# Patient Record
Sex: FEMALE | Race: WHITE | NOT HISPANIC OR LATINO | Employment: OTHER | URBAN - NONMETROPOLITAN AREA
[De-identification: names, ages, dates, MRNs, and addresses within clinical notes are randomized per-mention and may not be internally consistent; named-entity substitution may affect disease eponyms.]

---

## 2018-10-24 ENCOUNTER — APPOINTMENT (OUTPATIENT)
Dept: CT IMAGING | Facility: HOSPITAL | Age: 73
End: 2018-10-24

## 2018-10-24 ENCOUNTER — HOSPITAL ENCOUNTER (OUTPATIENT)
Facility: HOSPITAL | Age: 73
Setting detail: OBSERVATION
Discharge: HOME | End: 2018-10-25
Attending: EMERGENCY MEDICINE | Admitting: FAMILY MEDICINE

## 2018-10-24 ENCOUNTER — APPOINTMENT (OUTPATIENT)
Dept: GENERAL RADIOLOGY | Facility: HOSPITAL | Age: 73
End: 2018-10-24

## 2018-10-24 DIAGNOSIS — R07.9 CHEST PAIN, UNSPECIFIED TYPE: ICD-10-CM

## 2018-10-24 DIAGNOSIS — R06.09 DYSPNEA ON EXERTION: Primary | ICD-10-CM

## 2018-10-24 LAB
ALBUMIN SERPL-MCNC: 4 G/DL (ref 3.5–5)
ALBUMIN/GLOB SERPL: 1.2 G/DL (ref 1.1–2.5)
ALP SERPL-CCNC: 64 U/L (ref 24–120)
ALT SERPL W P-5'-P-CCNC: 32 U/L (ref 0–54)
ANION GAP SERPL CALCULATED.3IONS-SCNC: 12 MMOL/L (ref 4–13)
AST SERPL-CCNC: 30 U/L (ref 7–45)
BASOPHILS # BLD AUTO: 0.06 10*3/MM3 (ref 0–0.2)
BASOPHILS NFR BLD AUTO: 0.7 % (ref 0–2)
BILIRUB SERPL-MCNC: 0.9 MG/DL (ref 0.1–1)
BUN BLD-MCNC: 16 MG/DL (ref 5–21)
BUN/CREAT SERPL: 17.2 (ref 7–25)
CALCIUM SPEC-SCNC: 9.5 MG/DL (ref 8.4–10.4)
CHLORIDE SERPL-SCNC: 103 MMOL/L (ref 98–110)
CO2 SERPL-SCNC: 26 MMOL/L (ref 24–31)
CREAT BLD-MCNC: 0.93 MG/DL (ref 0.5–1.4)
D DIMER PPP FEU-MCNC: 1.78 MG/L (FEU) (ref 0–0.5)
DEPRECATED RDW RBC AUTO: 41.2 FL (ref 40–54)
EOSINOPHIL # BLD AUTO: 0.12 10*3/MM3 (ref 0–0.7)
EOSINOPHIL NFR BLD AUTO: 1.4 % (ref 0–4)
ERYTHROCYTE [DISTWIDTH] IN BLOOD BY AUTOMATED COUNT: 12.7 % (ref 12–15)
GFR SERPL CREATININE-BSD FRML MDRD: 59 ML/MIN/1.73
GLOBULIN UR ELPH-MCNC: 3.3 GM/DL
GLUCOSE BLD-MCNC: 120 MG/DL (ref 70–100)
HCT VFR BLD AUTO: 37.3 % (ref 37–47)
HGB BLD-MCNC: 13.1 G/DL (ref 12–16)
HOLD SPECIMEN: NORMAL
HOLD SPECIMEN: NORMAL
IMM GRANULOCYTES # BLD: 0.04 10*3/MM3 (ref 0–0.03)
IMM GRANULOCYTES NFR BLD: 0.5 % (ref 0–5)
LYMPHOCYTES # BLD AUTO: 2.69 10*3/MM3 (ref 0.72–4.86)
LYMPHOCYTES NFR BLD AUTO: 31.3 % (ref 15–45)
MCH RBC QN AUTO: 31 PG (ref 28–32)
MCHC RBC AUTO-ENTMCNC: 35.1 G/DL (ref 33–36)
MCV RBC AUTO: 88.2 FL (ref 82–98)
MONOCYTES # BLD AUTO: 0.78 10*3/MM3 (ref 0.19–1.3)
MONOCYTES NFR BLD AUTO: 9.1 % (ref 4–12)
NEUTROPHILS # BLD AUTO: 4.91 10*3/MM3 (ref 1.87–8.4)
NEUTROPHILS NFR BLD AUTO: 57 % (ref 39–78)
NRBC BLD MANUAL-RTO: 0 /100 WBC (ref 0–0)
NT-PROBNP SERPL-MCNC: 84.4 PG/ML (ref 0–900)
PLATELET # BLD AUTO: 251 10*3/MM3 (ref 130–400)
PMV BLD AUTO: 10.3 FL (ref 6–12)
POTASSIUM BLD-SCNC: 3.5 MMOL/L (ref 3.5–5.3)
PROT SERPL-MCNC: 7.3 G/DL (ref 6.3–8.7)
RBC # BLD AUTO: 4.23 10*6/MM3 (ref 4.2–5.4)
SODIUM BLD-SCNC: 141 MMOL/L (ref 135–145)
TROPONIN I SERPL-MCNC: <0.012 NG/ML (ref 0–0.03)
WBC NRBC COR # BLD: 8.6 10*3/MM3 (ref 4.8–10.8)
WHOLE BLOOD HOLD SPECIMEN: NORMAL
WHOLE BLOOD HOLD SPECIMEN: NORMAL

## 2018-10-24 PROCEDURE — 0 IOPAMIDOL PER 1 ML: Performed by: EMERGENCY MEDICINE

## 2018-10-24 PROCEDURE — 93010 ELECTROCARDIOGRAM REPORT: CPT | Performed by: INTERNAL MEDICINE

## 2018-10-24 PROCEDURE — 71275 CT ANGIOGRAPHY CHEST: CPT

## 2018-10-24 PROCEDURE — 99284 EMERGENCY DEPT VISIT MOD MDM: CPT

## 2018-10-24 PROCEDURE — 85025 COMPLETE CBC W/AUTO DIFF WBC: CPT

## 2018-10-24 PROCEDURE — G0378 HOSPITAL OBSERVATION PER HR: HCPCS

## 2018-10-24 PROCEDURE — 93005 ELECTROCARDIOGRAM TRACING: CPT | Performed by: EMERGENCY MEDICINE

## 2018-10-24 PROCEDURE — 71045 X-RAY EXAM CHEST 1 VIEW: CPT

## 2018-10-24 PROCEDURE — 85379 FIBRIN DEGRADATION QUANT: CPT | Performed by: EMERGENCY MEDICINE

## 2018-10-24 PROCEDURE — 25010000002 ENOXAPARIN PER 10 MG: Performed by: NURSE PRACTITIONER

## 2018-10-24 PROCEDURE — 83880 ASSAY OF NATRIURETIC PEPTIDE: CPT | Performed by: EMERGENCY MEDICINE

## 2018-10-24 PROCEDURE — 84484 ASSAY OF TROPONIN QUANT: CPT | Performed by: EMERGENCY MEDICINE

## 2018-10-24 PROCEDURE — 96372 THER/PROPH/DIAG INJ SC/IM: CPT

## 2018-10-24 PROCEDURE — 84484 ASSAY OF TROPONIN QUANT: CPT | Performed by: NURSE PRACTITIONER

## 2018-10-24 PROCEDURE — 80053 COMPREHEN METABOLIC PANEL: CPT | Performed by: EMERGENCY MEDICINE

## 2018-10-24 PROCEDURE — 93005 ELECTROCARDIOGRAM TRACING: CPT

## 2018-10-24 RX ORDER — ONDANSETRON 2 MG/ML
4 INJECTION INTRAMUSCULAR; INTRAVENOUS EVERY 6 HOURS PRN
Status: DISCONTINUED | OUTPATIENT
Start: 2018-10-24 | End: 2018-10-25 | Stop reason: HOSPADM

## 2018-10-24 RX ORDER — ONDANSETRON 4 MG/1
4 TABLET, FILM COATED ORAL EVERY 6 HOURS PRN
Status: DISCONTINUED | OUTPATIENT
Start: 2018-10-24 | End: 2018-10-25 | Stop reason: HOSPADM

## 2018-10-24 RX ORDER — ASPIRIN 81 MG/1
81 TABLET ORAL DAILY
Status: DISCONTINUED | OUTPATIENT
Start: 2018-10-25 | End: 2018-10-25 | Stop reason: HOSPADM

## 2018-10-24 RX ORDER — ACETAMINOPHEN 325 MG/1
650 TABLET ORAL EVERY 4 HOURS PRN
Status: DISCONTINUED | OUTPATIENT
Start: 2018-10-24 | End: 2018-10-25 | Stop reason: HOSPADM

## 2018-10-24 RX ORDER — LOSARTAN POTASSIUM AND HYDROCHLOROTHIAZIDE 12.5; 5 MG/1; MG/1
1 TABLET ORAL DAILY
COMMUNITY
End: 2022-09-20

## 2018-10-24 RX ORDER — ASPIRIN 81 MG/1
324 TABLET, CHEWABLE ORAL ONCE
Status: DISCONTINUED | OUTPATIENT
Start: 2018-10-24 | End: 2018-10-24 | Stop reason: SDUPTHER

## 2018-10-24 RX ORDER — ONDANSETRON 4 MG/1
4 TABLET, ORALLY DISINTEGRATING ORAL EVERY 6 HOURS PRN
Status: DISCONTINUED | OUTPATIENT
Start: 2018-10-24 | End: 2018-10-25 | Stop reason: HOSPADM

## 2018-10-24 RX ORDER — SODIUM CHLORIDE 0.9 % (FLUSH) 0.9 %
3-10 SYRINGE (ML) INJECTION AS NEEDED
Status: DISCONTINUED | OUTPATIENT
Start: 2018-10-24 | End: 2018-10-25 | Stop reason: HOSPADM

## 2018-10-24 RX ORDER — SODIUM CHLORIDE 0.9 % (FLUSH) 0.9 %
10 SYRINGE (ML) INJECTION AS NEEDED
Status: DISCONTINUED | OUTPATIENT
Start: 2018-10-24 | End: 2018-10-25 | Stop reason: HOSPADM

## 2018-10-24 RX ORDER — SODIUM CHLORIDE 0.9 % (FLUSH) 0.9 %
3 SYRINGE (ML) INJECTION EVERY 12 HOURS SCHEDULED
Status: DISCONTINUED | OUTPATIENT
Start: 2018-10-24 | End: 2018-10-25 | Stop reason: HOSPADM

## 2018-10-24 RX ORDER — ASPIRIN 81 MG/1
324 TABLET, CHEWABLE ORAL ONCE
Status: COMPLETED | OUTPATIENT
Start: 2018-10-24 | End: 2018-10-24

## 2018-10-24 RX ORDER — LISINOPRIL 5 MG/1
5 TABLET ORAL DAILY
COMMUNITY
End: 2018-10-24

## 2018-10-24 RX ADMIN — Medication: at 22:58

## 2018-10-24 RX ADMIN — ENOXAPARIN SODIUM 40 MG: 40 INJECTION SUBCUTANEOUS at 22:57

## 2018-10-24 RX ADMIN — ASPIRIN 81 MG CHEWABLE TABLET 324 MG: 81 TABLET CHEWABLE at 18:24

## 2018-10-24 RX ADMIN — IOPAMIDOL 100 ML: 755 INJECTION, SOLUTION INTRAVENOUS at 19:52

## 2018-10-24 NOTE — ED PROVIDER NOTES
Subjective   Patient is a 73-year-old female who presents with shortness of breath.  Ongoing 3 weeks according to her.  She also notes worsening dyspnea with exertion and chest tightness.  Substernally.  No radiation.  Worse with any type of activity.  She does have a difficult time walking across the room.  No history of CHF.  Denies leg swelling.  She does note 7 pound weight gain recently.  She also endorses a nonproductive cough.  No fevers.  No history of PE or DVT.  No anticoagulation.  No heart history but does have history of hypertension.            Review of Systems   Constitutional: Negative for chills, diaphoresis, fatigue and fever.   HENT: Negative for sore throat.    Eyes: Negative for visual disturbance.   Respiratory: Positive for chest tightness and shortness of breath.    Cardiovascular: Positive for chest pain. Negative for palpitations and leg swelling.   Gastrointestinal: Negative for abdominal pain.   Genitourinary: Negative for hematuria.   Musculoskeletal: Negative for back pain.   Skin: Negative for rash.   Neurological: Negative for syncope and headaches.       Past Medical History:   Diagnosis Date   • Hypertension        Allergies   Allergen Reactions   • Penicillins Hives       Past Surgical History:   Procedure Laterality Date   • CHOLECYSTECTOMY     • HYSTERECTOMY         History reviewed. No pertinent family history.    Social History     Social History   • Marital status:      Social History Main Topics   • Smoking status: Never Smoker   • Alcohol use No   • Drug use: No     Other Topics Concern   • Not on file       Lab Results (last 24 hours)     Procedure Component Value Units Date/Time    CBC & Differential [[id number]] Collected:  10/24/18 1817    Specimen:  Blood Updated:  10/24/18 1828    Narrative:       The following orders were created for panel order CBC & Differential.  Procedure                               Abnormality         Status                     ---------                                -----------         ------                     CBC Auto Differential[[id number]]        Abnormal            Final result                 Please view results for these tests on the individual orders.    Comprehensive Metabolic Panel [[id number]]  (Abnormal) Collected:  10/24/18 1817    Specimen:  Blood Updated:  10/24/18 1839     Glucose 120 (H) mg/dL      BUN 16 mg/dL      Creatinine 0.93 mg/dL      Sodium 141 mmol/L      Potassium 3.5 mmol/L      Chloride 103 mmol/L      CO2 26.0 mmol/L      Calcium 9.5 mg/dL      Total Protein 7.3 g/dL      Albumin 4.00 g/dL      ALT (SGPT) 32 U/L      AST (SGOT) 30 U/L      Alkaline Phosphatase 64 U/L      Total Bilirubin 0.9 mg/dL      eGFR Non African Amer 59 (L) mL/min/1.73      Globulin 3.3 gm/dL      A/G Ratio 1.2 g/dL      BUN/Creatinine Ratio 17.2     Anion Gap 12.0 mmol/L     Narrative:       The MDRD GFR formula is only valid for adults with stable renal function between ages 18 and 70.    Troponin [[id number]]  (Normal) Collected:  10/24/18 1817    Specimen:  Blood Updated:  10/24/18 1850     Troponin I <0.012 ng/mL     CBC Auto Differential [[id number]]  (Abnormal) Collected:  10/24/18 1817    Specimen:  Blood Updated:  10/24/18 1828     WBC 8.60 10*3/mm3      RBC 4.23 10*6/mm3      Hemoglobin 13.1 g/dL      Hematocrit 37.3 %      MCV 88.2 fL      MCH 31.0 pg      MCHC 35.1 g/dL      RDW 12.7 %      RDW-SD 41.2 fl      MPV 10.3 fL      Platelets 251 10*3/mm3      Neutrophil % 57.0 %      Lymphocyte % 31.3 %      Monocyte % 9.1 %      Eosinophil % 1.4 %      Basophil % 0.7 %      Immature Grans % 0.5 %      Neutrophils, Absolute 4.91 10*3/mm3      Lymphocytes, Absolute 2.69 10*3/mm3      Monocytes, Absolute 0.78 10*3/mm3      Eosinophils, Absolute 0.12 10*3/mm3      Basophils, Absolute 0.06 10*3/mm3      Immature Grans, Absolute 0.04 (H) 10*3/mm3      nRBC 0.0 /100 WBC     D-dimer, Quantitative [[id number]]  (Abnormal) Collected:  10/24/18 4308     Specimen:  Blood Updated:  10/24/18 1921     D-Dimer, Quantitative 1.78 (H) mg/L (FEU)     Narrative:       Reference Range is 0-0.50 mg/L FEU. However, results <0.50 mg/L FEU tends to rule out DVT or PE. Results >0.50 mg/L FEU are not useful in predicting absence or presence of DVT or PE.    BNP [[id number]]  (Normal) Collected:  10/24/18 1817    Specimen:  Blood Updated:  10/24/18 1913     proBNP 84.4 pg/mL     Troponin [[id number]]  (Normal) Collected:  10/24/18 2045    Specimen:  Blood Updated:  10/24/18 2114     Troponin I <0.012 ng/mL           Objective   Physical Exam   Constitutional: She is oriented to person, place, and time. Vital signs are normal. She appears well-nourished.  Non-toxic appearance. She does not have a sickly appearance. She does not appear ill. No distress.   HENT:   Head: Atraumatic.   Mouth/Throat: Oropharynx is clear and moist and mucous membranes are normal.   Eyes: Pupils are equal, round, and reactive to light. EOM are normal.   Neck: Normal range of motion. Neck supple. No JVD present.   Cardiovascular: Normal rate, regular rhythm and normal heart sounds.  Exam reveals no gallop and no friction rub.    No murmur heard.  Pulmonary/Chest: Effort normal and breath sounds normal. No stridor. No tachypnea. No respiratory distress. She has no decreased breath sounds. She has no wheezes. She has no rhonchi. She has no rales.   Abdominal: Soft. There is no tenderness.   Musculoskeletal: She exhibits no edema or tenderness.   Neurological: She is alert and oriented to person, place, and time.   Skin: Skin is warm and dry. She is not diaphoretic.   Psychiatric: She has a normal mood and affect.   Nursing note and vitals reviewed.      ECG 12 Lead    Date/Time: 10/24/2018 7:01 PM  Performed by: FRANCISCO GERARD  Authorized by: FRANCISCO GERARD   Interpreted by physician  Comparison: not compared with previous ECG   Rhythm: sinus rhythm  Rate: normal  QRS axis: left  T depression:  "III  Other: no other findings  Clinical impression: non-specific ECG  Comments: Nonspecific ST waves               CT Angiogram Chest With Contrast   Final Result   1. No evidence of pulmonary embolism or acute findings. Mild bibasilar   atelectasis and probable atelectasis within the dependent aspect of the   left upper lobe along the fissure. No consolidating pneumonia.                   This report was finalized on 10/24/2018 20:10 by Dr. Facundo Turner MD.      XR Chest 1 View   Final Result   Shallow inspiration, no acute cardiopulmonary abnormality.       This report was finalized on 10/24/2018 19:04 by Dr. Facundo Turner MD.          /78 (BP Location: Left arm, Patient Position: Lying)   Pulse 72   Temp 97.5 °F (36.4 °C) (Temporal Artery )   Resp 20   Ht 157.5 cm (62\")   Wt 86.2 kg (190 lb)   SpO2 97%   BMI 34.75 kg/m²     ED Course    ED Course as of Oct 24 2130   Wed Oct 24, 2018   1934 D-dimer, Quant: (!) 1.78 [TH]   1934 This is a 73-year-old female who presents in setting of shortness of breath with exertion.  7 pound weight gain over the past several weeks.  Vital signs are stable.  No hypoxia.  She is well-appearing.  No edema, JVD, crackles.  Her chest x-ray is clear.  BNP is normal.  Her d-dimer is elevated therefore we will obtain a CT angiogram.  Her troponin is negative.  ECG is normal sinus.  [TH]   2026 CT angiogram does show atelectasis.  No pneumonia or edema.  [TH]   2107 Her repeat troponin is negative.    [TH]   2129 Given chest pain and dyspnea on exertion, we will admit observation for serial enzymes and possible echocardiogram tomorrow.  [TH]      ED Course User Index  [TH] Ced Mathew MD       Medications   sodium chloride 0.9 % flush 10 mL (not administered)   aspirin chewable tablet 324 mg (324 mg Oral Given 10/24/18 1824)   iopamidol (ISOVUE-370) 76 % injection 100 mL (100 mL Intravenous Given 10/24/18 1952)            MDM  Number of Diagnoses or Management " Options  Chest pain, unspecified type: new and requires workup  Dyspnea on exertion: new and requires workup     Amount and/or Complexity of Data Reviewed  Clinical lab tests: reviewed and ordered  Tests in the radiology section of CPT®: reviewed and ordered  Tests in the medicine section of CPT®: reviewed  Discuss the patient with other providers: yes    Risk of Complications, Morbidity, and/or Mortality  Presenting problems: moderate  Diagnostic procedures: moderate  Management options: moderate    Patient Progress  Patient progress: stable      Final diagnoses:   Dyspnea on exertion   Chest pain, unspecified type          Ced Mathew MD  10/24/18 6919

## 2018-10-25 ENCOUNTER — APPOINTMENT (OUTPATIENT)
Dept: CARDIOLOGY | Facility: HOSPITAL | Age: 73
End: 2018-10-25

## 2018-10-25 VITALS
HEART RATE: 73 BPM | OXYGEN SATURATION: 95 % | SYSTOLIC BLOOD PRESSURE: 115 MMHG | HEIGHT: 62 IN | DIASTOLIC BLOOD PRESSURE: 55 MMHG | WEIGHT: 198.19 LBS | TEMPERATURE: 96.9 F | RESPIRATION RATE: 16 BRPM | BODY MASS INDEX: 36.47 KG/M2

## 2018-10-25 LAB
ANION GAP SERPL CALCULATED.3IONS-SCNC: 9 MMOL/L (ref 4–13)
ARTICHOKE IGE QN: 104 MG/DL (ref 0–99)
BH CV STRESS BP STAGE 1: NORMAL
BH CV STRESS BP STAGE 2: NORMAL
BH CV STRESS BP STAGE 3: NORMAL
BH CV STRESS DOSE DOBUTAMINE STAGE 1: 10
BH CV STRESS DOSE DOBUTAMINE STAGE 2: 20
BH CV STRESS DOSE DOBUTAMINE STAGE 3: 30
BH CV STRESS DURATION MIN STAGE 1: 3
BH CV STRESS DURATION MIN STAGE 2: 3
BH CV STRESS DURATION MIN STAGE 3: 3
BH CV STRESS DURATION SEC STAGE 1: 0
BH CV STRESS DURATION SEC STAGE 2: 0
BH CV STRESS DURATION SEC STAGE 3: 7
BH CV STRESS HR STAGE 1: 96
BH CV STRESS HR STAGE 2: 113
BH CV STRESS HR STAGE 3: 127
BH CV STRESS PROTOCOL 1: NORMAL
BH CV STRESS RECOVERY BP: NORMAL MMHG
BH CV STRESS RECOVERY HR: 86 BPM
BH CV STRESS STAGE 1: 1
BH CV STRESS STAGE 2: 2
BH CV STRESS STAGE 3: 3
BUN BLD-MCNC: 18 MG/DL (ref 5–21)
BUN/CREAT SERPL: 17.5 (ref 7–25)
CALCIUM SPEC-SCNC: 9 MG/DL (ref 8.4–10.4)
CHLORIDE SERPL-SCNC: 105 MMOL/L (ref 98–110)
CHOLEST SERPL-MCNC: 166 MG/DL (ref 130–200)
CO2 SERPL-SCNC: 28 MMOL/L (ref 24–31)
CREAT BLD-MCNC: 1.03 MG/DL (ref 0.5–1.4)
DEPRECATED RDW RBC AUTO: 41.5 FL (ref 40–54)
ERYTHROCYTE [DISTWIDTH] IN BLOOD BY AUTOMATED COUNT: 12.6 % (ref 12–15)
GFR SERPL CREATININE-BSD FRML MDRD: 53 ML/MIN/1.73
GLUCOSE BLD-MCNC: 100 MG/DL (ref 70–100)
HBA1C MFR BLD: 5.8 %
HCT VFR BLD AUTO: 37 % (ref 37–47)
HDLC SERPL-MCNC: 39 MG/DL
HGB BLD-MCNC: 12.6 G/DL (ref 12–16)
LDLC/HDLC SERPL: 2.86 {RATIO}
MAXIMAL PREDICTED HEART RATE: 147 BPM
MCH RBC QN AUTO: 30.4 PG (ref 28–32)
MCHC RBC AUTO-ENTMCNC: 34.1 G/DL (ref 33–36)
MCV RBC AUTO: 89.4 FL (ref 82–98)
PERCENT MAX PREDICTED HR: 86.39 %
PLATELET # BLD AUTO: 241 10*3/MM3 (ref 130–400)
PMV BLD AUTO: 10.6 FL (ref 6–12)
POTASSIUM BLD-SCNC: 3.7 MMOL/L (ref 3.5–5.3)
RBC # BLD AUTO: 4.14 10*6/MM3 (ref 4.2–5.4)
SODIUM BLD-SCNC: 142 MMOL/L (ref 135–145)
STRESS BASELINE BP: NORMAL MMHG
STRESS BASELINE HR: 67 BPM
STRESS PERCENT HR: 102 %
STRESS POST EXERCISE DUR MIN: 9 MIN
STRESS POST EXERCISE DUR SEC: 7 SEC
STRESS POST PEAK BP: NORMAL MMHG
STRESS POST PEAK HR: 127 BPM
STRESS TARGET HR: 125 BPM
TRIGL SERPL-MCNC: 78 MG/DL (ref 0–149)
TSH SERPL DL<=0.05 MIU/L-ACNC: 5 MIU/ML (ref 0.47–4.68)
WBC NRBC COR # BLD: 6.75 10*3/MM3 (ref 4.8–10.8)

## 2018-10-25 PROCEDURE — 84443 ASSAY THYROID STIM HORMONE: CPT | Performed by: NURSE PRACTITIONER

## 2018-10-25 PROCEDURE — 25010000003 DOBUTAMINE PER 250 MG: Performed by: INTERNAL MEDICINE

## 2018-10-25 PROCEDURE — 85027 COMPLETE CBC AUTOMATED: CPT | Performed by: NURSE PRACTITIONER

## 2018-10-25 PROCEDURE — 94799 UNLISTED PULMONARY SVC/PX: CPT

## 2018-10-25 PROCEDURE — 93018 CV STRESS TEST I&R ONLY: CPT | Performed by: INTERNAL MEDICINE

## 2018-10-25 PROCEDURE — 80048 BASIC METABOLIC PNL TOTAL CA: CPT | Performed by: NURSE PRACTITIONER

## 2018-10-25 PROCEDURE — G0378 HOSPITAL OBSERVATION PER HR: HCPCS

## 2018-10-25 PROCEDURE — 93352 ADMIN ECG CONTRAST AGENT: CPT | Performed by: INTERNAL MEDICINE

## 2018-10-25 PROCEDURE — 80061 LIPID PANEL: CPT | Performed by: NURSE PRACTITIONER

## 2018-10-25 PROCEDURE — 83036 HEMOGLOBIN GLYCOSYLATED A1C: CPT | Performed by: NURSE PRACTITIONER

## 2018-10-25 PROCEDURE — 93350 STRESS TTE ONLY: CPT

## 2018-10-25 PROCEDURE — 25010000002 PERFLUTREN 6.52 MG/ML SUSPENSION: Performed by: INTERNAL MEDICINE

## 2018-10-25 PROCEDURE — 94760 N-INVAS EAR/PLS OXIMETRY 1: CPT

## 2018-10-25 PROCEDURE — 93350 STRESS TTE ONLY: CPT | Performed by: INTERNAL MEDICINE

## 2018-10-25 PROCEDURE — 93017 CV STRESS TEST TRACING ONLY: CPT

## 2018-10-25 RX ORDER — DOBUTAMINE HYDROCHLORIDE 100 MG/100ML
10-50 INJECTION INTRAVENOUS CONTINUOUS
Status: DISCONTINUED | OUTPATIENT
Start: 2018-10-25 | End: 2018-10-25 | Stop reason: HOSPADM

## 2018-10-25 RX ADMIN — Medication 3 ML: at 08:50

## 2018-10-25 RX ADMIN — PERFLUTREN 8.48 MG: 6.52 INJECTION, SUSPENSION INTRAVENOUS at 07:42

## 2018-10-25 RX ADMIN — Medication 10 MCG/KG/MIN: at 07:42

## 2018-10-25 RX ADMIN — ASPIRIN 81 MG: 81 TABLET ORAL at 08:49

## 2018-10-25 RX ADMIN — LOSARTAN POTASSIUM: 50 TABLET ORAL at 08:49

## 2018-10-25 NOTE — PLAN OF CARE
Problem: Patient Care Overview  Goal: Plan of Care Review  Outcome: Ongoing (interventions implemented as appropriate)   10/25/18 0255   Coping/Psychosocial   Plan of Care Reviewed With patient   Plan of Care Review   Progress no change   OTHER   Outcome Summary pt admit from ER. no c/o any kind. denies chest pain. npo since 0000 for echo in the AM. cont to monitor     Goal: Individualization and Mutuality  Outcome: Ongoing (interventions implemented as appropriate)      Problem: Cardiac: ACS (Acute Coronary Syndrome) (Adult)  Goal: Signs and Symptoms of Listed Potential Problems Will be Absent, Minimized or Managed (Cardiac: ACS)  Outcome: Ongoing (interventions implemented as appropriate)

## 2018-10-25 NOTE — DISCHARGE SUMMARY
"    [facility] Medicine Services  DISCHARGE SUMMARY       Date of Admission: 10/24/2018  Date of Discharge:  10/25/2018  Primary Care Physician: Bry Ennis Jr, MD    Presenting Problem/History of Present Illness:  Dyspnea on exertion [R06.09]     Final Discharge Diagnoses:  Dyspnea on exertion  Chest tightness      Consults: none    Procedures Performed: none    Pertinent Test Results:    · Baseline ECG of normal sinus rhythm noted. Non-specific ST-T wave changes noted.  · Chest pain reported with Dobutamine infusion.  · Normal post-stress wall motion.  · Would consider this to be a low risk stress echo with no significant echocardiographic evidence for myocardial ischemia.  ·   Chief Complaint on Day of Discharge:  No pain  No shortness of breath    History of Present Illness on Day of Discharge:   Resolved chest pain/shortness of breath.     Hospital Course:  The patient is a 73 y.o. female who presented to [facility] with shortness of breath and chest tightness  Admitted  Serial enzymes negative.   Stress test obtained.  Low risk for ischemia.       Condition on Discharge:   stable improved    Physical Exam on Discharge:  /55   Pulse 73   Temp 96.9 °F (36.1 °C)   Resp 16   Ht 157.5 cm (62.01\")   Wt 89.9 kg (198 lb 3.1 oz)   SpO2 95%   BMI 36.24 kg/m²   Physical Exam   Constitutional: She is oriented to person, place, and time. She appears well-developed and well-nourished.   Eyes: Pupils are equal, round, and reactive to light. Conjunctivae and EOM are normal.   Neck: Neck supple.   Cardiovascular: Normal rate and regular rhythm.  Exam reveals no gallop and no friction rub.    No murmur heard.  Pulmonary/Chest: Effort normal and breath sounds normal.   Abdominal: Soft. Bowel sounds are normal. There is no hepatosplenomegaly. There is no tenderness.   Musculoskeletal: Normal range of motion. She exhibits no edema.   Neurological: She is alert and " oriented to person, place, and time. No cranial nerve deficit.   Skin: Skin is warm and dry.   Psychiatric: She has a normal mood and affect. Her behavior is normal.   Nursing note and vitals reviewed.        Discharge Disposition:  Home or Self Care    Discharge Medications:     Discharge Medications      Continue These Medications      Instructions Start Date   losartan-hydrochlorothiazide 50-12.5 MG per tablet  Commonly known as:  HYZAAR   1 tablet, Oral, Daily             Discharge Diet:   Diet Instructions     Diet: Regular       Discharge Diet:  Regular          Activity at Discharge:   Activity Instructions     Activity as Tolerated             Discharge Care Plan/Instructions:   Return to ER if problems.     Follow-up Appointments:   5-7 days with PCP    Test Results Pending at Discharge: none    Mey Fletcher DO  10/25/18  2:57 PM    Time:   30 minutes

## 2018-10-25 NOTE — H&P
[facility] Medicine Services  HISTORY AND PHYSICAL    Date of Admission: 10/24/2018  Primary Care Physician: Bry Ennis Jr, MD    Subjective     Chief Complaint: shortness of breathing and chest tightness    History of Present Illness  Mey Gomez is a very pleasant 73-year-old  female with a past medical history of hypertension and spinal stenosis and the lumbar region.  Patient has been receiving injections from her [place] neuro-surgeon and states her back pain is greatly improved and she does not take oral pain medication.  She reports an ongoing worsening dyspnea with exertion with associated chest tightness.  Tightness is located substernally without radiation.  She reports pain is worse with any type of activity.  States she has difficulty walking across a room without becoming shortness of breath.  She has noted a 7 pound weight gain over the past 4 weeks and has attributed to the steroid injections.  She has no history of CHF, no orthopnea, no edema.  She denies abdominal pain or changes in bowel or bladder habits.  Currently at rest she has no shortness of breathing or chest tightness. jere is admitted for further evaluation and treatment.    Review of Systems   A 10 point review of systems was completed, all negative except for those discussed in HPI.    Past Medical History:   Past Medical History:   Diagnosis Date   • Hypertension    • Spinal stenosis of lumbar region        Past Surgical History:   Past Surgical History:   Procedure Laterality Date   • CHOLECYSTECTOMY     • HYSTERECTOMY         Family History: family history includes Cancer in her mother; Heart disease in her mother; No Known Problems in her father.    Social History:  reports that she has never smoked. She does not have any smokeless tobacco history on file. She reports that she does not drink alcohol or use drugs.    Code Status: Full, if unable speak for herself her   "Eduardo will speak for her      Allergies:  Allergies   Allergen Reactions   • Penicillins Hives       Medications:  Losartan-hydrochlorothiazide 50/12.5 mg daily    Objective     /65   Pulse 67   Temp 97.9 °F (36.6 °C) (Temporal Artery )   Resp 18   Ht 157.5 cm (62\")   Wt 86.2 kg (190 lb)   SpO2 97%   BMI 34.75 kg/m²      Physical Exam   Constitutional: She is oriented to person, place, and time. She appears well-developed and well-nourished. No distress.   HENT:   Head: Normocephalic and atraumatic.   Eyes: Pupils are equal, round, and reactive to light. Conjunctivae and EOM are normal. No scleral icterus.   Neck: Normal range of motion. Neck supple. No JVD present. No tracheal deviation present.   Cardiovascular: Normal rate, regular rhythm, normal heart sounds and intact distal pulses.  Exam reveals no gallop.    No murmur heard.  Pulmonary/Chest: Effort normal and breath sounds normal. No respiratory distress. She has no wheezes. She has no rales.   Abdominal: Soft. Bowel sounds are normal. She exhibits no distension. There is no tenderness. There is no guarding.   Musculoskeletal: Normal range of motion. She exhibits no edema.   Neurological: She is alert and oriented to person, place, and time.   Skin: Skin is warm and dry. No rash noted. She is not diaphoretic. No erythema. No pallor.   Psychiatric: She has a normal mood and affect. Her behavior is normal.   Vitals reviewed.      Pertinent Data:   Lab Results (last 72 hours)     Procedure Component Value Units Date/Time    Troponin [[id number]]  (Normal) Collected:  10/24/18 2045    Specimen:  Blood Updated:  10/24/18 2114     Troponin I <0.012 ng/mL     D-dimer, Quantitative [[id number]]  (Abnormal) Collected:  10/24/18 1817    Specimen:  Blood Updated:  10/24/18 1921     D-Dimer, Quantitative 1.78 (H) mg/L (FEU)     Narrative:       Reference Range is 0-0.50 mg/L FEU. However, results <0.50 mg/L FEU tends to rule out DVT or PE. Results >0.50 " mg/L FEU are not useful in predicting absence or presence of DVT or PE.    BNP [[id number]]  (Normal) Collected:  10/24/18 1817    Specimen:  Blood Updated:  10/24/18 1913     proBNP 84.4 pg/mL     Troponin [[id number]]  (Normal) Collected:  10/24/18 1817    Specimen:  Blood Updated:  10/24/18 1850     Troponin I <0.012 ng/mL     Comprehensive Metabolic Panel [[id number]]  (Abnormal) Collected:  10/24/18 1817    Specimen:  Blood Updated:  10/24/18 1839     Glucose 120 (H) mg/dL      BUN 16 mg/dL      Creatinine 0.93 mg/dL      Sodium 141 mmol/L      Potassium 3.5 mmol/L      Chloride 103 mmol/L      CO2 26.0 mmol/L      Calcium 9.5 mg/dL      Total Protein 7.3 g/dL      Albumin 4.00 g/dL      ALT (SGPT) 32 U/L      AST (SGOT) 30 U/L      Alkaline Phosphatase 64 U/L      Total Bilirubin 0.9 mg/dL      eGFR Non African Amer 59 (L) mL/min/1.73      Globulin 3.3 gm/dL      A/G Ratio 1.2 g/dL      BUN/Creatinine Ratio 17.2     Anion Gap 12.0 mmol/L     CBC Auto Differential [[id number]]  (Abnormal) Collected:  10/24/18 1817    Specimen:  Blood Updated:  10/24/18 1828     WBC 8.60 10*3/mm3      RBC 4.23 10*6/mm3      Hemoglobin 13.1 g/dL      Hematocrit 37.3 %      MCV 88.2 fL      MCH 31.0 pg      MCHC 35.1 g/dL      RDW 12.7 %      RDW-SD 41.2 fl      MPV 10.3 fL      Platelets 251 10*3/mm3      Neutrophil % 57.0 %      Lymphocyte % 31.3 %      Monocyte % 9.1 %      Eosinophil % 1.4 %      Basophil % 0.7 %      Immature Grans % 0.5 %      Neutrophils, Absolute 4.91 10*3/mm3      Lymphocytes, Absolute 2.69 10*3/mm3      Monocytes, Absolute 0.78 10*3/mm3      Eosinophils, Absolute 0.12 10*3/mm3      Basophils, Absolute 0.06 10*3/mm3      Immature Grans, Absolute 0.04 (H) 10*3/mm3      nRBC 0.0 /100 WBC         Imaging Results (last 24 hours)     Procedure Component Value Units Date/Time    CT Angiogram Chest With Contrast [[id number]] Collected:  10/24/18 2005     Updated:  10/24/18 2013    Narrative:       EXAMINATION: CT  ANGIOGRAM CHEST W CONTRAST- 10/24/2018 8:05 PM CDT     HISTORY: Chest pain, acute, PE suspected, intermed prob, positive  D-dimer     DOSE: 494 mGycm (Automatic exposure control technique was implemented in  an effort to keep the radiation dose as low as possible without  compromising image quality)     REPORT: Spiral CT of the chest was performed after administration of  intravenous contrast from the thoracic inlet through the upper abdomen  using CTA protocol. Reconstructed coronal and sagittal images were also  reviewed.     Comparison: None.     The contrast bolus is satisfactory, there is mild respiratory motion  artifact. The pulmonary arteries are normal in caliber, no pulmonary  embolism is identified. The thoracic aorta is normal caliber, and no  dissection is seen. Heart size is borderline enlarged. No intrathoracic  lymphadenopathy is identified. The thyroid gland appears within normal.  There are calcified right hilar and subcarinal lymph nodes compatible  with healed granulomatous disease. Review of lung windows shows mild  bibasilar atelectasis and hazy opacities in the dependent portion of the  left upper lobe along the fissure are noted without consolidation. This  probably also represents atelectasis rather than pneumonia. Clinical  correlation is recommended. The airways are patent. There is no  pneumothorax or pleural effusion. The visualized upper and mid shows  evidence of previous cholecystectomy. There are scattered calcified  granulomas within the spleen. Review of bone windows is unremarkable.       Impression:       1. No evidence of pulmonary embolism or acute findings. Mild bibasilar  atelectasis and probable atelectasis within the dependent aspect of the  left upper lobe along the fissure. No consolidating pneumonia.      This report was finalized on 10/24/2018 20:10 by Dr. Facundo Turner MD.    XR Chest 1 View [[id number]] Collected:  10/24/18 1904     Updated:  10/24/18 1907     Narrative:       EXAMINATION: XR CHEST 1 VW- 10/24/2018 7:04 PM CDT     HISTORY: Chest Pain protocol.     REPORT: There are no comparison studies.        The lungs are hypoaerated, no focal infiltrate or pulmonary  consolidation is identified. No pneumothorax or effusion is identified.  Heart size is normal. The osseous structures show no acute findings.       Impression:       Shallow inspiration, no acute cardiopulmonary abnormality.     This report was finalized on 10/24/2018 19:04 by Dr. Facundo Turner MD.          I have personally reviewed and interpreted the radiology studies and ECG obtained at time of admission.     Assessment / Plan     Assessment:   Dyspnea on exertion  Chest pain  Hypertension   Spinal stenosis lumbar region      Plan:   1.  Admit as observation  2.  Stress echocardiogram in a.m.  3.  Serial troponins  4.  DVT prophylaxis with Lovenox and SCDs  5.  Continue home medications  6.  Labs in a.m. CBC, BMP, A1c, lipid panel, TSH    I discussed the patient's findings and my recommendations with: Jarod Singh MD  Time spent: 35 minutes      JAC Dang  10/24/18   10:08 PM     I personally evaluated and examined the patient in conjunction with JAC Pandya and agree with the assessment, treatment plan, and disposition of the patient as recorded by her. My history, exam, and further recommendations are: I have reviewed and agree with the plans. Dixie Singh MD  10/25/18  12:41 AM

## 2018-10-25 NOTE — PROGRESS NOTES
Discharge Planning Assessment  [facility]     Patient Name: Benedict Gomez  MRN: [id number]  Today's Date: 10/25/2018    Admit Date: 10/24/2018          Discharge Needs Assessment     Row Name 10/25/18 1436       Living Environment    Lives With spouse    Current Living Arrangements home/apartment/condo    Primary Care Provided by spouse/significant other    Provides Primary Care For no one    Family Caregiver if Needed spouse    Quality of Family Relationships helpful;involved;supportive    Able to Return to Prior Arrangements yes       Resource/Environmental Concerns    Resource/Environmental Concerns none    Transportation Concerns car, none       Transition Planning    Patient/Family Anticipates Transition to home with family    Transportation Anticipated family or friend will provide       Discharge Needs Assessment    Readmission Within the Last 30 Days no previous admission in last 30 days    Equipment Currently Used at Home bipap/cpap   CPAP [facility]    Discharge Coordination/Progress LIVES WITH SPOUSE; HAS CPAP WITH [facility]; [facility] HAS RX COVERAGE            Discharge Plan    No documentation.       Destination     No service coordination in this encounter.      Durable Medical Equipment     No service coordination in this encounter.      Dialysis/Infusion     No service coordination in this encounter.      Home Medical Care     No service coordination in this encounter.      Social Care     No service coordination in this encounter.                Demographic Summary    No documentation.           Functional Status    No documentation.           Psychosocial    No documentation.           Abuse/Neglect    No documentation.           Legal    No documentation.           Substance Abuse    No documentation.           Patient Forms    No documentation.         Tiffanie Baca RN

## 2021-01-27 ENCOUNTER — OFFICE VISIT (OUTPATIENT)
Dept: FAMILY MEDICINE CLINIC | Facility: CLINIC | Age: 76
End: 2021-01-27

## 2021-01-27 VITALS
HEART RATE: 67 BPM | HEIGHT: 62 IN | TEMPERATURE: 97.4 F | SYSTOLIC BLOOD PRESSURE: 144 MMHG | DIASTOLIC BLOOD PRESSURE: 84 MMHG | OXYGEN SATURATION: 98 % | BODY MASS INDEX: 35.44 KG/M2 | WEIGHT: 192.6 LBS

## 2021-01-27 DIAGNOSIS — I10 ESSENTIAL HYPERTENSION: Primary | ICD-10-CM

## 2021-01-27 DIAGNOSIS — Z11.59 NEED FOR HEPATITIS C SCREENING TEST: ICD-10-CM

## 2021-01-27 DIAGNOSIS — M65.9 SYNOVITIS: ICD-10-CM

## 2021-01-27 DIAGNOSIS — R53.83 FATIGUE, UNSPECIFIED TYPE: ICD-10-CM

## 2021-01-27 PROCEDURE — 99204 OFFICE O/P NEW MOD 45 MIN: CPT | Performed by: FAMILY MEDICINE

## 2021-01-27 RX ORDER — MELOXICAM 7.5 MG/1
1 TABLET ORAL EVERY OTHER DAY
COMMUNITY
Start: 2021-01-22

## 2021-01-27 RX ORDER — PILOCARPINE HYDROCHLORIDE 5 MG/1
5 TABLET, FILM COATED ORAL DAILY
COMMUNITY
Start: 2020-11-19

## 2021-01-27 RX ORDER — MULTIPLE VITAMINS W/ MINERALS TAB 9MG-400MCG
1 TAB ORAL DAILY
COMMUNITY

## 2021-01-27 NOTE — PROGRESS NOTES
Subjective   Benedict Gomez is a 75 y.o. female.     75-year-old female with history of surgery and syndrome and stiffness swelling shortness of breath and hypertension     Hypertension  This is a chronic problem. The current episode started more than 1 year ago. The problem has been waxing and waning since onset. The problem is resistant. Associated symptoms include shortness of breath. Pertinent negatives include no chest pain. Agents associated with hypertension include NSAIDs. Risk factors for coronary artery disease include dyslipidemia, obesity, post-menopausal state and sedentary lifestyle. Past treatments include angiotensin blockers and diuretics. Current antihypertension treatment includes diuretics and angiotensin blockers. The current treatment provides mild improvement. Compliance problems include diet and exercise.      Vitals:    01/27/21 1417   BP: 144/84   Pulse: 67   Temp: 97.4 °F (36.3 °C)   SpO2: 98%       The following portions of the patient's history were reviewed and updated as appropriate: allergies, current medications, past family history, past medical history, past social history, past surgical history and problem list.    Review of Systems   HENT: Positive for mouth sores.    Respiratory: Positive for apnea and shortness of breath.         Uses CPAP regularly for sleep apnea   Cardiovascular: Negative for chest pain and leg swelling.   Gastrointestinal:        Colonoscopy 4- 5 years ago   Genitourinary:        Mammography Fall of 2020   Musculoskeletal: Positive for arthralgias and joint swelling.        History of spinal stenosis sees neurosurgeons in [place]       Objective   Physical Exam  Vitals signs and nursing note reviewed.   Constitutional:       Appearance: She is obese.   HENT:      Mouth/Throat:      Mouth: Mucous membranes are moist.      Comments: Ulcers… lips  Cardiovascular:      Rate and Rhythm: Normal rate and regular rhythm.      Pulses: Normal pulses.       Heart sounds: Normal heart sounds.   Pulmonary:      Effort: Pulmonary effort is normal.      Breath sounds: Normal breath sounds.   Musculoskeletal:      Right lower leg: No edema.      Left lower leg: No edema.      Comments: Slight swelling hands   Neurological:      General: No focal deficit present.      Mental Status: She is alert and oriented to person, place, and time.         Patient's Body mass index is 35.23 kg/m². BMI is above normal parameters. Recommendations include: none (medical contraindication).      Assessment/Plan   Patient Active Problem List   Diagnosis   • Dyspnea on exertion     Diagnoses and all orders for this visit:    1. Essential hypertension (Primary)  -     Comprehensive Metabolic Panel    2. Fatigue, unspecified type  -     CBC & Differential  -     TSH  -     T4, free    3. Synovitis  -     Comprehensive Metabolic Panel  -     C-reactive Protein  -     Sedimentation Rate  -     Rheumatoid Arthritis Expanded Panel  -     ANA PAULA by IFA, Reflex 9-biomarkers profile    4. Need for hepatitis C screening test  -     Hepatitis C Antibody       Return if symptoms worsen or fail to improve, for Next scheduled follow up.    Plan above substitute Tylenol for Mobic knee aphthous ulcers and blood pressure issues-check lab advise she will need further cardiovascular work-up October 2018 was not sufficient         Electronically signed by Nikita Sahu MD 01/27/2021

## 2021-01-29 LAB
ALBUMIN SERPL-MCNC: 4.2 G/DL (ref 3.7–4.7)
ALBUMIN/GLOB SERPL: 1.6 {RATIO} (ref 1.2–2.2)
ALP SERPL-CCNC: 76 IU/L (ref 39–117)
ALT SERPL-CCNC: 19 IU/L (ref 0–32)
ANA TITR SER IF: NEGATIVE {TITER}
AST SERPL-CCNC: 18 IU/L (ref 0–40)
BASOPHILS # BLD AUTO: 0.1 X10E3/UL (ref 0–0.2)
BASOPHILS NFR BLD AUTO: 1 %
BILIRUB SERPL-MCNC: 0.4 MG/DL (ref 0–1.2)
BUN SERPL-MCNC: 21 MG/DL (ref 8–27)
BUN/CREAT SERPL: 17 (ref 12–28)
CALCIUM SERPL-MCNC: 9.3 MG/DL (ref 8.7–10.3)
CCP IGA+IGG SERPL IA-ACNC: 7 UNITS (ref 0–19)
CHLORIDE SERPL-SCNC: 106 MMOL/L (ref 96–106)
CO2 SERPL-SCNC: 27 MMOL/L (ref 20–29)
CREAT SERPL-MCNC: 1.27 MG/DL (ref 0.57–1)
CRP SERPL-MCNC: 1 MG/L (ref 0–10)
EOSINOPHIL # BLD AUTO: 0.1 X10E3/UL (ref 0–0.4)
EOSINOPHIL NFR BLD AUTO: 1 %
ERYTHROCYTE [DISTWIDTH] IN BLOOD BY AUTOMATED COUNT: 11.9 % (ref 11.7–15.4)
ERYTHROCYTE [SEDIMENTATION RATE] IN BLOOD BY WESTERGREN METHOD: NORMAL MM/HR
GLOBULIN SER CALC-MCNC: 2.7 G/DL (ref 1.5–4.5)
GLUCOSE SERPL-MCNC: 104 MG/DL (ref 65–99)
HCT VFR BLD AUTO: 39 % (ref 34–46.6)
HCV AB S/CO SERPL IA: <0.1 S/CO RATIO (ref 0–0.9)
HGB BLD-MCNC: 13.6 G/DL (ref 11.1–15.9)
IMM GRANULOCYTES # BLD AUTO: 0 X10E3/UL (ref 0–0.1)
IMM GRANULOCYTES NFR BLD AUTO: 0 %
LABORATORY COMMENT REPORT: NORMAL
LYMPHOCYTES # BLD AUTO: 2.4 X10E3/UL (ref 0.7–3.1)
LYMPHOCYTES NFR BLD AUTO: 29 %
MCH RBC QN AUTO: 31 PG (ref 26.6–33)
MCHC RBC AUTO-ENTMCNC: 34.9 G/DL (ref 31.5–35.7)
MCV RBC AUTO: 89 FL (ref 79–97)
MONOCYTES # BLD AUTO: 0.7 X10E3/UL (ref 0.1–0.9)
MONOCYTES NFR BLD AUTO: 8 %
NEUTROPHILS # BLD AUTO: 5.1 X10E3/UL (ref 1.4–7)
NEUTROPHILS NFR BLD AUTO: 61 %
PLATELET # BLD AUTO: 239 X10E3/UL (ref 150–450)
POTASSIUM SERPL-SCNC: 4.4 MMOL/L (ref 3.5–5.2)
PROT SERPL-MCNC: 6.9 G/DL (ref 6–8.5)
RBC # BLD AUTO: 4.39 X10E6/UL (ref 3.77–5.28)
RHEUMATOID FACT SERPL-ACNC: <10 IU/ML (ref 0–13.9)
SODIUM SERPL-SCNC: 146 MMOL/L (ref 134–144)
SPECIMEN STATUS: NORMAL
T4 FREE SERPL-MCNC: 1.43 NG/DL (ref 0.82–1.77)
TSH SERPL DL<=0.005 MIU/L-ACNC: 0.99 UIU/ML (ref 0.45–4.5)
WBC # BLD AUTO: 8.4 X10E3/UL (ref 3.4–10.8)

## 2021-02-09 ENCOUNTER — IMMUNIZATION (OUTPATIENT)
Dept: VACCINE CLINIC | Facility: HOSPITAL | Age: 76
End: 2021-02-09

## 2021-02-09 PROCEDURE — 0001A: CPT | Performed by: THORACIC SURGERY (CARDIOTHORACIC VASCULAR SURGERY)

## 2021-02-09 PROCEDURE — 91300 HC SARSCOV02 VAC 30MCG/0.3ML IM: CPT | Performed by: THORACIC SURGERY (CARDIOTHORACIC VASCULAR SURGERY)

## 2021-03-02 ENCOUNTER — IMMUNIZATION (OUTPATIENT)
Dept: VACCINE CLINIC | Facility: HOSPITAL | Age: 76
End: 2021-03-02

## 2021-03-02 PROCEDURE — 91300 HC SARSCOV02 VAC 30MCG/0.3ML IM: CPT | Performed by: NURSE PRACTITIONER

## 2021-03-02 PROCEDURE — 0002A: CPT | Performed by: NURSE PRACTITIONER

## 2021-09-07 ENCOUNTER — OFFICE VISIT (OUTPATIENT)
Dept: FAMILY MEDICINE CLINIC | Facility: CLINIC | Age: 76
End: 2021-09-07

## 2021-09-07 VITALS
HEART RATE: 69 BPM | TEMPERATURE: 97.5 F | BODY MASS INDEX: 34.23 KG/M2 | RESPIRATION RATE: 16 BRPM | WEIGHT: 186 LBS | SYSTOLIC BLOOD PRESSURE: 146 MMHG | DIASTOLIC BLOOD PRESSURE: 78 MMHG | HEIGHT: 62 IN | OXYGEN SATURATION: 99 %

## 2021-09-07 DIAGNOSIS — M25.559 HIP PAIN: ICD-10-CM

## 2021-09-07 DIAGNOSIS — R52 PAIN: Primary | ICD-10-CM

## 2021-09-07 PROCEDURE — 99213 OFFICE O/P EST LOW 20 MIN: CPT | Performed by: FAMILY MEDICINE

## 2021-09-07 NOTE — PROGRESS NOTES
Subjective   Benedict Gomez is a 76 y.o. female.     76-year-old female with increasing pain right hip      The following portions of the patient's history were reviewed and updated as appropriate: allergies, current medications, past family history, past medical history, past social history, past surgical history and problem list.    Review of Systems   Cardiovascular: Negative for chest pain and leg swelling.   Musculoskeletal: Positive for arthralgias and back pain.        Has history of spinal stenosis       Objective   Physical Exam  Vitals and nursing note reviewed.   Constitutional:       Appearance: She is obese.   Cardiovascular:      Rate and Rhythm: Normal rate and regular rhythm.      Pulses: Normal pulses.      Heart sounds: Normal heart sounds.   Musculoskeletal:      Right lower leg: No edema.      Left lower leg: No edema.   Neurological:      General: No focal deficit present.      Mental Status: She is alert and oriented to person, place, and time.      Comments: Patellar reflex decreased on right   Psychiatric:         Mood and Affect: Mood normal.         Behavior: Behavior normal.         Thought Content: Thought content normal.         Judgment: Judgment normal.         Assessment/Plan   Diagnoses and all orders for this visit:    1. Pain (Primary)    2. Hip pain       Plan x-ray pelvis and hips-history of spinal stenosis

## 2021-09-08 ENCOUNTER — OFFICE VISIT (OUTPATIENT)
Dept: FAMILY MEDICINE CLINIC | Facility: CLINIC | Age: 76
End: 2021-09-08

## 2021-09-08 VITALS
HEART RATE: 65 BPM | HEIGHT: 62 IN | TEMPERATURE: 96.8 F | WEIGHT: 186.8 LBS | DIASTOLIC BLOOD PRESSURE: 74 MMHG | SYSTOLIC BLOOD PRESSURE: 128 MMHG | RESPIRATION RATE: 18 BRPM | BODY MASS INDEX: 34.37 KG/M2 | OXYGEN SATURATION: 97 %

## 2021-09-08 DIAGNOSIS — E78.2 MIXED HYPERLIPIDEMIA: ICD-10-CM

## 2021-09-08 DIAGNOSIS — Z78.0 POST-MENOPAUSAL: ICD-10-CM

## 2021-09-08 DIAGNOSIS — I10 ESSENTIAL HYPERTENSION: Primary | ICD-10-CM

## 2021-09-08 DIAGNOSIS — R41.3 MEMORY LOSS: ICD-10-CM

## 2021-09-08 DIAGNOSIS — R53.83 FATIGUE, UNSPECIFIED TYPE: ICD-10-CM

## 2021-09-08 DIAGNOSIS — Z12.11 SCREENING FOR COLORECTAL CANCER: ICD-10-CM

## 2021-09-08 DIAGNOSIS — Z00.00 MEDICARE ANNUAL WELLNESS VISIT, SUBSEQUENT: ICD-10-CM

## 2021-09-08 DIAGNOSIS — Z12.12 SCREENING FOR COLORECTAL CANCER: ICD-10-CM

## 2021-09-08 LAB
BILIRUB BLD-MCNC: NEGATIVE MG/DL
CLARITY, POC: CLEAR
COLOR UR: YELLOW
GLUCOSE UR STRIP-MCNC: NEGATIVE MG/DL
KETONES UR QL: NEGATIVE
LEUKOCYTE EST, POC: ABNORMAL
NITRITE UR-MCNC: NEGATIVE MG/ML
PH UR: 5.5 [PH] (ref 5–8)
PROT UR STRIP-MCNC: NEGATIVE MG/DL
RBC # UR STRIP: ABNORMAL /UL
SP GR UR: 1.03 (ref 1–1.03)
UROBILINOGEN UR QL: NORMAL

## 2021-09-08 PROCEDURE — 81003 URINALYSIS AUTO W/O SCOPE: CPT | Performed by: FAMILY MEDICINE

## 2021-09-08 PROCEDURE — G0439 PPPS, SUBSEQ VISIT: HCPCS | Performed by: FAMILY MEDICINE

## 2021-09-08 NOTE — PROGRESS NOTES
The ABCs of the Annual Wellness Visit  Subsequent Medicare Wellness Visit    Chief Complaint   Patient presents with   • Medicare Wellness-subsequent     Fasting      Subjective    History of Present Illness:  Benedict Gomez is a 76 y.o. female who presents for a Subsequent Medicare Wellness Visit.    The following portions of the patient's history were reviewed and   updated as appropriate: allergies, current medications, past family history, past medical history, past social history, past surgical history and problem list.     Compared to one year ago, the patient feels her physical   health is the same.    Compared to one year ago, the patient feels her mental   health is the same.    Recent Hospitalizations:  She was not admitted to the hospital during the last year.       Current Medical Providers:  Patient Care Team:  Nikita Sahu MD as PCP - General (Family Medicine)    Outpatient Medications Prior to Visit   Medication Sig Dispense Refill   • Cholecalciferol (Vitamin D3) 25 MCG (1000 UT) capsule Take 1 each by mouth Daily.     • losartan-hydrochlorothiazide (HYZAAR) 50-12.5 MG per tablet Take 1 tablet by mouth Daily.     • meloxicam (MOBIC) 7.5 MG tablet Take 1 tablet by mouth Every Other Day.     • multivitamin with minerals (ONE-A-DAY WOMENS 50+ ADVANTAGE PO) Take 1 tablet by mouth Daily.     • pilocarpine (SALAGEN) 5 MG tablet Take 5 mg by mouth Daily.     • Probiotic Product ([place] PO) Take 1 tablet by mouth Daily.       No facility-administered medications prior to visit.       No opioid medication identified on active medication list. I have reviewed chart for other potential  high risk medication/s and harmful drug interactions in the elderly.          Aspirin is not on active medication list.  Aspirin use is not indicated based on review of current medical condition/s. Risk of harm outweighs potential benefits.  .    Patient Active Problem List   Diagnosis   •  "Dyspnea on exertion     Advance Care Planning   Advance Directive is not on file.  ACP discussion was declined by the patient. Patient does not have an advance directive, declines further assistance.    Review of Systems   Respiratory: Positive for apnea. Negative for shortness of breath.         Has TATA-uses CPAP with compliance and cannot leave home without it-continue lifetime because of comorbidities   Cardiovascular: Negative for chest pain and leg swelling.   Gastrointestinal:        Colonoscopy approximately 4 years ago   Genitourinary: Negative for difficulty urinating.   Musculoskeletal: Positive for arthralgias and back pain.        Hip pain   All other systems reviewed and are negative.       Objective       Vitals:    09/08/21 0805   BP: 128/74   BP Location: Left arm   Patient Position: Sitting   Cuff Size: Large Adult   Pulse: 65   Resp: 18   Temp: 96.8 °F (36 °C)   TempSrc: Temporal   SpO2: 97%   Weight: 84.7 kg (186 lb 12.8 oz)   Height: 157.5 cm (62\")   PainSc:   3     BMI Readings from Last 1 Encounters:   09/08/21 34.17 kg/m²   BMI is above normal parameters. Recommendations include: educational material and exercise counseling    Does the patient have evidence of cognitive impairment? No    Physical Exam  Vitals and nursing note reviewed.   Constitutional:       Appearance: She is obese.   HENT:      Right Ear: Tympanic membrane and ear canal normal.      Left Ear: Tympanic membrane and ear canal normal.   Eyes:      Extraocular Movements: Extraocular movements intact.      Pupils: Pupils are equal, round, and reactive to light.   Neck:      Vascular: No carotid bruit.   Cardiovascular:      Rate and Rhythm: Normal rate and regular rhythm.      Pulses: Normal pulses.      Heart sounds: Normal heart sounds.   Pulmonary:      Effort: Pulmonary effort is normal.      Breath sounds: Normal breath sounds.      Comments: Breast no masses noted  Abdominal:      General: Abdomen is flat. There is no " distension.      Palpations: Abdomen is soft. There is no mass.   Genitourinary:     Comments: Surgical past  Musculoskeletal:      Right lower leg: No edema.      Left lower leg: No edema.   Lymphadenopathy:      Cervical: No cervical adenopathy.   Skin:     General: Skin is warm and dry.      Capillary Refill: Capillary refill takes less than 2 seconds.   Neurological:      General: No focal deficit present.      Mental Status: She is alert and oriented to person, place, and time.   Psychiatric:         Mood and Affect: Mood normal.         Behavior: Behavior normal.         Thought Content: Thought content normal.         Judgment: Judgment normal.                 HEALTH RISK ASSESSMENT    Smoking Status:  Social History     Tobacco Use   Smoking Status Never Smoker   Smokeless Tobacco Never Used     Alcohol Consumption:  Social History     Substance and Sexual Activity   Alcohol Use No     Fall Risk Screen:    STEADI Fall Risk Assessment was completed, and patient is at LOW risk for falls.Assessment completed on:9/8/2021    Depression Screening:  PHQ-2/PHQ-9 Depression Screening 9/8/2021   Little interest or pleasure in doing things 0   Feeling down, depressed, or hopeless 0   Total Score 0       Health Habits and Functional and Cognitive Screening:  Functional & Cognitive Status 9/8/2021   Do you have difficulty preparing food and eating? No   Do you have difficulty bathing yourself, getting dressed or grooming yourself? No   Do you have difficulty using the toilet? No   Do you have difficulty moving around from place to place? No   Do you have trouble with steps or getting out of a bed or a chair? Yes   Current Diet Well Balanced Diet   Dental Exam Up to date   Eye Exam Up to date   Exercise (times per week) 7 times per week   Current Exercises Include Walking   Do you need help using the phone?  No   Are you deaf or do you have serious difficulty hearing?  No   Do you need help with transportation? Yes   Do  you need help shopping? No   Do you need help preparing meals?  No   Do you need help with housework?  No   Do you need help with laundry? No   Do you need help taking your medications? No   Do you need help managing money? No   Do you ever drive or ride in a car without wearing a seat belt? No   Have you felt unusual stress, anger or loneliness in the last month? No   Who do you live with? Spouse   If you need help, do you have trouble finding someone available to you? No   Have you been bothered in the last four weeks by sexual problems? No   Do you have difficulty concentrating, remembering or making decisions? No       Age-appropriate Screening Schedule:  Refer to the list below for future screening recommendations based on patient's age, sex and/or medical conditions. Orders for these recommended tests are listed in the plan section. The patient has been provided with a written plan.    Health Maintenance   Topic Date Due   • DXA SCAN  Never done   • LIPID PANEL  09/08/2021   • ZOSTER VACCINE (2 of 3) 09/15/2022 (Originally 2/1/2018)   • INFLUENZA VACCINE  10/01/2021   • TDAP/TD VACCINES (2 - Td or Tdap) 12/14/2027              Assessment/Plan     CMS Preventative Services Quick Reference  Risk Factors Identified During Encounter  Fall Risk-High or Moderate  The above risks/problems have been discussed with the patient.  Follow up actions/plans if indicated are seen below in the Assessment/Plan Section.  Pertinent information has been shared with the patient in the After Visit Summary.    Diagnoses and all orders for this visit:    1. Essential hypertension (Primary)  -     POC Urinalysis Dipstick, Multipro    2. Fatigue, unspecified type  -     TSH  -     T4, free  -     CBC & Differential    3. Mixed hyperlipidemia  -     Comprehensive Metabolic Panel  -     Lipid Panel    4. Screening for colorectal cancer  -     Cologuard - Stool, Per Rectum; Future    5. Memory loss  -     Vitamin B12  -     Folate    6.  Post-menopausal  -     DEXA Bone Density Axial; Future    7. Medicare annual wellness visit, subsequent        Follow Up:   Return in about 6 months (around 3/8/2022).     An After Visit Summary and PPPS were given to the patient.    I spent 25 minutes caring for Benedict on this date of service. This time includes time spent by me in the following activities:preparing for the visit, reviewing tests, obtaining and/or reviewing a separately obtained history, performing a medically appropriate examination and/or evaluation , counseling and educating the patient/family/caregiver, ordering medications, tests, or procedures and documenting information in the medical record       Plan-above

## 2021-09-08 NOTE — PATIENT INSTRUCTIONS
Medicare Wellness  Personal Prevention Plan of Service     Date of Office Visit:    Encounter Provider:  Nikita Sahu MD  Place of Service:  [facility]  Patient Name: Benedict Gomez  :  1945    As part of the Medicare Wellness portion of your visit today, we are providing you with this personalized preventive plan of services (PPPS). This plan is based upon recommendations of the [place] Preventive Services Task Force (USPSTF) and the Advisory Committee on Immunization Practices (ACIP).    This lists the preventive care services that should be considered, and provides dates of when you are due. Items listed as completed are up-to-date and do not require any further intervention.    Health Maintenance   Topic Date Due   • DXA SCAN  Never done   • COLORECTAL CANCER SCREENING  Never done   • LIPID PANEL  2021   • ZOSTER VACCINE (2 of 3) 09/15/2022 (Originally 2018)   • INFLUENZA VACCINE  10/01/2021   • ANNUAL WELLNESS VISIT  2022   • TDAP/TD VACCINES (2 - Td or Tdap) 2027   • HEPATITIS C SCREENING  Completed   • COVID-19 Vaccine  Completed   • Pneumococcal Vaccine 65+  Completed       No orders of the defined types were placed in this encounter.      No follow-ups on file.        Fall Prevention in the Home, Adult  Falls can cause injuries and can affect people from all age groups. There are many simple things that you can do to make your home safe and to help prevent falls. Ask for help when making these changes, if needed.  What actions can I take to prevent falls?  General instructions  · Use good lighting in all rooms. Replace any light bulbs that burn out.  · Turn on lights if it is dark. Use night-lights.  · Place frequently used items in easy-to-reach places. Lower the shelves around your home if necessary.  · Set up furniture so that there are clear paths around it. Avoid moving your furniture around.  · Remove throw rugs  and other tripping hazards from the floor.  · Avoid walking on wet floors.  · Fix any uneven floor surfaces.  · Add color or contrast paint or tape to grab bars and handrails in your home. Place contrasting color strips on the first and last steps of stairways.  · When you use a stepladder, make sure that it is completely opened and that the sides are firmly locked. Have someone hold the ladder while you are using it. Do not climb a closed stepladder.  · Be aware of any and all pets.  What can I do in the bathroom?         · Keep the floor dry. Immediately clean up any water that spills onto the floor.  · Remove soap buildup in the tub or shower on a regular basis.  · Use non-skid mats or decals on the floor of the tub or shower.  · Attach bath mats securely with double-sided, non-slip rug tape.  · If you need to sit down while you are in the shower, use a plastic, non-slip stool.  · Install grab bars by the toilet and in the tub and shower. Do not use towel bars as grab bars.  What can I do in the bedroom?  · Make sure that a bedside light is easy to reach.  · Do not use oversized bedding that drapes onto the floor.  · Have a firm chair that has side arms to use for getting dressed.  What can I do in the kitchen?  · Clean up any spills right away.  · If you need to reach for something above you, use a sturdy step stool that has a grab bar.  · Keep electrical cables out of the way.  · Do not use floor polish or wax that makes floors slippery. If you must use wax, make sure that it is non-skid floor wax.  What can I do in the stairways?  · Do not leave any items on the stairs.  · Make sure that you have a light switch at the top of the stairs and the bottom of the stairs. Have them installed if you do not have them.  · Make sure that there are handrails on both sides of the stairs. Fix handrails that are broken or loose. Make sure that handrails are as long as the stairways.  · Install non-slip stair treads on all  stairs in your home.  · Avoid having throw rugs at the top or bottom of stairways, or secure the rugs with carpet tape to prevent them from moving.  · Choose a carpet design that does not hide the edge of steps on the stairway.  · Check any carpeting to make sure that it is firmly attached to the stairs. Fix any carpet that is loose or worn.  What can I do on the outside of my home?  · Use bright outdoor lighting.  · Regularly repair the edges of walkways and driveways and fix any cracks.  · Remove high doorway thresholds.  · Trim any shrubbery on the main path into your home.  · Regularly check that handrails are securely fastened and in good repair. Both sides of any steps should have handrails.  · Install guardrails along the edges of any raised decks or porches.  · Clear walkways of debris and clutter, including tools and rocks.  · Have leaves, snow, and ice cleared regularly.  · Use sand or salt on walkways during winter months.  · In the garage, clean up any spills right away, including grease or oil spills.  What other actions can I take?  · Wear closed-toe shoes that fit well and support your feet. Wear shoes that have rubber soles or low heels.  · Use mobility aids as needed, such as canes, walkers, scooters, and crutches.  · Review your medicines with your health care provider. Some medicines can cause dizziness or changes in blood pressure, which increase your risk of falling.  Talk with your health care provider about other ways that you can decrease your risk of falls. This may include working with a physical therapist or  to improve your strength, balance, and endurance.  Where to find more information  · Centers for Disease Control and Prevention, STEADI: https://www.cdc.gov  · National [place] on Aging: https://be7ostw.alisha.nih.gov  Contact a health care provider if:  · You are afraid of falling at home.  · You feel weak, drowsy, or dizzy at home.  · You fall at home.  Summary  · There are  many simple things that you can do to make your home safe and to help prevent falls.  · Ways to make your home safe include removing tripping hazards and installing grab bars in the bathroom.  · Ask for help when making these changes in your home.  This information is not intended to replace advice given to you by your health care provider. Make sure you discuss any questions you have with your health care provider.  Document Revised: 11/30/2018 Document Reviewed: 08/02/2018  [employer]vier Patient Education © 2021 [employer]

## 2021-09-09 DIAGNOSIS — E78.2 MIXED HYPERLIPIDEMIA: Primary | ICD-10-CM

## 2021-09-09 DIAGNOSIS — E53.8 VITAMIN B12 DEFICIENCY: ICD-10-CM

## 2021-09-09 LAB
ALBUMIN SERPL-MCNC: 4.4 G/DL (ref 3.5–5.2)
ALBUMIN/GLOB SERPL: 1.8 G/DL
ALP SERPL-CCNC: 80 U/L (ref 39–117)
ALT SERPL-CCNC: 20 U/L (ref 1–33)
AST SERPL-CCNC: 21 U/L (ref 1–32)
BASOPHILS # BLD AUTO: 0.07 10*3/MM3 (ref 0–0.2)
BASOPHILS NFR BLD AUTO: 1 % (ref 0–1.5)
BILIRUB SERPL-MCNC: 0.6 MG/DL (ref 0–1.2)
BUN SERPL-MCNC: 25 MG/DL (ref 8–23)
BUN/CREAT SERPL: 22.9 (ref 7–25)
CALCIUM SERPL-MCNC: 9.1 MG/DL (ref 8.6–10.5)
CHLORIDE SERPL-SCNC: 108 MMOL/L (ref 98–107)
CHOLEST SERPL-MCNC: 183 MG/DL (ref 0–200)
CO2 SERPL-SCNC: 28.1 MMOL/L (ref 22–29)
CREAT SERPL-MCNC: 1.09 MG/DL (ref 0.57–1)
EOSINOPHIL # BLD AUTO: 0.11 10*3/MM3 (ref 0–0.4)
EOSINOPHIL NFR BLD AUTO: 1.6 % (ref 0.3–6.2)
ERYTHROCYTE [DISTWIDTH] IN BLOOD BY AUTOMATED COUNT: 12.5 % (ref 12.3–15.4)
FOLATE SERPL-MCNC: 6.44 NG/ML (ref 4.78–24.2)
GLOBULIN SER CALC-MCNC: 2.5 GM/DL
GLUCOSE SERPL-MCNC: 91 MG/DL (ref 65–99)
HCT VFR BLD AUTO: 42.9 % (ref 34–46.6)
HDLC SERPL-MCNC: 57 MG/DL (ref 40–60)
HGB BLD-MCNC: 13.8 G/DL (ref 12–15.9)
IMM GRANULOCYTES # BLD AUTO: 0.01 10*3/MM3 (ref 0–0.05)
IMM GRANULOCYTES NFR BLD AUTO: 0.1 % (ref 0–0.5)
LDLC SERPL CALC-MCNC: 111 MG/DL (ref 0–100)
LYMPHOCYTES # BLD AUTO: 2.15 10*3/MM3 (ref 0.7–3.1)
LYMPHOCYTES NFR BLD AUTO: 30.5 % (ref 19.6–45.3)
MCH RBC QN AUTO: 30.5 PG (ref 26.6–33)
MCHC RBC AUTO-ENTMCNC: 32.2 G/DL (ref 31.5–35.7)
MCV RBC AUTO: 94.9 FL (ref 79–97)
MONOCYTES # BLD AUTO: 0.6 10*3/MM3 (ref 0.1–0.9)
MONOCYTES NFR BLD AUTO: 8.5 % (ref 5–12)
NEUTROPHILS # BLD AUTO: 4.11 10*3/MM3 (ref 1.7–7)
NEUTROPHILS NFR BLD AUTO: 58.3 % (ref 42.7–76)
NRBC BLD AUTO-RTO: 0 /100 WBC (ref 0–0.2)
PLATELET # BLD AUTO: 241 10*3/MM3 (ref 140–450)
POTASSIUM SERPL-SCNC: 4.4 MMOL/L (ref 3.5–5.2)
PROT SERPL-MCNC: 6.9 G/DL (ref 6–8.5)
RBC # BLD AUTO: 4.52 10*6/MM3 (ref 3.77–5.28)
SODIUM SERPL-SCNC: 143 MMOL/L (ref 136–145)
T4 FREE SERPL-MCNC: 1.3 NG/DL (ref 0.93–1.7)
TRIGL SERPL-MCNC: 84 MG/DL (ref 0–150)
TSH SERPL DL<=0.005 MIU/L-ACNC: 2.09 UIU/ML (ref 0.27–4.2)
VIT B12 SERPL-MCNC: 296 PG/ML (ref 211–946)
VLDLC SERPL CALC-MCNC: 15 MG/DL (ref 5–40)
WBC # BLD AUTO: 7.05 10*3/MM3 (ref 3.4–10.8)

## 2021-09-09 RX ORDER — LANOLIN ALCOHOL/MO/W.PET/CERES
1000 CREAM (GRAM) TOPICAL DAILY
Qty: 90 TABLET | Refills: 3 | Status: SHIPPED | OUTPATIENT
Start: 2021-09-09

## 2021-09-09 RX ORDER — ATORVASTATIN CALCIUM 20 MG/1
20 TABLET, FILM COATED ORAL NIGHTLY
Qty: 90 TABLET | Refills: 3 | Status: SHIPPED | OUTPATIENT
Start: 2021-09-09 | End: 2022-09-13 | Stop reason: DRUGHIGH

## 2021-10-26 ENCOUNTER — FLU SHOT (OUTPATIENT)
Dept: FAMILY MEDICINE CLINIC | Facility: CLINIC | Age: 76
End: 2021-10-26

## 2021-10-26 DIAGNOSIS — Z23 NEED FOR INFLUENZA VACCINATION: Primary | ICD-10-CM

## 2021-10-26 PROCEDURE — 90662 IIV NO PRSV INCREASED AG IM: CPT | Performed by: FAMILY MEDICINE

## 2021-10-26 PROCEDURE — G0008 ADMIN INFLUENZA VIRUS VAC: HCPCS | Performed by: FAMILY MEDICINE

## 2022-03-07 ENCOUNTER — OFFICE VISIT (OUTPATIENT)
Dept: FAMILY MEDICINE CLINIC | Facility: CLINIC | Age: 77
End: 2022-03-07

## 2022-03-07 VITALS
HEIGHT: 62 IN | SYSTOLIC BLOOD PRESSURE: 138 MMHG | DIASTOLIC BLOOD PRESSURE: 70 MMHG | OXYGEN SATURATION: 97 % | RESPIRATION RATE: 20 BRPM | BODY MASS INDEX: 35.51 KG/M2 | HEART RATE: 78 BPM | WEIGHT: 193 LBS | TEMPERATURE: 97.7 F

## 2022-03-07 DIAGNOSIS — R52 PAIN: Primary | ICD-10-CM

## 2022-03-07 PROCEDURE — 99213 OFFICE O/P EST LOW 20 MIN: CPT | Performed by: FAMILY MEDICINE

## 2022-03-07 RX ORDER — HYDROXYCHLOROQUINE SULFATE 200 MG/1
200 TABLET, FILM COATED ORAL 2 TIMES DAILY
COMMUNITY
Start: 2022-02-08

## 2022-03-07 NOTE — PROGRESS NOTES
"Benedict Gomez    1945    Chief Complaint   Patient presents with   • Foot Pain     Knot on top of Left foot   • Back Pain       Vitals:    03/07/22 1253   BP: 138/70   BP Location: Left arm   Patient Position: Sitting   Cuff Size: Large Adult   Pulse: 78   Resp: 20   Temp: 97.7 °F (36.5 °C)   TempSrc: Temporal   SpO2: 97%   Weight: 87.5 kg (193 lb)   Height: 157.5 cm (62\")   PainSc:   4   PainLoc: Foot       76-year-old female complaining of back pain longstanding history of spinal stenosis which is progressing      Review of Systems   Respiratory: Negative for shortness of breath.    Cardiovascular: Negative for chest pain and leg swelling.   Musculoskeletal: Positive for arthralgias and back pain.        Ganglion left forefoot       Past Medical History:   Diagnosis Date   • Hypertension    • Spinal stenosis of lumbar region          Current Outpatient Medications:   •  atorvastatin (LIPITOR) 20 MG tablet, Take 1 tablet by mouth Every Night., Disp: 90 tablet, Rfl: 3  •  Cholecalciferol (Vitamin D3) 25 MCG (1000 UT) capsule, Take 1 each by mouth Daily., Disp: , Rfl:   •  losartan-hydrochlorothiazide (HYZAAR) 50-12.5 MG per tablet, Take 1 tablet by mouth Daily., Disp: , Rfl:   •  meloxicam (MOBIC) 7.5 MG tablet, Take 1 tablet by mouth Every Other Day., Disp: , Rfl:   •  multivitamin with minerals tablet tablet, Take 1 tablet by mouth Daily., Disp: , Rfl:   •  pilocarpine (SALAGEN) 5 MG tablet, Take 5 mg by mouth Daily., Disp: , Rfl:   •  Probiotic Product (No [employer] PO), Take 1 tablet by mouth Daily., Disp: , Rfl:   •  vitamin B-12 (CYANOCOBALAMIN) 1000 MCG tablet, Take 1 tablet by mouth Daily., Disp: 90 tablet, Rfl: 3  •  hydroxychloroquine (PLAQUENIL) 200 MG tablet, Take 200 mg by mouth 2 (Two) Times a Day., Disp: , Rfl:     Allergies   Allergen Reactions   • Ace Inhibitors Cough   • Propoxyphene Rash   • Penicillins Hives       Patient Active Problem List   Diagnosis   • Dyspnea on " "exertion       Social History     Socioeconomic History   • Marital status:    Tobacco Use   • Smoking status: Never Smoker   • Smokeless tobacco: Never Used   Substance and Sexual Activity   • Alcohol use: No   • Drug use: No       Past Surgical History:   Procedure Laterality Date   • CHOLECYSTECTOMY     • ECTOPIC PREGNANCY SURGERY     • HYSTERECTOMY         Physical Exam  Vitals and nursing note reviewed.   Constitutional:       Appearance: She is obese.   Eyes:      Extraocular Movements: Extraocular movements intact.      Pupils: Pupils are equal, round, and reactive to light.   Cardiovascular:      Rate and Rhythm: Normal rate and regular rhythm.      Pulses: Normal pulses.      Heart sounds: Normal heart sounds.   Pulmonary:      Effort: Pulmonary effort is normal.      Breath sounds: Normal breath sounds.   Musculoskeletal:      Right lower leg: No edema.      Left lower leg: No edema.      Comments: Progressive pain low back-remote x-ray showed severe spinal stenosis-will need surgical intervention   Neurological:      General: No focal deficit present.      Mental Status: She is alert and oriented to person, place, and time.   Psychiatric:         Mood and Affect: Mood normal.         Behavior: Behavior normal.         Thought Content: Thought content normal.         Judgment: Judgment normal.         Vitals:    03/07/22 1253   BP: 138/70   BP Location: Left arm   Patient Position: Sitting   Cuff Size: Large Adult   Pulse: 78   Resp: 20   Temp: 97.7 °F (36.5 °C)   TempSrc: Temporal   SpO2: 97%   Weight: 87.5 kg (193 lb)   Height: 157.5 cm (62\")     Patient's Body mass index is 35.3 kg/m². indicating that she is obese (BMI >30). Obesity-related health conditions include the following: hypertension and dyslipidemias. Obesity is unchanged. BMI is is above average; BMI management plan is completed. We discussed portion control and increasing exercise..      Diagnoses and all orders for this visit:    1. " Pain (Primary)  -     Ambulatory Referral to Neurosurgery        Problems Addressed this Visit    None     Visit Diagnoses     Pain    -  Primary    Relevant Orders    Ambulatory Referral to Neurosurgery (Completed)      Diagnoses       Codes Comments    Pain    -  Primary ICD-10-CM: R52  ICD-9-CM: 780.96           Health Maintenance:  Immunization History   Administered Date(s) Administered   • COVID-19 (PFIZER) PURPLE CAP 02/09/2021, 03/02/2021, 12/07/2021   • Flu Vaccine Quad PF >18YRS 09/14/2020   • Flu Vaccine Split Quad 01/15/2020, 09/14/2020   • Fluzone High-Dose 65+yrs 10/26/2021   • Influenza Quad Vaccine (Inpatient) 09/14/2020   • Pneumococcal Conjugate 13-Valent (PCV13) 12/03/2015   • Pneumococcal Polysaccharide (PPSV23) 08/22/2012, 07/23/2020   • Tdap 12/14/2017   • Zostavax 12/07/2017              Plan above-progressive pain history of spinal stenosis                    Electronically signed by Nikita Sahu MD 03/07/2022

## 2022-03-29 DIAGNOSIS — M48.061 SPINAL STENOSIS OF LUMBAR REGION, UNSPECIFIED WHETHER NEUROGENIC CLAUDICATION PRESENT: Primary | ICD-10-CM

## 2022-04-13 DIAGNOSIS — M54.50 CHRONIC BILATERAL LOW BACK PAIN WITHOUT SCIATICA: Primary | ICD-10-CM

## 2022-04-13 DIAGNOSIS — G89.29 CHRONIC BILATERAL LOW BACK PAIN WITHOUT SCIATICA: Primary | ICD-10-CM

## 2022-04-25 ENCOUNTER — HOSPITAL ENCOUNTER (OUTPATIENT)
Dept: PHYSICAL THERAPY | Facility: HOSPITAL | Age: 77
Setting detail: THERAPIES SERIES
Discharge: HOME | End: 2022-04-25

## 2022-04-25 DIAGNOSIS — M54.50 CHRONIC BILATERAL LOW BACK PAIN WITHOUT SCIATICA: Primary | ICD-10-CM

## 2022-04-25 DIAGNOSIS — G89.29 CHRONIC BILATERAL LOW BACK PAIN WITHOUT SCIATICA: Primary | ICD-10-CM

## 2022-04-25 PROCEDURE — 97110 THERAPEUTIC EXERCISES: CPT | Performed by: PHYSICAL THERAPIST

## 2022-04-25 PROCEDURE — 97140 MANUAL THERAPY 1/> REGIONS: CPT | Performed by: PHYSICAL THERAPIST

## 2022-04-25 PROCEDURE — 97161 PT EVAL LOW COMPLEX 20 MIN: CPT | Performed by: PHYSICAL THERAPIST

## 2022-04-26 NOTE — THERAPY EVALUATION
Outpatient Physical Therapy Ortho Initial Evaluation  [facility]     Patient Name: Benedict Gomez  : 1945  MRN: [id number]  Today's Date: 2022      Visit Date: 2022     Attendance: 1 visits  Subjective improvement: N/A  MD appt: PRN  Recheck due: 2022      Patient Active Problem List   Diagnosis   • Dyspnea on exertion        Past Medical History:   Diagnosis Date   • Hypertension    • Spinal stenosis of lumbar region         Past Surgical History:   Procedure Laterality Date   • CHOLECYSTECTOMY     • ECTOPIC PREGNANCY SURGERY     • HYSTERECTOMY         Visit Dx:     ICD-10-CM ICD-9-CM   1. Chronic bilateral low back pain without sciatica  M54.50 724.2    G89.29 338.29          Patient History     Row Name 22 0900             History    Chief Complaint Difficulty Walking;Difficulty with daily activities;Joint stiffness;Muscle weakness;Pain  -KG      Type of Pain Back pain  -KG      Date Current Problem(s) Began --  2 years  -KG      Brief Description of Current Complaint Pt presents with chronic low back and RLE pain. Pain travels down lateral thigh, medial knee, top of foot. Leg pain comes & goes but back pain constant. States she hurts all the time. By the end of the day she's walking bent over. Started about 2 years ago and seems to be getting worse. Has had spinal injections in [place] in the past & they didn't help. Has a cane but doesn't use it. Lives in single level home, 2 steps to enter with thomas handrail assist.  -KG      Patient/Caregiver Goals Relieve pain;Improve mobility;Improve strength;Know what to do to help the symptoms  -KG      Patient/Caregiver Goals Comment Walk better, getting around better  -KG      Hand Dominance right-handed  -KG      Occupation/sports/leisure activities Pt enjoys working puzzles. Enjoys working in garden (hasn't in a bout 1 year due to pain)  -KG      What clinical tests have you had for this problem? MRI  -KG       Results of Clinical Tests Mod degenerative change L3-4, facet deg change L5-S1. No significant spinal stenosis or nerve root compression  -KG              Pain     Pain Location Back  -KG      Pain at Present 5  -KG      Pain at Best 5  -KG      Pain at Worst 10  -KG      Pain Frequency Constant/continuous  -KG      Pain Description Aching;Sharp;Radiating;Tightness  -KG      What Performance Factors Make the Current Problem(s) WORSE? Walking, standing, lifting, housework, being up on feet  -KG      What Performance Factors Make the Current Problem(s) BETTER? Sitting, laying on back  -KG      Is your sleep disturbed? Yes  once finds a comfortable position, she sleeps well  -KG      What position do you sleep in? Supine  -KG              Fall Risk Assessment    Any falls in the past year: Yes  -KG      Number of falls reported in the last 12 months 1; last week, R leg gave out on her in the bathroom  -KG              Services    Are you currently receiving Home Health services No  -KG      Do you plan to receive Home Health services in the near future No  -KG            User Key  (r) = Recorded By, (t) = Taken By, (c) = Cosigned By    Initials Name Provider Type    KG Nahomy Huynh, PT Physical Therapist                 PT Ortho     Row Name 04/25/22 0900       Precautions and Contraindications    Precautions/Limitations no known precautions/limitations  -KG       Subjective Pain    Able to rate subjective pain? yes  -KG    Pre-Treatment Pain Level 5  -KG       Posture/Observations    Posture/Observations Comments Decreased overall postural awareness. Forward flexed at hips. Slightly slumped when seated. Good tolerance to sitting and supine positioning. Supine L IC superior vs R. LLE short. More equal after LAD.  -KG       Quarter Clearing    Quarter Clearing Lower Quarter Clearing  -KG       DTR- Lower Quarter Clearing    Patellar tendon (L2-4) Bilateral:;2- Normal response  -KG    Achilles tendon (S1-2)  "Bilateral:;2- Normal response  -KG       Neural Tension Signs- Lower Quarter Clearing    Slump Right:;Positive  -KG    SLR Bilateral:;Negative  -KG       Sensory Screen for Light Touch- Lower Quarter Clearing    L1 (inguinal area) Bilateral:;Intact  -KG    L2 (anterior mid thigh) Bilateral:;Intact  -KG    L3 (distal anterior thigh) Bilateral:;Intact  -KG    L4 (medial lower leg/foot) Bilateral:;Intact  -KG    L5 (lateral lower leg/great toe) Bilateral:;Intact  -KG    S1 (bottom of foot) Bilateral:;Intact  -KG       SI/Hip Screen- Lower Quarter Clearing    ASIS compression Negative  -KG    ASIS distraction Negative  -KG    Tiffani's/Rogelio's test Right:;Positive;Left:;Negative  -KG    Posterior thigh sheer Negative  -KG       Special Tests/Palpation    Special Tests/Palpation Lumbar/SI  -KG       Lumbosacral Palpation    Lumbosacral Palpation? Yes  -KG    Piriformis Right:;Tender  -KG    Gluteus Harry Right:;Tender;Guarded/taut  -KG    Erector Spinae (Paraspinals) Bilateral:;Tender;Guarded/taut  R>L  -KG    Lumbosacral Palpation Comments TTP R lateral quad  -KG       General ROM    Head/Neck/Trunk Trunk Extension;Trunk Flexion;Trunk Lt Lateral Flexion;Trunk Rt Lateral Flexion  -KG    GENERAL ROM COMMENTS Bilateral hip, knee, ankle AROM WFL.  -KG       Head/Neck/Trunk    Trunk Extension AROM 25% with increased low back pain  -KG    Trunk Flexion AROM Fingertips 3\" below knee jt line wtih slight discomfort  -KG    Trunk Lt Lateral Flexion AROM 75% full range; pain contralateral  -KG    Trunk Rt Lateral Flexion AROM 75% full range; pain contralateral  -KG       MMT (Manual Muscle Testing)    Rt Lower Ext Rt Hip Flexion;Rt Hip ABduction;Rt Hip ADduction;Rt Knee Extension;Rt Knee Flexion;Rt Ankle Plantarflexion;Rt Ankle Dorsiflexion  -KG    Lt Lower Ext Lt Hip Flexion;Lt Hip ABduction;Lt Hip ADduction;Lt Knee Extension;Lt Knee Flexion;Lt Ankle Plantarflexion;Lt Ankle Dorsiflexion  -KG       MMT Right Lower Ext    Rt Hip " Flexion MMT, Gross Movement (4+/5) good plus  -KG    Rt Hip ABduction MMT, Gross Movement (4/5) good  -KG    Rt Hip ADduction MMT, Gross Movement (4/5) good  -KG    Rt Knee Extension MMT, Gross Movement (4+/5) good plus  -KG    Rt Knee Flexion MMT, Gross Movement (4/5) good  -KG    Rt Ankle Plantarflexion MMT, Gross Movement (5/5) normal  -KG    Rt Ankle Dorsiflexion MMT, Gross Movement (4/5) good  -KG       MMT Left Lower Ext    Lt Hip Flexion MMT, Gross Movement (5/5) normal  -KG    Lt Hip ABduction MMT, Gross Movement (4+/5) good plus  -KG    Lt Hip ADduction MMT, Gross Movement (4+/5) good plus  -KG    Lt Knee Extension MMT, Gross Movement (5/5) normal  -KG    Lt Knee Flexion MMT, Gross Movement (5/5) normal  -KG    Lt Ankle Plantarflexion MMT, Gross Movement (5/5) normal  -KG    Lt Ankle Dorsiflexion MMT, Gross Movement (5/5) normal  -KG       Sensation    Sensation WNL? WFL  -KG    Light Touch No apparent deficits  -KG       Flexibility    Flexibility Tested? Lower Extremity  -KG       Lower Extremity Flexibility    Hamstrings Bilateral:;Moderately limited  -KG    Hip Flexors Mildly limited  -KG    Hip External Rotators Bilateral:;Mildly limited  R>L  -KG    Hip Internal Rotators Bilateral:;Mildly limited  R>L  -KG       Transfers    Comment, (Transfers) Cues for log roll for sup<>sit transfer  -KG       Gait/Stairs (Locomotion)    Comment, (Gait/Stairs) Ambulates with no AD. Decreased camilo. Slightly decreased stance time at RLE. Forward flexed at hips.  -KG          User Key  (r) = Recorded By, (t) = Taken By, (c) = Cosigned By    Initials Name Provider Type    Nahomy Boudreaux, PT Physical Therapist                            Therapy Education  Education Details: HEP: see exercise flowsheet for details  Given: Symptoms/condition management, HEP, Pain management, Posture/body mechanics  Program: New  How Provided: Verbal, Demonstration, Written  Provided to: Patient  Level of Understanding: Teach back  education performed, Verbalized, Demonstrated         PT OP Goals     Row Name 04/25/22 0900          PT Short Term Goals    STG Date to Achieve 05/16/22  -KG     STG 1 Demonstrate independence/compliance in performance of initial HEP  -KG     STG 2 Demonstrate improved seated PN/postural positioning with seated core stabilization activities with minimal cues required to correct  -KG     STG 3 Demonstrate independence in isometric TA contraction in various positions for functional carryover into daily activity performance  -KG     STG 4 Demonstrate improved lumbar flex AROM fingertips to mid shin without increased pain  -KG            Long Term Goals    LTG Date to Achieve 06/06/22  -KG     LTG 1 Improve modified oswestry score 30% to or less  -KG     LTG 2 Subjectively report 50% improvement or greater in RLE radicular symptoms with housework and daily activities  -KG     LTG 3 Tolerate 15-20 minutes or greater of standing stabilization/strengthening activities without increased low back/RLE pain & symptoms  -KG     LTG 4 Demonstrate improved R ankle DF MMT to 4+/5 or greater  -KG     LTG 5 Demonstrate improved R knee flex/ext MMT to 5/5  -KG     LTG 6 Demonstrate proper lifting/body mechanics when lifting lightly weighted crate from ~8” stool <> waist without increased pain  -KG            Time Calculation    PT Goal Re-Cert Due Date 05/16/22  -KG           User Key  (r) = Recorded By, (t) = Taken By, (c) = Cosigned By    Initials Name Provider Type    KG Nahomy Huynh, PT Physical Therapist                 PT Assessment/Plan     Row Name 04/25/22 0900          PT Assessment    Functional Limitations Impaired gait;Limitation in home management;Limitations in community activities;Performance in leisure activities;Performance in self-care ADL  -KG     Impairments Gait;Impaired flexibility;Impaired muscle endurance;Joint mobility;Muscle strength;Pain;Poor body mechanics;Posture;Range of motion  -KG     Assessment  Comments Pt is a 76 y.o. female presenting with low back & RLE pain that is limiting performance of functional mobility and daily activities. Signs/symptoms most consistent with stenotic/arthritic type pain. Has increased pain/symptoms in low back/LE with standing and ext activities. Does present with some mild neurotension in RLE. Decreased pain/symptoms with BLE LAD and responded well with manual/soft tissue work. Does have some weakness in R knee and ankle DF on R. Deficits noted in core/postural stability as well. Pt needs work on LE/core flexibility, functional strength, dynamic stability, posture/ education, & functional activity tolerance & will benefit from skilled PT services.  -KG     Rehab Potential Good  -KG     Patient/caregiver participated in establishment of treatment plan and goals Yes  -KG     Patient would benefit from skilled therapy intervention Yes  -KG            PT Plan    PT Frequency 2x/week  -KG     Predicted Duration of Therapy Intervention (PT) 12 visits  -KG     Physical Therapy Interventions (Optional Details) balance training;gait training;home exercise program;joint mobilization;lumbar stabilization;manual therapy techniques;modalities;neuromuscular re-education;patient/family education;postural re-education;ROM (Range of Motion);strengthening;stretching  -KG     PT Plan Comments Work on core/postural stability. Teach iso TA next visit. Continue core/LE stretching. Continue flexion based activities. Continue manual/soft tissue work. RLE nerve glides. Has cane but doesn't use; might work on gait with cane to use on days when having more pain/weakness in RLE  -KG           User Key  (r) = Recorded By, (t) = Taken By, (c) = Cosigned By    Initials Name Provider Type    KG Nahomy Huynh PT Physical Therapist                   OP Exercises     Row Name 04/25/22 0900             Subjective Comments    Subjective Comments Refer to therapy pt history for details  -KG         "      Subjective Pain    Able to rate subjective pain? yes  -KG      Pre-Treatment Pain Level 5  -KG              Exercise 1    Exercise Name 1 SKTC stretch bilateral  -KG      Cueing 1 Verbal  -KG      Reps 1 2  -KG      Time 1 30\" hold  -KG              Exercise 2    Exercise Name 2 Supine piriformis stretch thomas  -KG      Cueing 2 Verbal  -KG      Reps 2 2  -KG      Time 2 30\" hold  -KG      Additional Comments no crossing  -KG              Exercise 3    Exercise Name 3 Seated thomas hamstring stretch  -KG      Cueing 3 Verbal  -KG      Reps 3 2  -KG      Time 3 30\" hold  -KG              Exercise 4    Exercise Name 4 LE seated sciatic nerve glides with AP's  -KG      Cueing 4 Verbal  -KG      Sets 4 2  -KG      Reps 4 10  -KG      Time 4 AP's  -KG            User Key  (r) = Recorded By, (t) = Taken By, (c) = Cosigned By    Initials Name Provider Type    Nahomy Boudreaux, PT Physical Therapist                    Manual Rx (last 36 hours)     Manual Treatments     Row Name 04/25/22 0900             Manual Rx 1    Manual Rx 1 Location BLE LAD for pain/symptom relief  -KG              Manual Rx 2    Manual Rx 2 Location LLE/QL  -KG      Manual Rx 2 Type LAD  -KG              Manual Rx 3    Manual Rx 3 Location L sidelying: R>L lumbar parapsinals, R posterior hip musculature, lateral quad  -KG      Manual Rx 3 Type STM/MFR  -KG            User Key  (r) = Recorded By, (t) = Taken By, (c) = Cosigned By    Initials Name Provider Type    Nahomy Boudreaux, PT Physical Therapist                      Outcome Measure Options: Lower Extremity Functional Scale (LEFS), Modified Oswestry  Lower Extremity Functional Index  Any of your usual work, housework or school activities: A little bit of difficulty  Your usual hobbies, recreational or sporting activities: A little bit of difficulty  Getting into or out of the bath: A little bit of difficulty  Walking between rooms: A little bit of difficulty  Putting on your shoes or " "socks: A little bit of difficulty  Squatting: A little bit of difficulty  Lifting an object, like a bag of groceries from the floor: Moderate difficulty  Performing light activities around your home: A little bit of difficulty  Performing heavy activities around your home: Moderate difficulty  Getting into or out of a car: A little bit of difficulty  Walking 2 blocks: Moderate difficulty  Walking a mile: Quite a bit of difficulty  Going up or down 10 stairs (about 1 flight of stairs): Moderate difficulty  Standing for 1 hour: Quite a bit of difficulty  Sitting for 1 hour: Moderate difficulty  Running on even ground: Extreme difficulty or unable to perform activity  Running on uneven ground: Extreme difficulty or unable to perform activity  Making sharp turns while running fast: Extreme difficulty or unable to perform activity  Hopping: Moderate difficulty  Rolling over in bed: A little bit of difficulty  Total: 41  Modified Oswestry  Modified Oswestry Score/Comments: 19 = 38%; \"some days better than others\"      Time Calculation:     Start Time: 0936  Stop Time: 1020  Time Calculation (min): 44 min  Total Timed Code Minutes- PT: 25 minute(s)     Therapy Charges for Today     Code Description Service Date Service Provider Modifiers Qty    51034850582 HC PT EVAL LOW COMPLEXITY 1 4/25/2022 Nahomy Huynh, PT GP 1    48041003975 HC PT MANUAL THERAPY EA 15 MIN 4/25/2022 Nahomy Huynh, PT GP 1    92316304922 HC PT THER PROC EA 15 MIN 4/25/2022 Nahomy Huynh, PT GP 1          PT G-Codes  Outcome Measure Options: Lower Extremity Functional Scale (LEFS), Modified Oswestry  Total: 41  Modified Oswestry Score/Comments: 19 = 38%; \"some days better than others\"         Nahomy Huynh PT  4/26/2022      "

## 2022-04-29 ENCOUNTER — HOSPITAL ENCOUNTER (OUTPATIENT)
Dept: PHYSICAL THERAPY | Facility: HOSPITAL | Age: 77
Setting detail: THERAPIES SERIES
Discharge: HOME | End: 2022-04-29

## 2022-04-29 DIAGNOSIS — G89.29 CHRONIC BILATERAL LOW BACK PAIN WITHOUT SCIATICA: Primary | ICD-10-CM

## 2022-04-29 DIAGNOSIS — M54.50 CHRONIC BILATERAL LOW BACK PAIN WITHOUT SCIATICA: Primary | ICD-10-CM

## 2022-04-29 PROCEDURE — 97140 MANUAL THERAPY 1/> REGIONS: CPT

## 2022-04-29 PROCEDURE — 97110 THERAPEUTIC EXERCISES: CPT

## 2022-04-29 NOTE — THERAPY TREATMENT NOTE
Outpatient Physical Therapy Ortho Treatment Note  [facility]     Patient Name: Benedict Gomez  : 1945  MRN: [id number]  Today's Date: 2022      Visit Date: 2022    Attendance: 2 visits  Subjective improvement: n/a  MD appt: PRN  Recheck due: 2022    Visit Dx:    ICD-10-CM ICD-9-CM   1. Chronic bilateral low back pain without sciatica  M54.50 724.2    G89.29 338.29       Patient Active Problem List   Diagnosis   • Dyspnea on exertion        Past Medical History:   Diagnosis Date   • Hypertension    • Spinal stenosis of lumbar region         Past Surgical History:   Procedure Laterality Date   • CHOLECYSTECTOMY     • ECTOPIC PREGNANCY SURGERY     • HYSTERECTOMY                          PT Assessment/Plan     Row Name 22          PT Assessment    Assessment Comments pt did well with treatment today. pt had no significant increase in pain. Had mild complaints of pain with bridging but kept them low and it helped. L IC was superior but imprved afgter LAD. pt had releif with B LAD.  -KM            PT Plan    PT Frequency 2x/week  -KM     PT Plan Comments Cont manual. Monitor alignment. Could try seated TA/PN education  -KM           User Key  (r) = Recorded By, (t) = Taken By, (c) = Cosigned By    Initials Name Provider Type    Melodie Adler PTA Physical Therapist Assistant                 Modalities     Row Name 22             Moist Heat    MH Applied Yes  -KM      Location lumbar  -KM      PT Moist Heat Minutes 10  -KM      MH S/P Rx Yes  -KM            User Key  (r) = Recorded By, (t) = Taken By, (c) = Cosigned By    Initials Name Provider Type    Melodie Adler PTA Physical Therapist Assistant               OP Exercises     Row Name 22             Subjective Comments    Subjective Comments pt states that she had a busy day yesterday and was not able to do her exercises but had done them everyday since IE.  -KM               Subjective Pain    Able to rate subjective pain? yes  -KM      Pre-Treatment Pain Level 5  -KM      Post-Treatment Pain Level 4  -KM              Exercise 1    Exercise Name 1 Seated HS S  -KM      Reps 1 2  -KM      Time 1 30 sec hold  -KM              Exercise 2    Exercise Name 2 Supine SKTC S  -KM      Reps 2 2  -KM      Time 2 30 sec hold  -KM              Exercise 3    Exercise Name 3 HL piriformis s  -KM      Reps 3 2  -KM      Time 3 30 sec hold  -KM      Additional Comments gentle  -KM              Exercise 4    Exercise Name 4 Supine sciatic nerve glide + AP  -KM      Sets 4 1  -KM      Reps 4 3  -KM      Time 4 10 AP ea  -KM              Exercise 5    Exercise Name 5 supine Isometric TA/kegal  -KM      Sets 5 1  -KM      Reps 5 10  -KM      Time 5 5 sec hold  -KM              Exercise 6    Exercise Name 6 HL hip add squeezes  -KM      Sets 6 1  -KM      Reps 6 15  -KM      Time 6 5 sec hold  -KM              Exercise 7    Exercise Name 7 HL hip abd  -KM      Sets 7 1  -KM      Reps 7 15  -KM      Additional Comments red  -KM              Exercise 8    Exercise Name 8 Bridges low gentle  -KM      Sets 8 1  -KM      Reps 8 10  -KM              Exercise 9    Exercise Name 9 Alignment check  -KM            User Key  (r) = Recorded By, (t) = Taken By, (c) = Cosigned By    Initials Name Provider Type    Melodie Adler PTA Physical Therapist Assistant                         Manual Rx (last 36 hours)     Manual Treatments     Row Name 04/29/22 0900             Manual Rx 1    Manual Rx 1 Location BLE LAD for pain/symptom relief  -KM              Manual Rx 2    Manual Rx 2 Location LLE/QL  -KM      Manual Rx 2 Type LAD  -KM              Manual Rx 3    Manual Rx 3 Location L sidelying: R>L lumbar parapsinals, R posterior hip musculature, lateral quad  -KM            User Key  (r) = Recorded By, (t) = Taken By, (c) = Cosigned By    Initials Name Provider Type    Melodie Adler PTA  Physical Therapist Assistant                 PT OP Goals     Row Name 04/29/22 0900          PT Short Term Goals    STG Date to Achieve 05/16/22  -KM     STG 1 Demonstrate independence/compliance in performance of initial HEP  -KM     STG 1 Progress Ongoing  -KM     STG 2 Demonstrate improved seated PN/postural positioning with seated core stabilization activities with minimal cues required to correct  -KM     STG 2 Progress Ongoing  -KM     STG 3 Demonstrate independence in isometric TA contraction in various positions for functional carryover into daily activity performance  -KM     STG 3 Progress Ongoing  -KM     STG 4 Demonstrate improved lumbar flex AROM fingertips to mid shin without increased pain  -KM     STG 4 Progress Ongoing  -KM            Long Term Goals    LTG Date to Achieve 06/06/22  -KM     LTG 1 Improve modified oswestry score 30% to or less  -KM     LTG 1 Progress Ongoing  -KM     LTG 2 Subjectively report 50% improvement or greater in RLE radicular symptoms with housework and daily activities  -KM     LTG 2 Progress Ongoing  -KM     LTG 3 Tolerate 15-20 minutes or greater of standing stabilization/strengthening activities without increased low back/RLE pain & symptoms  -KM     LTG 3 Progress Ongoing  -KM     LTG 4 Demonstrate improved R ankle DF MMT to 4+/5 or greater  -KM     LTG 4 Progress Ongoing  -KM     LTG 5 Demonstrate improved R knee flex/ext MMT to 5/5  -KM     LTG 5 Progress Ongoing  -KM     LTG 6 Demonstrate proper lifting/body mechanics when lifting lightly weighted crate from ~8” stool <> waist without increased pain  -KM     LTG 6 Progress Ongoing  -KM            Time Calculation    PT Goal Re-Cert Due Date 05/16/22  -KM           User Key  (r) = Recorded By, (t) = Taken By, (c) = Cosigned By    Initials Name Provider Type    Melodie Adler, PTA Physical Therapist Assistant                Therapy Education  Given: Symptoms/condition management, HEP, Pain management,  Posture/body mechanics  Program: New  How Provided: Verbal, Demonstration, Written  Provided to: Patient  Level of Understanding: Teach back education performed, Verbalized, Demonstrated              Time Calculation:   Start Time: 0935  Stop Time: 1025  Time Calculation (min): 50 min  Untimed Charges  PT Moist Heat Minutes: 10  Total Minutes  Untimed Charges Total Minutes: 10   Total Minutes: 10  Therapy Charges for Today     Code Description Service Date Service Provider Modifiers Qty    71526776628 HC PT THER PROC EA 15 MIN 4/29/2022 Melodie Boyd, PTA GP, CQ 2    25583038270 HC PT MANUAL THERAPY EA 15 MIN 4/29/2022 Melodie Boyd PTA GP, CQ 1                    Melodie Boyd PTA  4/29/2022

## 2022-05-02 ENCOUNTER — HOSPITAL ENCOUNTER (OUTPATIENT)
Dept: PHYSICAL THERAPY | Facility: HOSPITAL | Age: 77
Setting detail: THERAPIES SERIES
Discharge: HOME | End: 2022-05-02

## 2022-05-02 DIAGNOSIS — M54.50 CHRONIC BILATERAL LOW BACK PAIN WITHOUT SCIATICA: Primary | ICD-10-CM

## 2022-05-02 DIAGNOSIS — G89.29 CHRONIC BILATERAL LOW BACK PAIN WITHOUT SCIATICA: Primary | ICD-10-CM

## 2022-05-02 PROCEDURE — 97110 THERAPEUTIC EXERCISES: CPT

## 2022-05-02 PROCEDURE — 97140 MANUAL THERAPY 1/> REGIONS: CPT

## 2022-05-02 NOTE — THERAPY TREATMENT NOTE
Outpatient Physical Therapy Ortho Treatment Note  [facility]     Patient Name: Benedict Gomez  : 1945  MRN: [id number]  Today's Date: 2022      Visit Date: 2022    Attendance: 3 visits  Subjective improvement: n/a  MD appt: PRN  Recheck due: 2022    Visit Dx:    ICD-10-CM ICD-9-CM   1. Chronic bilateral low back pain without sciatica  M54.50 724.2    G89.29 338.29       Patient Active Problem List   Diagnosis   • Dyspnea on exertion        Past Medical History:   Diagnosis Date   • Hypertension    • Spinal stenosis of lumbar region         Past Surgical History:   Procedure Laterality Date   • CHOLECYSTECTOMY     • ECTOPIC PREGNANCY SURGERY     • HYSTERECTOMY          PT Ortho     Row Name 22       Precautions and Contraindications    Precautions/Limitations no known precautions/limitations  -KM       Subjective Pain    Able to rate subjective pain? yes  -KM    Pre-Treatment Pain Level 4  -KM       Posture/Observations    Posture/Observations Comments L IC slightly superior  -KM          User Key  (r) = Recorded By, (t) = Taken By, (c) = Cosigned By    Initials Name Provider Type    Melodie Adler PTA Physical Therapist Assistant                             PT Assessment/Plan     Row Name 22 08          PT Assessment    Assessment Comments pt continues with malalignment, that does correct with LAD. Educated pt on seated core stab today and pt was able to display fair PN with light assist from UEs. Continues with increase pain/stretch with piriformis S on RLE- educated on more gentle S.  -KM            PT Plan    PT Frequency 2x/week  -KM     PT Plan Comments Cont seated core stab. Try iso add + bridges next visit. Monitor alignment  -KM           User Key  (r) = Recorded By, (t) = Taken By, (c) = Cosigned By    Initials Name Provider Type    Melodie Adler PTA Physical Therapist Assistant                 Modalities     Row  "Name 05/02/22 0800             Moist Heat    Patient denies application of MH Yes  -KM            User Key  (r) = Recorded By, (t) = Taken By, (c) = Cosigned By    Initials Name Provider Type    Melodie Adler PTA Physical Therapist Assistant               OP Exercises     Row Name 05/02/22 0800             Subjective Comments    Subjective Comments pt states that she had a bad day on Saturday. States her joints hurt.  -KM              Subjective Pain    Able to rate subjective pain? yes  -KM      Pre-Treatment Pain Level 4  -KM      Post-Treatment Pain Level --  \"better\"  -KM              Exercise 1    Exercise Name 1 Seated HS S  -KM      Reps 1 2  -KM      Time 1 30 sec hold  -KM              Exercise 2    Exercise Name 2 Seated sciatic nerve glide  -KM      Sets 2 1  -KM      Reps 2 3 (10 AP ea)  -KM              Exercise 3    Exercise Name 3 Seated TA/PN + education  -KM              Exercise 4    Exercise Name 4 Seated TA/PN + LAQ  -KM      Sets 4 2  -KM      Reps 4 10  -KM              Exercise 5    Exercise Name 5 Seated TA/PN + march  -KM      Sets 5 2  -KM      Reps 5 10  -KM              Exercise 6    Exercise Name 6 Supine SKTC S  -KM      Reps 6 2  -KM      Time 6 30 sec hold  -KM              Exercise 7    Exercise Name 7 HL piriformis S  -KM      Reps 7 2  -KM      Time 7 30 sec hold  -KM              Exercise 8    Exercise Name 8 Supine iso TA/kegal review  -KM      Sets 8 1  -KM      Reps 8 15  -KM              Exercise 9    Exercise Name 9 HL hip abd + TA  -KM      Sets 9 2  -KM      Reps 9 10  -KM      Additional Comments red tband  -KM              Exercise 10    Exercise Name 10 Bridges gentle low  -KM      Sets 10 1  -KM      Reps 10 5  -KM            User Key  (r) = Recorded By, (t) = Taken By, (c) = Cosigned By    Initials Name Provider Type    Melodie Adler PTA Physical Therapist Assistant                         Manual Rx (last 36 hours)     Manual Treatments     " Row Name 05/02/22 0700             Manual Rx 1    Manual Rx 1 Location BLE LAD for pain/symptom relief  -KM              Manual Rx 2    Manual Rx 2 Location LLE/QL  -KM      Manual Rx 2 Type LAD  -KM              Manual Rx 3    Manual Rx 3 Location L sidelying: R>L lumbar parapsinals, R posterior hip musculature, lateral quad  -KM            User Key  (r) = Recorded By, (t) = Taken By, (c) = Cosigned By    Initials Name Provider Type    Melodie Adler, PTA Physical Therapist Assistant                 PT OP Goals     Row Name 05/02/22 0800          PT Short Term Goals    STG Date to Achieve 05/16/22  -KM     STG 1 Demonstrate independence/compliance in performance of initial HEP  -KM     STG 1 Progress Ongoing  -KM     STG 2 Demonstrate improved seated PN/postural positioning with seated core stabilization activities with minimal cues required to correct  -KM     STG 2 Progress Ongoing  -KM     STG 3 Demonstrate independence in isometric TA contraction in various positions for functional carryover into daily activity performance  -KM     STG 3 Progress Ongoing  -KM     STG 4 Demonstrate improved lumbar flex AROM fingertips to mid shin without increased pain  -KM     STG 4 Progress Ongoing  -KM            Long Term Goals    LTG Date to Achieve 06/06/22  -KM     LTG 1 Improve modified oswestry score 30% to or less  -KM     LTG 1 Progress Ongoing  -KM     LTG 2 Subjectively report 50% improvement or greater in RLE radicular symptoms with housework and daily activities  -KM     LTG 2 Progress Ongoing  -KM     LTG 3 Tolerate 15-20 minutes or greater of standing stabilization/strengthening activities without increased low back/RLE pain & symptoms  -KM     LTG 3 Progress Ongoing  -KM     LTG 4 Demonstrate improved R ankle DF MMT to 4+/5 or greater  -KM     LTG 4 Progress Ongoing  -KM     LTG 5 Demonstrate improved R knee flex/ext MMT to 5/5  -KM     LTG 5 Progress Ongoing  -KM     LTG 6 Demonstrate proper  lifting/body mechanics when lifting lightly weighted crate from ~8” stool <> waist without increased pain  -KM     LTG 6 Progress Ongoing  -KM            Time Calculation    PT Goal Re-Cert Due Date 05/16/22  -KM           User Key  (r) = Recorded By, (t) = Taken By, (c) = Cosigned By    Initials Name Provider Type    Melodie Adler PTA Physical Therapist Assistant                Therapy Education  Given: Symptoms/condition management, HEP, Pain management, Posture/body mechanics  Program: New  How Provided: Verbal, Demonstration, Written  Provided to: Patient  Level of Understanding: Teach back education performed, Verbalized, Demonstrated              Time Calculation:   Start Time: 0801  Stop Time: 0845  Time Calculation (min): 44 min  Therapy Charges for Today     Code Description Service Date Service Provider Modifiers Qty    80030184073 HC PT THER PROC EA 15 MIN 5/2/2022 Melodie Fine PTA GP, CQ 2    84251878884 HC PT MANUAL THERAPY EA 15 MIN 5/2/2022 Melodie Fine, MADELAINE GP, CQ 1                    Melodie Fine PTA  5/2/2022

## 2022-05-04 ENCOUNTER — HOSPITAL ENCOUNTER (OUTPATIENT)
Dept: PHYSICAL THERAPY | Facility: HOSPITAL | Age: 77
Setting detail: THERAPIES SERIES
Discharge: HOME | End: 2022-05-04

## 2022-05-04 DIAGNOSIS — G89.29 CHRONIC BILATERAL LOW BACK PAIN WITHOUT SCIATICA: Primary | ICD-10-CM

## 2022-05-04 DIAGNOSIS — M54.50 CHRONIC BILATERAL LOW BACK PAIN WITHOUT SCIATICA: Primary | ICD-10-CM

## 2022-05-04 PROCEDURE — 97140 MANUAL THERAPY 1/> REGIONS: CPT

## 2022-05-04 PROCEDURE — 97110 THERAPEUTIC EXERCISES: CPT

## 2022-05-04 NOTE — THERAPY TREATMENT NOTE
Outpatient Physical Therapy Ortho Treatment Note  [facility]       Patient Name: Benedict Gomez  : 1945  MRN: [id number]  Today's Date: 2022      Visit Date: 2022    Attendance: 4 visits  Subjective improvement: n/a  MD appt: PRN  Recheck due: 22    Visit Dx:    ICD-10-CM ICD-9-CM   1. Chronic bilateral low back pain without sciatica  M54.50 724.2    G89.29 338.29       Patient Active Problem List   Diagnosis   • Dyspnea on exertion        Past Medical History:   Diagnosis Date   • Hypertension    • Spinal stenosis of lumbar region         Past Surgical History:   Procedure Laterality Date   • CHOLECYSTECTOMY     • ECTOPIC PREGNANCY SURGERY     • HYSTERECTOMY          PT Ortho     Row Name 22       Precautions and Contraindications    Precautions/Limitations no known precautions/limitations  -KM       Subjective Pain    Able to rate subjective pain? yes  -KM    Pre-Treatment Pain Level 0  -KM    Post-Treatment Pain Level 0  -KM       Posture/Observations    Posture/Observations Comments L IC superior to R.  -KM          User Key  (r) = Recorded By, (t) = Taken By, (c) = Cosigned By    Initials Name Provider Type    Melodie Adler PTA Physical Therapist Assistant                             PT Assessment/Plan     Row Name 22          PT Assessment    Assessment Comments pt has increase TTP to R piriformis with manual. pt has good response to manual and MHP. pt showed good form with new SL clamshells and was able to do low level bridge with only minor complaints of discomfort. L IC was slightly superior that corrected easily with LAD.  -KM            PT Plan    PT Frequency 2x/week  -KM     PT Plan Comments Cont to monitor alignment. Consider trying gentle standing core stab next visit.  -KM           User Key  (r) = Recorded By, (t) = Taken By, (c) = Cosigned By    Initials Name Provider Type    Melodie dAler PTA Physical  Therapist Assistant                 Modalities     Row Name 05/04/22 0800             Moist Heat    MH Applied Yes  -KM      Location lumbar  -KM      PT Moist Heat Minutes 10  -KM      MH S/P Rx Yes  -KM            User Key  (r) = Recorded By, (t) = Taken By, (c) = Cosigned By    Initials Name Provider Type    Melodie Adler PTA Physical Therapist Assistant               OP Exercises     Row Name 05/04/22 0800             Subjective Comments    Subjective Comments pt states that she is not having any pain currently. States she feels that therapy might be helping some.  -KM              Subjective Pain    Able to rate subjective pain? yes  -KM      Pre-Treatment Pain Level 0  -KM      Post-Treatment Pain Level 0  -KM              Exercise 1    Exercise Name 1 Seated HS S  -KM      Reps 1 2  -KM      Time 1 30 sec hold  -KM              Exercise 2    Exercise Name 2 Seated sciatic nerve glide  -KM      Sets 2 1  -KM      Reps 2 3 (10 AP ea)  -KM              Exercise 3    Exercise Name 3 Seated figure 4 S  -KM      Reps 3 2  -KM      Time 3 30 sec hold  -KM              Exercise 4    Exercise Name 4 Seated TA/PN + LAQ  -KM      Sets 4 2  -KM      Reps 4 10  -KM              Exercise 5    Exercise Name 5 Seated TA/PN + march  -KM      Sets 5 2  -KM      Reps 5 10  -KM              Exercise 6    Exercise Name 6 Supine SKTC S  -KM      Reps 6 2  -KM      Time 6 30 sec hold  -KM              Exercise 7    Exercise Name 7 HL piriformis S  -KM      Reps 7 2  -KM      Time 7 30 sec hold  -KM              Exercise 8    Exercise Name 8 iso TA verb review  -KM              Exercise 9    Exercise Name 9 SL clamshells  -KM      Sets 9 1  -KM      Reps 9 15 ea LE  -KM              Exercise 10    Exercise Name 10 Bridges + iso add squeeze  -KM      Sets 10 1  -KM      Reps 10 10  -KM      Time 10 5 sec hold  -KM            User Key  (r) = Recorded By, (t) = Taken By, (c) = Cosigned By    Initials Name Provider Type     KM Melodie Fine PTA Physical Therapist Assistant                         Manual Rx (last 36 hours)     Manual Treatments     Row Name 05/04/22 0700             Manual Rx 1    Manual Rx 1 Location BLE LAD for pain/symptom relief  -KM              Manual Rx 2    Manual Rx 2 Location LLE/QL  -KM      Manual Rx 2 Type LAD  -KM              Manual Rx 3    Manual Rx 3 Location L sidelying: R>L lumbar parapsinals, R posterior hip musculature, lateral quad  -KM            User Key  (r) = Recorded By, (t) = Taken By, (c) = Cosigned By    Initials Name Provider Type    Melodie Adler PTA Physical Therapist Assistant                 PT OP Goals     Row Name 05/04/22 0800          PT Short Term Goals    STG Date to Achieve 05/16/22  -KM     STG 1 Demonstrate independence/compliance in performance of initial HEP  -KM     STG 1 Progress Ongoing  -KM     STG 2 Demonstrate improved seated PN/postural positioning with seated core stabilization activities with minimal cues required to correct  -KM     STG 2 Progress Ongoing  -KM     STG 3 Demonstrate independence in isometric TA contraction in various positions for functional carryover into daily activity performance  -KM     STG 3 Progress Ongoing  -KM     STG 4 Demonstrate improved lumbar flex AROM fingertips to mid shin without increased pain  -KM     STG 4 Progress Ongoing  -KM            Long Term Goals    LTG Date to Achieve 06/06/22  -KM     LTG 1 Improve modified oswestry score 30% to or less  -KM     LTG 1 Progress Ongoing  -KM     LTG 2 Subjectively report 50% improvement or greater in RLE radicular symptoms with housework and daily activities  -KM     LTG 2 Progress Ongoing  -KM     LTG 3 Tolerate 15-20 minutes or greater of standing stabilization/strengthening activities without increased low back/RLE pain & symptoms  -KM     LTG 3 Progress Ongoing  -KM     LTG 4 Demonstrate improved R ankle DF MMT to 4+/5 or greater  -KM     LTG 4 Progress Ongoing   -KM     LTG 5 Demonstrate improved R knee flex/ext MMT to 5/5  -KM     LTG 5 Progress Ongoing  -KM     LTG 6 Demonstrate proper lifting/body mechanics when lifting lightly weighted crate from ~8” stool <> waist without increased pain  -KM     LTG 6 Progress Ongoing  -KM            Time Calculation    PT Goal Re-Cert Due Date 05/16/22  -KM           User Key  (r) = Recorded By, (t) = Taken By, (c) = Cosigned By    Initials Name Provider Type     Melodie Boyd PTA Physical Therapist Assistant                Therapy Education  Given: Symptoms/condition management, HEP, Pain management, Posture/body mechanics  Program: New  How Provided: Verbal, Demonstration, Written  Provided to: Patient  Level of Understanding: Teach back education performed, Verbalized, Demonstrated              Time Calculation:   Start Time: 0802  Stop Time: 0856  Time Calculation (min): 54 min  Untimed Charges  PT Moist Heat Minutes: 10  Total Minutes  Untimed Charges Total Minutes: 10   Total Minutes: 10  Therapy Charges for Today     Code Description Service Date Service Provider Modifiers Qty    83823117108 HC PT THER PROC EA 15 MIN 5/4/2022 Melodie Boyd PTA GP, CQ 2    78121852354 HC PT MANUAL THERAPY EA 15 MIN 5/4/2022 Melodie Boyd PTA GP, CQ 1                    Melodie Boyd PTA  5/4/2022

## 2022-05-09 ENCOUNTER — APPOINTMENT (OUTPATIENT)
Dept: PHYSICAL THERAPY | Facility: HOSPITAL | Age: 77
End: 2022-05-09

## 2022-05-10 ENCOUNTER — HOSPITAL ENCOUNTER (OUTPATIENT)
Dept: PHYSICAL THERAPY | Facility: HOSPITAL | Age: 77
Setting detail: THERAPIES SERIES
Discharge: HOME | End: 2022-05-10

## 2022-05-10 DIAGNOSIS — G89.29 CHRONIC BILATERAL LOW BACK PAIN WITHOUT SCIATICA: Primary | ICD-10-CM

## 2022-05-10 DIAGNOSIS — M54.50 CHRONIC BILATERAL LOW BACK PAIN WITHOUT SCIATICA: Primary | ICD-10-CM

## 2022-05-10 PROCEDURE — 97110 THERAPEUTIC EXERCISES: CPT | Performed by: PHYSICAL THERAPIST

## 2022-05-10 PROCEDURE — 97140 MANUAL THERAPY 1/> REGIONS: CPT | Performed by: PHYSICAL THERAPIST

## 2022-05-10 NOTE — THERAPY TREATMENT NOTE
"    Outpatient Physical Therapy Ortho Treatment Note  [facility]     Patient Name: Benedict Gomez  : 1945  MRN: [id number]  Today's Date: 5/10/2022      Visit Date: 05/10/2022     Attendance: 5 visits  Subjective improvement: \"starting to get better\"  MD appt: PRN  Recheck due: 22    Visit Dx:    ICD-10-CM ICD-9-CM   1. Chronic bilateral low back pain without sciatica  M54.50 724.2    G89.29 338.29       Patient Active Problem List   Diagnosis   • Dyspnea on exertion        Past Medical History:   Diagnosis Date   • Hypertension    • Spinal stenosis of lumbar region         Past Surgical History:   Procedure Laterality Date   • CHOLECYSTECTOMY     • ECTOPIC PREGNANCY SURGERY     • HYSTERECTOMY          PT Ortho     Row Name 05/10/22 0800       Precautions and Contraindications    Precautions/Limitations no known precautions/limitations  -KG       Posture/Observations    Posture/Observations Comments Intermittent postural cues, booker with standing. Supine L IC superior vs R. Improved with LAD  -KG          User Key  (r) = Recorded By, (t) = Taken By, (c) = Cosigned By    Initials Name Provider Type    Nahomy Boudreaux, PT Physical Therapist                             PT Assessment/Plan     Row Name 05/10/22 0800          PT Assessment    Assessment Comments Pt did well with progressions to standing stability without increased pain. Doing better with maintaining PN positioning with seated core stabilization. Does fatigue with strengthening activities. Continues to respond well to manual interventions  -KG            PT Plan    PT Frequency 2x/week  -KG     PT Plan Comments Continue standing stability progressions. Could try progressing to dynadisc for seated core. Update HEP  -KG           User Key  (r) = Recorded By, (t) = Taken By, (c) = Cosigned By    Initials Name Provider Type    Nahomy Boudreaux, PT Physical Therapist                 Modalities     Row Name 05/10/22 0800 "             Moist Heat    MH Applied Yes  -KG      Location L sidelying: Lumbar spine/R post hip  -KG      PT Moist Heat Minutes 10  -KG      MH S/P Rx Yes  -KG            User Key  (r) = Recorded By, (t) = Taken By, (c) = Cosigned By    Initials Name Provider Type    KG Lino Nahomy ZEFERINO, PT Physical Therapist               OP Exercises     Row Name 05/10/22 0800             Subjective Comments    Subjective Comments Pt states she feels like therapy is helping. Pain getting better. Has had a good past couple of days.  -KG              Subjective Pain    Able to rate subjective pain? yes  -KG      Pre-Treatment Pain Level 1  -KG      Post-Treatment Pain Level 0  -KG              Exercise 1    Exercise Name 1 Seated HS S  -KG      Reps 1 2  -KG      Time 1 30 sec hold  -KG              Exercise 2    Exercise Name 2 Seated sciatic nerve glide  -KG      Sets 2 1  -KG      Reps 2 1 for review (10 AP ea)  -KG              Exercise 3    Exercise Name 3 Seated figure 4 S  -KG      Reps 3 2  -KG      Time 3 30 sec hold  -KG              Exercise 4    Exercise Name 4 Standing alt marching with TA  -KG      Cueing 4 Verbal  -KG      Sets 4 1  -KG      Reps 4 15  -KG              Exercise 5    Exercise Name 5 Standing alt hip abd SLR with tA  -KG      Cueing 5 Verbal  -KG      Sets 5 1  -KG      Reps 5 15  -KG              Exercise 6    Exercise Name 6 Standing alt hip ext SLR with TA  -KG      Cueing 6 Verbal  -KG      Sets 6 1  -KG      Reps 6 10  -KG              Exercise 7    Exercise Name 7 Seated PN/TA alt LAQ  -KG      Cueing 7 Verbal  -KG      Sets 7 2  -KG      Reps 7 10  -KG              Exercise 8    Exercise Name 8 Seated PN/TA alt marching  -KG      Cueing 8 Verbal  -KG      Sets 8 2  -KG      Reps 8 10  -KG              Exercise 9    Exercise Name 9 Sit <> stands with TA  -KG      Cueing 9 Verbal  -KG      Sets 9 1  -KG      Reps 9 10  -KG              Exercise 10    Exercise Name 10 Supine SKTC S  -KG      Reps  "10 1 ea  -KG      Time 10 30\" hold  -KG              Exercise 11    Exercise Name 11 HL piriformis stretch  -KG      Cueing 11 Verbal  -KG      Reps 11 1 ea  -KG      Time 11 30\" hold  -KG              Exercise 12    Exercise Name 12 Bridges + iso add squeeze  -KG      Cueing 12 Verbal  -KG      Reps 12 1x10  -KG      Time 12 5\" hold  -KG            User Key  (r) = Recorded By, (t) = Taken By, (c) = Cosigned By    Initials Name Provider Type    LAURENT Nahomy Huynh, PT Physical Therapist                         Manual Rx (last 36 hours)     Manual Treatments     Row Name 05/10/22 0800             Manual Rx 1    Manual Rx 1 Location BLE LAD for pain/symptom relief  -KG              Manual Rx 2    Manual Rx 2 Location LLE/QL  -KG      Manual Rx 2 Type LAD  -KG              Manual Rx 3    Manual Rx 3 Location L sidelying: R>L lumbar parapsinals, R posterior hip musculature/piriformis, lateral quad  -KG            User Key  (r) = Recorded By, (t) = Taken By, (c) = Cosigned By    Initials Name Provider Type    LAURENT Nahomy Huynh, PT Physical Therapist                 PT OP Goals     Row Name 05/10/22 0800          PT Short Term Goals    STG Date to Achieve 05/16/22  -KG     STG 1 Demonstrate independence/compliance in performance of initial HEP  -KG     STG 1 Progress Met;Ongoing  -KG     STG 2 Demonstrate improved seated PN/postural positioning with seated core stabilization activities with minimal cues required to correct  -KG     STG 2 Progress Partially Met;Progressing  -KG     STG 3 Demonstrate independence in isometric TA contraction in various positions for functional carryover into daily activity performance  -KG     STG 3 Progress Ongoing  -KG     STG 4 Demonstrate improved lumbar flex AROM fingertips to mid shin without increased pain  -KG     STG 4 Progress Ongoing  -KG            Long Term Goals    LTG Date to Achieve 06/06/22  -KG     LTG 1 Improve modified oswestry score 30% to or less  -KG     LTG 1 " Progress Ongoing  -KG     LTG 2 Subjectively report 50% improvement or greater in RLE radicular symptoms with housework and daily activities  -KG     LTG 2 Progress Ongoing  -KG     LTG 3 Tolerate 15-20 minutes or greater of standing stabilization/strengthening activities without increased low back/RLE pain & symptoms  -KG     LTG 3 Progress Ongoing  -KG     LTG 4 Demonstrate improved R ankle DF MMT to 4+/5 or greater  -KG     LTG 4 Progress Ongoing  -KG     LTG 5 Demonstrate improved R knee flex/ext MMT to 5/5  -KG     LTG 5 Progress Ongoing  -KG     LTG 6 Demonstrate proper lifting/body mechanics when lifting lightly weighted crate from ~8” stool <> waist without increased pain  -KG     LTG 6 Progress Ongoing  -KG            Time Calculation    PT Goal Re-Cert Due Date 05/16/22  -KG           User Key  (r) = Recorded By, (t) = Taken By, (c) = Cosigned By    Initials Name Provider Type    KG Nahomy Huynh, PT Physical Therapist                Therapy Education  Given: Symptoms/condition management, HEP, Pain management, Posture/body mechanics  Program: Reinforced  How Provided: Verbal, Demonstration, Written  Provided to: Patient  Level of Understanding: Teach back education performed, Verbalized, Demonstrated              Time Calculation:   Start Time: 0851  Stop Time: 0947  Time Calculation (min): 56 min  Untimed Charges  PT Moist Heat Minutes: 10  Total Minutes  Untimed Charges Total Minutes: 10   Total Minutes: 10  Therapy Charges for Today     Code Description Service Date Service Provider Modifiers Qty    49908276054 HC PT THER PROC EA 15 MIN 5/10/2022 Nahomy Huynh, PT GP 2    38267456114 HC PT MANUAL THERAPY EA 15 MIN 5/10/2022 Nahomy Huynh, PT GP 1                    Nahomy Huynh PT  5/10/2022

## 2022-05-11 ENCOUNTER — HOSPITAL ENCOUNTER (OUTPATIENT)
Dept: PHYSICAL THERAPY | Facility: HOSPITAL | Age: 77
Setting detail: THERAPIES SERIES
Discharge: HOME | End: 2022-05-11

## 2022-05-11 DIAGNOSIS — G89.29 CHRONIC BILATERAL LOW BACK PAIN WITHOUT SCIATICA: Primary | ICD-10-CM

## 2022-05-11 DIAGNOSIS — M54.50 CHRONIC BILATERAL LOW BACK PAIN WITHOUT SCIATICA: Primary | ICD-10-CM

## 2022-05-11 PROCEDURE — 97140 MANUAL THERAPY 1/> REGIONS: CPT

## 2022-05-11 PROCEDURE — 97110 THERAPEUTIC EXERCISES: CPT

## 2022-05-11 NOTE — THERAPY TREATMENT NOTE
"    Outpatient Physical Therapy Ortho Treatment Note  [facility]     Patient Name: Benedict Gomez  : 1945  MRN: [id number]  Today's Date: 2022      Visit Date: 2022    Attendance: 6 visits  Subjective improvement: \"starting to get better\"  MD appt: PRN  Recheck due:     Visit Dx:    ICD-10-CM ICD-9-CM   1. Chronic bilateral low back pain without sciatica  M54.50 724.2    G89.29 338.29       Patient Active Problem List   Diagnosis   • Dyspnea on exertion        Past Medical History:   Diagnosis Date   • Hypertension    • Spinal stenosis of lumbar region         Past Surgical History:   Procedure Laterality Date   • CHOLECYSTECTOMY     • ECTOPIC PREGNANCY SURGERY     • HYSTERECTOMY          PT Ortho     Row Name 22       Precautions and Contraindications    Precautions/Limitations no known precautions/limitations  -KM       Subjective Pain    Post-Treatment Pain Level 2  -KM       Posture/Observations    Posture/Observations Comments L IC superior. Corrected after LAD  -KM          User Key  (r) = Recorded By, (t) = Taken By, (c) = Cosigned By    Initials Name Provider Type    Melodie Adler PTA Physical Therapist Assistant                             PT Assessment/Plan     Row Name 22          PT Assessment    Assessment Comments pt presents with higher pain levels today and deferred sitting exercises. pt continues with sore R post hip/piriformis and complained of pain across the low back. Post manual pt had decrease pain level and felt better.  -KM            PT Plan    PT Frequency 2x/week  -KM     PT Plan Comments Monitor pain level and try to resume seated core activities.  -KM           User Key  (r) = Recorded By, (t) = Taken By, (c) = Cosigned By    Initials Name Provider Type    Melodie Adler PTA Physical Therapist Assistant                 Modalities     Row Name 22 08             Moist Heat    Patient denies " application of MH Yes  -KM            User Key  (r) = Recorded By, (t) = Taken By, (c) = Cosigned By    Initials Name Provider Type    Melodie Adler PTA Physical Therapist Assistant               OP Exercises     Row Name 05/11/22 0800             Subjective Comments    Subjective Comments pt states that she had a hard day yesterday.  -KM              Subjective Pain    Able to rate subjective pain? yes  -KM      Pre-Treatment Pain Level 8  -KM      Post-Treatment Pain Level 2  -KM              Exercise 1    Exercise Name 1 Seated HS S  -KM      Reps 1 2  -KM      Time 1 30 sec hold  -KM              Exercise 2    Exercise Name 2 Seated sciatic nerve glide  -KM      Sets 2 1  -KM      Reps 2 3 (10 AP ea)  -KM              Exercise 3    Exercise Name 3 Seated figure 4 S  -KM      Reps 3 2  -KM      Time 3 30 sec hold  -KM              Exercise 4    Exercise Name 4 Standing alt march  -KM      Sets 4 1  -KM      Reps 4 10  -KM              Exercise 5    Exercise Name 5 Standing alt hip abd + TA  -KM      Sets 5 1  -KM      Reps 5 10  -KM              Exercise 6    Exercise Name 6 Standing alt hip ext + TA  -KM      Sets 6 1  -KM      Reps 6 10  -KM              Exercise 7    Exercise Name 7 Supine SKTC S  -KM      Reps 7 2  -KM      Time 7 30 sec hold  -KM              Exercise 8    Exercise Name 8 HL piriformis S  -KM      Reps 8 2  -KM      Time 8 30 sec hold  -KM              Exercise 9    Exercise Name 9 HL TA alt march  -KM      Sets 9 2  -KM      Reps 9 10  -KM              Exercise 10    Exercise Name 10 Bridges + iso add  -KM      Sets 10 2  -KM      Reps 10 10  -KM      Time 10 5 sec hold  -KM            User Key  (r) = Recorded By, (t) = Taken By, (c) = Cosigned By    Initials Name Provider Type    Melodie Adler PTA Physical Therapist Assistant                              PT OP Goals     Row Name 05/11/22 0800          PT Short Term Goals    STG Date to Achieve 05/16/22  -KM      STG 1 Demonstrate independence/compliance in performance of initial HEP  -KM     STG 1 Progress Met;Ongoing  -KM     STG 2 Demonstrate improved seated PN/postural positioning with seated core stabilization activities with minimal cues required to correct  -KM     STG 2 Progress Partially Met;Progressing  -KM     STG 3 Demonstrate independence in isometric TA contraction in various positions for functional carryover into daily activity performance  -KM     STG 3 Progress Ongoing  -KM     STG 4 Demonstrate improved lumbar flex AROM fingertips to mid shin without increased pain  -KM     STG 4 Progress Ongoing  -KM            Long Term Goals    LTG Date to Achieve 06/06/22  -KM     LTG 1 Improve modified oswestry score 30% to or less  -KM     LTG 1 Progress Ongoing  -KM     LTG 2 Subjectively report 50% improvement or greater in RLE radicular symptoms with housework and daily activities  -KM     LTG 2 Progress Ongoing  -KM     LTG 3 Tolerate 15-20 minutes or greater of standing stabilization/strengthening activities without increased low back/RLE pain & symptoms  -KM     LTG 3 Progress Ongoing  -KM     LTG 4 Demonstrate improved R ankle DF MMT to 4+/5 or greater  -KM     LTG 4 Progress Ongoing  -KM     LTG 5 Demonstrate improved R knee flex/ext MMT to 5/5  -KM     LTG 5 Progress Ongoing  -KM     LTG 6 Demonstrate proper lifting/body mechanics when lifting lightly weighted crate from ~8” stool <> waist without increased pain  -KM     LTG 6 Progress Ongoing  -KM            Time Calculation    PT Goal Re-Cert Due Date 05/16/22  -KM           User Key  (r) = Recorded By, (t) = Taken By, (c) = Cosigned By    Initials Name Provider Type    Melodie Adler, PTA Physical Therapist Assistant                Therapy Education  Given: Symptoms/condition management, HEP, Pain management, Posture/body mechanics  Program: Reinforced  How Provided: Verbal, Demonstration, Written  Provided to: Patient  Level of Understanding:  Teach back education performed, Verbalized, Demonstrated              Time Calculation:   Start Time: 0802  Stop Time: 0847  Time Calculation (min): 45 min  Therapy Charges for Today     Code Description Service Date Service Provider Modifiers Qty    89079616078 HC PT THER PROC EA 15 MIN 5/11/2022 Melodie Boyd, PTA GP, CQ 2    03104034432 HC PT MANUAL THERAPY EA 15 MIN 5/11/2022 Melodie Boyd, PTA GP, CQ 1                    Melodie Boyd PTA  5/11/2022

## 2022-05-16 ENCOUNTER — HOSPITAL ENCOUNTER (OUTPATIENT)
Dept: PHYSICAL THERAPY | Facility: HOSPITAL | Age: 77
Setting detail: THERAPIES SERIES
Discharge: HOME | End: 2022-05-16

## 2022-05-16 DIAGNOSIS — G89.29 CHRONIC BILATERAL LOW BACK PAIN WITHOUT SCIATICA: Primary | ICD-10-CM

## 2022-05-16 DIAGNOSIS — M54.50 CHRONIC BILATERAL LOW BACK PAIN WITHOUT SCIATICA: Primary | ICD-10-CM

## 2022-05-16 PROCEDURE — 97140 MANUAL THERAPY 1/> REGIONS: CPT

## 2022-05-16 PROCEDURE — 97110 THERAPEUTIC EXERCISES: CPT

## 2022-05-16 NOTE — THERAPY TREATMENT NOTE
"    Outpatient Physical Therapy Ortho Treatment Note  [facility]     Patient Name: Benedict Gomez  : 1945  MRN: [id number]  Today's Date: 2022      Visit Date: 2022    Attendance: 7 visits  Subjective improvement: \"starting to get better\"  MD appt: PRN  Recheck due: 22    Visit Dx:    ICD-10-CM ICD-9-CM   1. Chronic bilateral low back pain without sciatica  M54.50 724.2    G89.29 338.29       Patient Active Problem List   Diagnosis   • Dyspnea on exertion        Past Medical History:   Diagnosis Date   • Hypertension    • Spinal stenosis of lumbar region         Past Surgical History:   Procedure Laterality Date   • CHOLECYSTECTOMY     • ECTOPIC PREGNANCY SURGERY     • HYSTERECTOMY          PT Ortho     Row Name 22 08       Precautions and Contraindications    Precautions/Limitations no known precautions/limitations  -KM       Subjective Pain    Able to rate subjective pain? yes  -KM    Pre-Treatment Pain Level 3  -KM       Posture/Observations    Posture/Observations Comments ASIS and IC = today  -KM          User Key  (r) = Recorded By, (t) = Taken By, (c) = Cosigned By    Initials Name Provider Type    Melodie Adler PTA Physical Therapist Assistant                             PT Assessment/Plan     Row Name 22 0800          PT Assessment    Assessment Comments pt was able to resume previous therex today as she was not able to do last visit due to increase pain levels and was also able to increase reps some as well. No malalignment today. TTP to R post hip/lumbar. Did fairly well keeping PN on dynadsic and tolerating standing CKC core stab acitivites well. Updated HEP  -KM            PT Plan    PT Frequency 2x/week  -KM     PT Plan Comments Add airex to standing march. Possibly try airex beam side stepping next visit.  -KM           User Key  (r) = Recorded By, (t) = Taken By, (c) = Cosigned By    Initials Name Provider Type    MARCIA Fine" Melodie BURCH PTA Physical Therapist Assistant                 Modalities     Row Name 05/16/22 0800             Moist Heat    MH Applied Yes  -KM      Location L sidelying: Lumbar spine/R post hip  -KM      PT Moist Heat Minutes 10  -KM      MH S/P Rx Yes  -KM            User Key  (r) = Recorded By, (t) = Taken By, (c) = Cosigned By    Initials Name Provider Type    Melodie Adler PTA Physical Therapist Assistant               OP Exercises     Row Name 05/16/22 0800             Subjective Comments    Subjective Comments pt states that she is tired. States that she is feeling better today than last visit.  -KM              Subjective Pain    Able to rate subjective pain? yes  -KM      Pre-Treatment Pain Level 3  -KM      Post-Treatment Pain Level 3  -KM              Exercise 1    Exercise Name 1 Seated HS S  -KM      Reps 1 2  -KM      Time 1 30 sec hold  -KM              Exercise 2    Exercise Name 2 Seated sciatic nerve glide  -KM      Sets 2 1  -KM      Reps 2 3 (10 AP ea)  -KM              Exercise 3    Exercise Name 3 Seated figure 4 S  -KM      Reps 3 2  -KM      Time 3 30 sec hold  -KM              Exercise 4    Exercise Name 4 Standing alt march  -KM      Sets 4 2  -KM      Reps 4 10  -KM              Exercise 5    Exercise Name 5 Standing alt hip abd + TA  -KM      Sets 5 2  -KM      Reps 5 10  -KM              Exercise 6    Exercise Name 6 Standing alt hip ext + TA  -KM      Sets 6 2  -KM      Reps 6 10  -KM              Exercise 7    Exercise Name 7 Dynadisc TA/PN + LAQ  -KM      Sets 7 2  -KM      Reps 7 10  -KM              Exercise 8    Exercise Name 8 Dynadisc TA/PN + march  -KM      Sets 8 2  -KM      Reps 8 10  -KM              Exercise 9    Exercise Name 9 Sit to stands from chair  -KM      Reps 9 1x10; 1x5  -KM      Additional Comments no UE assist  -KM              Exercise 10    Exercise Name 10 Supine SKTC S  -KM      Reps 10 2  -KM      Time 10 30 sec hold  -KM              Exercise  11    Exercise Name 11 HL piriformis S  -KM      Reps 11 2  -KM      Time 11 30 sec hold  -KM              Exercise 12    Exercise Name 12 Bridges + iso add squeeze  -KM      Reps 12 1x10; 1x5  -KM            User Key  (r) = Recorded By, (t) = Taken By, (c) = Cosigned By    Initials Name Provider Type    Melodie Adler PTA Physical Therapist Assistant                         Manual Rx (last 36 hours)     Manual Treatments     Row Name 05/16/22 0700             Manual Rx 3    Manual Rx 3 Location L sidelying: R>L lumbar parapsinals, R posterior hip musculature/piriformis, lateral quad  -KM            User Key  (r) = Recorded By, (t) = Taken By, (c) = Cosigned By    Initials Name Provider Type    Melodie Adler PTA Physical Therapist Assistant                 PT OP Goals     Row Name 05/16/22 0800          PT Short Term Goals    STG Date to Achieve 05/16/22  -KM     STG 1 Demonstrate independence/compliance in performance of initial HEP  -KM     STG 1 Progress Met;Ongoing  -KM     STG 2 Demonstrate improved seated PN/postural positioning with seated core stabilization activities with minimal cues required to correct  -KM     STG 2 Progress Partially Met;Progressing  -KM     STG 3 Demonstrate independence in isometric TA contraction in various positions for functional carryover into daily activity performance  -KM     STG 3 Progress Met  -KM     STG 4 Demonstrate improved lumbar flex AROM fingertips to mid shin without increased pain  -KM     STG 4 Progress Ongoing  -KM            Long Term Goals    LTG Date to Achieve 06/06/22  -KM     LTG 1 Improve modified oswestry score 30% to or less  -KM     LTG 1 Progress Ongoing  -KM     LTG 2 Subjectively report 50% improvement or greater in RLE radicular symptoms with housework and daily activities  -KM     LTG 2 Progress Ongoing  -KM     LTG 3 Tolerate 15-20 minutes or greater of standing stabilization/strengthening activities without increased low  back/RLE pain & symptoms  -KM     LTG 3 Progress Ongoing  -KM     LTG 4 Demonstrate improved R ankle DF MMT to 4+/5 or greater  -KM     LTG 4 Progress Ongoing  -KM     LTG 5 Demonstrate improved R knee flex/ext MMT to 5/5  -KM     LTG 5 Progress Ongoing  -KM     LTG 6 Demonstrate proper lifting/body mechanics when lifting lightly weighted crate from ~8” stool <> waist without increased pain  -KM     LTG 6 Progress Ongoing  -KM            Time Calculation    PT Goal Re-Cert Due Date 05/16/22  -KM           User Key  (r) = Recorded By, (t) = Taken By, (c) = Cosigned By    Initials Name Provider Type    Melodie Adler PTA Physical Therapist Assistant                Therapy Education  Education Details: standing marcH, hip abd/ext  Given: Symptoms/condition management, HEP, Pain management, Posture/body mechanics  Program: Reinforced  How Provided: Verbal, Demonstration, Written  Provided to: Patient  Level of Understanding: Teach back education performed, Verbalized, Demonstrated              Time Calculation:   Start Time: 0802  Stop Time: 0900  Time Calculation (min): 58 min  Untimed Charges  PT Moist Heat Minutes: 10  Total Minutes  Untimed Charges Total Minutes: 10   Total Minutes: 10  Therapy Charges for Today     Code Description Service Date Service Provider Modifiers Qty    26913027309 HC PT THER PROC EA 15 MIN 5/16/2022 Melodie Fine PTA GP, CQ 2    43478368860 HC PT MANUAL THERAPY EA 15 MIN 5/16/2022 Melodie Fine PTA GP, CQ 1                    Melodie Fine PTA  5/16/2022

## 2022-05-18 ENCOUNTER — APPOINTMENT (OUTPATIENT)
Dept: PHYSICAL THERAPY | Facility: HOSPITAL | Age: 77
End: 2022-05-18

## 2022-05-19 ENCOUNTER — APPOINTMENT (OUTPATIENT)
Dept: PHYSICAL THERAPY | Facility: HOSPITAL | Age: 77
End: 2022-05-19

## 2022-05-20 ENCOUNTER — HOSPITAL ENCOUNTER (OUTPATIENT)
Dept: PHYSICAL THERAPY | Facility: HOSPITAL | Age: 77
Setting detail: THERAPIES SERIES
Discharge: HOME | End: 2022-05-20

## 2022-05-20 DIAGNOSIS — M54.50 CHRONIC BILATERAL LOW BACK PAIN WITHOUT SCIATICA: Primary | ICD-10-CM

## 2022-05-20 DIAGNOSIS — G89.29 CHRONIC BILATERAL LOW BACK PAIN WITHOUT SCIATICA: Primary | ICD-10-CM

## 2022-05-20 PROCEDURE — 97110 THERAPEUTIC EXERCISES: CPT | Performed by: PHYSICAL THERAPIST

## 2022-05-20 PROCEDURE — 97140 MANUAL THERAPY 1/> REGIONS: CPT | Performed by: PHYSICAL THERAPIST

## 2022-05-20 NOTE — THERAPY PROGRESS REPORT/RE-CERT
Outpatient Physical Therapy Ortho Progress Note  [facility]     Patient Name: Benedict Gomez  : 1945  MRN: [id number]  Today's Date: 2022      Visit Date: 2022     Attendance: 8 visits  Subjective improvement: 20%  MD appt: PRN  Recheck due: 22    Visit Dx:    ICD-10-CM ICD-9-CM   1. Chronic bilateral low back pain without sciatica  M54.50 724.2    G89.29 338.29       Patient Active Problem List   Diagnosis   • Dyspnea on exertion        Past Medical History:   Diagnosis Date   • Hypertension    • Spinal stenosis of lumbar region         Past Surgical History:   Procedure Laterality Date   • CHOLECYSTECTOMY     • ECTOPIC PREGNANCY SURGERY     • HYSTERECTOMY          PT Ortho     Row Name 22 0800       Subjective Comments    Subjective Comments Pt states overall things are doing better. Her hip and RLE pain is a lot better but her back really bothers her in the evenings. Mornings are good but by the end of the day, states she walking bent over and has to sit down to ease up the pain. Feels like therapy is helping. Reports 20% improvement.  -KG       Precautions and Contraindications    Precautions/Limitations no known precautions/limitations  -KG       Subjective Pain    Able to rate subjective pain? yes  -KG    Pre-Treatment Pain Level 3  -KG    Post-Treatment Pain Level 1  -KG       Posture/Observations    Posture/Observations Comments Improving overall postural awareness both standing and seated. Intermittent cues still needed with therex but does self-correct. Supine L IC slightly superior vs R, corrects with LAD.  -KG       Neural Tension Signs- Lower Quarter Clearing    Slump Bilateral:;Negative  -KG    SLR Bilateral:;Negative  -KG       Sensory Screen for Light Touch- Lower Quarter Clearing    L1 (inguinal area) Bilateral:;Intact  -KG    L2 (anterior mid thigh) Bilateral:;Intact  -KG    L3 (distal anterior thigh) Bilateral:;Intact  -KG    L4 (medial lower  leg/foot) Bilateral:;Intact  -KG    L5 (lateral lower leg/great toe) Bilateral:;Intact  -KG    S1 (bottom of foot) Bilateral:;Intact  -KG       SI/Hip Screen- Lower Quarter Clearing    ASIS compression Negative  -KG    ASIS distraction Negative  -KG    Tiffani's/Rogelio's test Right:;Positive;Left:;Negative  -KG    Posterior thigh sheer Negative  -KG       Lumbosacral Palpation    Piriformis Right:;Tender  -KG    Gluteus Harry Right:;Guarded/taut  -KG    Erector Spinae (Paraspinals) Bilateral:;Tender;Guarded/taut  R>L  -KG    Lumbosacral Palpation Comments Mild TTP R lumbar parapsinals, more TTP into post hip on R  -KG       General ROM    GENERAL ROM COMMENTS Bilateral hip, knee, ankle AROM WFL.  -KG       Head/Neck/Trunk    Trunk Extension AROM 75% full range; no pain  -KG    Trunk Flexion AROM Fingertips to proximal ankles, no low back pain, reports post RLE tightness  -KG    Trunk Lt Lateral Flexion AROM WFL no pain  -KG    Trunk Rt Lateral Flexion AROM 75% full range; pain contralateral  -KG       MMT Right Lower Ext    Rt Hip Flexion MMT, Gross Movement (5/5) normal  -KG    Rt Hip ABduction MMT, Gross Movement (4/5) good  -KG    Rt Hip ADduction MMT, Gross Movement (4+/5) good plus  -KG    Rt Knee Extension MMT, Gross Movement (5/5) normal  -KG    Rt Knee Flexion MMT, Gross Movement (4+/5) good plus  -KG    Rt Ankle Plantarflexion MMT, Gross Movement (5/5) normal  -KG    Rt Ankle Dorsiflexion MMT, Gross Movement (5/5) normal  -KG       MMT Left Lower Ext    Lt Hip Flexion MMT, Gross Movement (5/5) normal  -KG    Lt Hip ABduction MMT, Gross Movement (4+/5) good plus  -KG    Lt Hip ADduction MMT, Gross Movement (5/5) normal  -KG    Lt Knee Extension MMT, Gross Movement (5/5) normal  -KG    Lt Knee Flexion MMT, Gross Movement (5/5) normal  -KG    Lt Ankle Plantarflexion MMT, Gross Movement (5/5) normal  -KG    Lt Ankle Dorsiflexion MMT, Gross Movement (5/5) normal  -KG       Sensation    Sensation WNL? WFL  -KG     Light Touch No apparent deficits  -KG       Lower Extremity Flexibility    Hamstrings Bilateral:;Moderately limited  -KG    Hip Flexors Mildly limited  -KG    Hip External Rotators Bilateral:;Mildly limited  -KG    Hip Internal Rotators Bilateral:;Mildly limited  -KG       Transfers    Comment, (Transfers) Good log roll for sup<>sit transfer  -KG       Gait/Stairs (Locomotion)    Comment, (Gait/Stairs) Ambulates with no AD. Equal stance time bilaterally  -KG          User Key  (r) = Recorded By, (t) = Taken By, (c) = Cosigned By    Initials Name Provider Type    KG Nahomy Huynh, PT Physical Therapist                             PT Assessment/Plan     Row Name 05/20/22 0800          PT Assessment    Functional Limitations Impaired gait;Limitation in home management;Limitations in community activities;Performance in leisure activities;Performance in self-care ADL  -KG     Impairments Gait;Impaired flexibility;Impaired muscle endurance;Joint mobility;Muscle strength;Pain;Poor body mechanics;Posture;Range of motion  -KG     Assessment Comments Pt making steady progress overall with PT as evidenced by improvements in functional strength, core/postural stabiltiy, & activity tolerance. Pt doing reasonbly well wtih seated dynadisc core in regard to maintaing good PN positioning; minimal cues needed. Doing well with standing progressions during treatment and is tolerating better each week. Had no increased pain with any activities performed this date. Pt reports good improvements in her R hip/LE pain but still having increased low back pain later in the day, which is limiting gait and performance of daily activities. Pt had mild malalignment at pelvis but corrected well with LAD. Pt making good gains but still needs work on dynamic core/hip stability, posture/ awraeness, and functional strength & will continue to benefit from skilled PT.  -KG     Rehab Potential Good  -KG     Patient/caregiver  participated in establishment of treatment plan and goals Yes  -KG     Patient would benefit from skilled therapy intervention Yes  -KG            PT Plan    PT Frequency 2x/week  -KG     Predicted Duration of Therapy Intervention (PT) 6 more visits  -KG     PT Plan Comments Will continue for ~3 more weeks. Continue progressing standing stability as able. Try standing pallof press next visit. continue seated dynadisc core. Continue manaul prn.  -KG           User Key  (r) = Recorded By, (t) = Taken By, (c) = Cosigned By    Initials Name Provider Type    Nahomy Boudreaux, PT Physical Therapist                 Modalities     Row Name 05/20/22 0800             Moist Heat    Patient denies application of MH Yes  -KG            User Key  (r) = Recorded By, (t) = Taken By, (c) = Cosigned By    Initials Name Provider Type    Nahomy Boudreaux, PT Physical Therapist               OP Exercises     Row Name 05/20/22 0800             Subjective Comments    Subjective Comments Pt states overall things are doing better. Her hip and RLE pain is a lot better but her back really bothers her in the evenings. Mornings are good but by the end of the day, states she walking bent over and has to sit down to ease up the pain. Feels like therapy is helping. Reports 20% improvement.  -KG              Subjective Pain    Able to rate subjective pain? yes  -KG      Pre-Treatment Pain Level 3  -KG      Post-Treatment Pain Level 1  -KG              Exercise 1    Exercise Name 1 Seated HS S  -KG      Reps 1 2  -KG      Time 1 30 sec hold  -KG              Exercise 2    Exercise Name 2 Seated sciatic nerve glide  -KG      Sets 2 1  -KG      Reps 2 3 (10 AP ea)  -KG              Exercise 3    Exercise Name 3 Seated figure 4 S  -KG      Reps 3 2  -KG      Time 3 30 sec hold  -KG              Exercise 4    Exercise Name 4 Lumbar/BLE functional ROM and strength measurements  -KG              Exercise 5    Exercise Name 5 Airex stnading alt  "marching with TA  -KG      Cueing 5 Verbal  -KG      Sets 5 2  -KG      Reps 5 10  -KG              Exercise 6    Exercise Name 6 Fwd step ups wtih TA  -KG      Cueing 6 Verbal  -KG      Sets 6 1  -KG      Reps 6 15 ea LE  -KG      Additional Comments 4\" step  -KG              Exercise 7    Exercise Name 7 Airex beam sidestepping  -KG      Cueing 7 Verbal  -KG      Sets 7 1  -KG      Reps 7 4 laps  -KG      Additional Comments B HHA  -KG              Exercise 8    Exercise Name 8 Dynadisc seated TA/PN + LAQ  -KG      Cueing 8 Verbal  -KG      Sets 8 2  -KG      Reps 8 10  -KG              Exercise 9    Exercise Name 9 Dynadisc seatedTA/PN + march  -KG      Cueing 9 Verbal  -KG      Sets 9 2  -KG      Reps 9 10  -KG              Exercise 10    Exercise Name 10 Standing alt hip abd SLR wtih TA  -KG      Cueing 10 Verbal  -KG      Sets 10 1  -KG      Reps 10 15  -KG              Exercise 11    Exercise Name 11 Standing alt hip ext SLR with TA  -KG      Cueing 11 Verbal  -KG      Sets 11 1  -KG      Reps 11 15  -KG              Exercise 12    Exercise Name 12 Supine SKTC S  -KG      Cueing 12 Verbal  -KG      Reps 12 1 ea  -KG      Time 12 30\" hold  -KG              Exercise 13    Exercise Name 13 HL piriformis S  -KG      Cueing 13 Verbal  -KG      Reps 13 1 ea  -KG      Time 13 30\" hold  -KG              Exercise 14    Exercise Name 14 Bridges + iso add squeeze  -KG      Cueing 14 Verbal  -KG      Sets 14 2  -KG      Reps 14 10  -KG            User Key  (r) = Recorded By, (t) = Taken By, (c) = Cosigned By    Initials Name Provider Type    KG Nahomy Huynh, PT Physical Therapist                         Manual Rx (last 36 hours)     Manual Treatments     Row Name 05/20/22 0800             Manual Rx 1    Manual Rx 1 Location LLE/QL  -KG      Manual Rx 1 Type LAD  -KG              Manual Rx 2    Manual Rx 2 Location L sidelying: R>L lumbar parapsinals, R posterior hip musculature/piriformis, lateral quad  -KG      " Manual Rx 2 Type IASTM/MFR  -KG            User Key  (r) = Recorded By, (t) = Taken By, (c) = Cosigned By    Initials Name Provider Type    LAURENT Nahomy Huynh, PT Physical Therapist                 PT OP Goals     Row Name 05/20/22 0800          PT Short Term Goals    STG Date to Achieve 05/16/22  -KG     STG 1 Demonstrate independence/compliance in performance of initial HEP  -KG     STG 1 Progress Met;Ongoing  -KG     STG 2 Demonstrate improved seated PN/postural positioning with seated core stabilization activities with minimal cues required to correct  -KG     STG 2 Progress Met;Ongoing  -KG     STG 3 Demonstrate independence in isometric TA contraction in various positions for functional carryover into daily activity performance  -KG     STG 3 Progress Met  -KG     STG 4 Demonstrate improved lumbar flex AROM fingertips to mid shin without increased pain  -KG     STG 4 Progress Met  -KG            Long Term Goals    LTG Date to Achieve 06/10/22  -KG     LTG 1 Improve modified oswestry score 30% to or less  -KG     LTG 1 Progress Met  -KG     LTG 2 Subjectively report 50% improvement or greater in RLE radicular symptoms with housework and daily activities  -KG     LTG 2 Progress Met  -KG     LTG 3 Tolerate 15-20 minutes or greater of standing stabilization/strengthening activities without increased low back/RLE pain & symptoms  -KG     LTG 3 Progress Ongoing  -KG     LTG 4 Demonstrate improved R ankle DF MMT to 4+/5 or greater  -KG     LTG 4 Progress Met  -KG     LTG 5 Demonstrate improved R knee flex/ext MMT to 5/5  -KG     LTG 5 Progress Ongoing  -KG     LTG 6 Demonstrate proper lifting/body mechanics when lifting lightly weighted crate from ~8” stool <> waist without increased pain  -KG     LTG 6 Progress Ongoing  -KG            Time Calculation    PT Goal Re-Cert Due Date 06/10/22  -KG           User Key  (r) = Recorded By, (t) = Taken By, (c) = Cosigned By    Initials Name Provider Type    LAURENT Huynh  Nahomy MCGARRY, PT Physical Therapist                Therapy Education  Given: Symptoms/condition management, HEP, Pain management, Posture/body mechanics  Program: Reinforced  How Provided: Verbal, Demonstration, Written  Provided to: Patient  Level of Understanding: Teach back education performed, Verbalized, Demonstrated       Modified Oswestry  Modified Oswestry Score/Comments: 10 = 20%      Time Calculation:   Start Time: 0802  Stop Time: 0850  Time Calculation (min): 48 min  Therapy Charges for Today     Code Description Service Date Service Provider Modifiers Qty    25078079492  PT THER PROC EA 15 MIN 5/20/2022 Nahomy Huynh, PT GP 2    [id number]  PT MANUAL THERAPY EA 15 MIN 5/20/2022 Nahomy Huynh, PT GP 1          PT G-Codes  Modified Oswestry Score/Comments: 10 = 20%         Nahomy Huynh PT  5/20/2022

## 2022-05-23 ENCOUNTER — HOSPITAL ENCOUNTER (OUTPATIENT)
Dept: PHYSICAL THERAPY | Facility: HOSPITAL | Age: 77
Setting detail: THERAPIES SERIES
Discharge: HOME | End: 2022-05-23

## 2022-05-23 DIAGNOSIS — G89.29 CHRONIC BILATERAL LOW BACK PAIN WITHOUT SCIATICA: Primary | ICD-10-CM

## 2022-05-23 DIAGNOSIS — M54.50 CHRONIC BILATERAL LOW BACK PAIN WITHOUT SCIATICA: Primary | ICD-10-CM

## 2022-05-23 PROCEDURE — 97112 NEUROMUSCULAR REEDUCATION: CPT

## 2022-05-23 PROCEDURE — 97110 THERAPEUTIC EXERCISES: CPT

## 2022-05-23 PROCEDURE — 97140 MANUAL THERAPY 1/> REGIONS: CPT

## 2022-05-23 NOTE — THERAPY TREATMENT NOTE
Outpatient Physical Therapy Ortho Treatment Note  [facility]     Patient Name: Benedict Gomez  : 1945  MRN: [id number]  Today's Date: 2022      Visit Date: 2022    Attendance: 9 visits  Subjective improvement: 20% improvement  MD appt: PRN  Recheck due: 2022    Visit Dx:    ICD-10-CM ICD-9-CM   1. Chronic bilateral low back pain without sciatica  M54.50 724.2    G89.29 338.29       Patient Active Problem List   Diagnosis   • Dyspnea on exertion        Past Medical History:   Diagnosis Date   • Hypertension    • Spinal stenosis of lumbar region         Past Surgical History:   Procedure Laterality Date   • CHOLECYSTECTOMY     • ECTOPIC PREGNANCY SURGERY     • HYSTERECTOMY          PT Ortho     Row Name 22 1500       Precautions and Contraindications    Precautions/Limitations no known precautions/limitations  -KM       Subjective Pain    Able to rate subjective pain? yes  -KM    Pre-Treatment Pain Level 2  -KM    Post-Treatment Pain Level 2  -KM       Posture/Observations    Posture/Observations Comments no malalginment today  -KM          User Key  (r) = Recorded By, (t) = Taken By, (c) = Cosigned By    Initials Name Provider Type    Melodie Adler PTA Physical Therapist Assistant                             PT Assessment/Plan     Row Name 22 1500          PT Assessment    Assessment Comments pt showed = alignment at pelvis today. Shows good form with CKC activities. no pain or discomfort in hip or low back relayed today. pt able to keep fairly good PN alginment on dynadisc. Did well with intro into tband pallof press  -KM            PT Plan    PT Frequency 2x/week  -KM     PT Plan Comments Lateral step ups + TA next  -KM           User Key  (r) = Recorded By, (t) = Taken By, (c) = Cosigned By    Initials Name Provider Type    Melodie Adler PTA Physical Therapist Assistant                 Modalities     Row Name 22 1480              Moist Heat    Patient denies application of MH Yes  -KM            User Key  (r) = Recorded By, (t) = Taken By, (c) = Cosigned By    Initials Name Provider Type    Melodie Adler PTA Physical Therapist Assistant               OP Exercises     Row Name 05/23/22 1500             Subjective Comments    Subjective Comments Doing well. Has had 2-3 really good days with minimal pain  -KM              Subjective Pain    Able to rate subjective pain? yes  -KM      Pre-Treatment Pain Level 2  -KM      Post-Treatment Pain Level 2  -KM              Exercise 1    Exercise Name 1 Seated HS S  -KM      Reps 1 2  -KM      Time 1 30 sec hold  -KM              Exercise 2    Exercise Name 2 Seated sciatic nerve glide  -KM      Sets 2 1  -KM      Reps 2 3 (10 AP ea)  -KM              Exercise 3    Exercise Name 3 Seated figure 4 S  -KM      Reps 3 2  -KM      Time 3 30 sec hold  -KM              Exercise 4    Exercise Name 4 Standing tband pallof press  -KM      Sets 4 1  -KM      Reps 4 15 ea direction  -KM      Additional Comments red  -KM              Exercise 5    Exercise Name 5 Airex stnading alt marching with TA  -KM      Cueing 5 Verbal  -KM      Sets 5 2  -KM      Reps 5 10  -KM              Exercise 6    Exercise Name 6 Fwd step ups wtih TA  -KM      Cueing 6 Verbal  -KM      Sets 6 1  -KM      Reps 6 15 ea LE  -KM              Exercise 7    Exercise Name 7 Airex beam sidestepping  -KM      Cueing 7 Verbal  -KM      Sets 7 1  -KM      Reps 7 4 laps  -KM              Exercise 8    Exercise Name 8 Dynadisc seated TA/PN + LAQ  -KM      Cueing 8 Verbal  -KM      Sets 8 2  -KM      Reps 8 10  -KM              Exercise 9    Exercise Name 9 Dynadisc seatedTA/PN + march  -KM      Cueing 9 Verbal  -KM      Sets 9 2  -KM      Reps 9 10  -KM              Exercise 10    Exercise Name 10 Standing alt hip abd SLR wtih TA  -KM      Cueing 10 Verbal  -KM      Sets 10 1  -KM      Reps 10 15  -KM              Exercise 11  "   Exercise Name 11 Standing alt hip ext SLR with TA  -KM      Cueing 11 Verbal  -KM      Sets 11 1  -KM      Reps 11 15  -KM              Exercise 12    Exercise Name 12 Supine SKTC S  -KM      Cueing 12 Verbal  -KM      Reps 12 1 ea  -KM      Time 12 30\" hold  -KM              Exercise 13    Exercise Name 13 HL piriformis S  -KM      Cueing 13 Verbal  -KM      Reps 13 1 ea  -KM      Time 13 30\" hold  -KM              Exercise 14    Exercise Name 14 Bridges + iso add squeeze  -KM      Cueing 14 Verbal  -KM      Sets 14 2  -KM      Reps 14 10  -KM            User Key  (r) = Recorded By, (t) = Taken By, (c) = Cosigned By    Initials Name Provider Type    Melodie Adler PTA Physical Therapist Assistant                         Manual Rx (last 36 hours)     Manual Treatments     Row Name 05/23/22 1700             Manual Rx 2    Manual Rx 2 Location L sidelying: R>L lumbar parapsinals, R posterior hip musculature/piriformis, lateral quad  -KM      Manual Rx 2 Type IASTM/MFR  -KM            User Key  (r) = Recorded By, (t) = Taken By, (c) = Cosigned By    Initials Name Provider Type    Melodie Adler PTA Physical Therapist Assistant                 PT OP Goals     Row Name 05/23/22 1500          PT Short Term Goals    STG Date to Achieve 05/16/22  -KM     STG 1 Demonstrate independence/compliance in performance of initial HEP  -KM     STG 1 Progress Met;Ongoing  -KM     STG 2 Demonstrate improved seated PN/postural positioning with seated core stabilization activities with minimal cues required to correct  -KM     STG 2 Progress Met;Ongoing  -KM     STG 3 Demonstrate independence in isometric TA contraction in various positions for functional carryover into daily activity performance  -KM     STG 3 Progress Met  -KM     STG 4 Demonstrate improved lumbar flex AROM fingertips to mid shin without increased pain  -KM     STG 4 Progress Met  -KM            Long Term Goals    LTG Date to Achieve 06/10/22 "  -KM     LTG 1 Improve modified oswestry score 30% to or less  -KM     LTG 1 Progress Met  -KM     LTG 2 Subjectively report 50% improvement or greater in RLE radicular symptoms with housework and daily activities  -KM     LTG 2 Progress Met  -KM     LTG 3 Tolerate 15-20 minutes or greater of standing stabilization/strengthening activities without increased low back/RLE pain & symptoms  -KM     LTG 3 Progress Ongoing  -KM     LTG 4 Demonstrate improved R ankle DF MMT to 4+/5 or greater  -KM     LTG 4 Progress Met  -KM     LTG 5 Demonstrate improved R knee flex/ext MMT to 5/5  -KM     LTG 5 Progress Ongoing  -KM     LTG 6 Demonstrate proper lifting/body mechanics when lifting lightly weighted crate from ~8” stool <> waist without increased pain  -KM     LTG 6 Progress Ongoing  -KM            Time Calculation    PT Goal Re-Cert Due Date 06/10/22  -KM           User Key  (r) = Recorded By, (t) = Taken By, (c) = Cosigned By    Initials Name Provider Type    Melodie Adler PTA Physical Therapist Assistant                Therapy Education  Education Details: bridges + add squeeze  Given: Symptoms/condition management, HEP, Pain management, Posture/body mechanics  Program: Reinforced  How Provided: Verbal, Demonstration, Written  Provided to: Patient  Level of Understanding: Teach back education performed, Verbalized, Demonstrated              Time Calculation:   Start Time: 1518  Stop Time: 1600  Time Calculation (min): 42 min  Therapy Charges for Today     Code Description Service Date Service Provider Modifiers Qty    02459747473 HC PT THER PROC EA 15 MIN 5/23/2022 Melodie Fine PTA GP, CQ 1    09075352787 HC PT MANUAL THERAPY EA 15 MIN 5/23/2022 Melodie Fine PTA GP, CQ 1    [id number] HC PT NEUROMUSC RE EDUCATION EA 15 MIN 5/23/2022 Melodie Fine PTA GP, CQ 1                    Melodie Fine PTA  5/23/2022

## 2022-05-25 ENCOUNTER — APPOINTMENT (OUTPATIENT)
Dept: PHYSICAL THERAPY | Facility: HOSPITAL | Age: 77
End: 2022-05-25

## 2022-05-26 ENCOUNTER — HOSPITAL ENCOUNTER (OUTPATIENT)
Dept: PHYSICAL THERAPY | Facility: HOSPITAL | Age: 77
Setting detail: THERAPIES SERIES
Discharge: HOME | End: 2022-05-26

## 2022-05-26 DIAGNOSIS — M54.50 CHRONIC BILATERAL LOW BACK PAIN WITHOUT SCIATICA: Primary | ICD-10-CM

## 2022-05-26 DIAGNOSIS — G89.29 CHRONIC BILATERAL LOW BACK PAIN WITHOUT SCIATICA: Primary | ICD-10-CM

## 2022-05-26 PROCEDURE — 97140 MANUAL THERAPY 1/> REGIONS: CPT

## 2022-05-26 PROCEDURE — 97110 THERAPEUTIC EXERCISES: CPT

## 2022-05-26 NOTE — THERAPY TREATMENT NOTE
Outpatient Physical Therapy Ortho Treatment Note  [facility]     Patient Name: Benedict Gomez  : 1945  MRN: [id number]  Today's Date: 2022      Visit Date: 2022    Attendance: 10 visits  Subjective improvement: 20%  MD appt: prn  Recheck due: 6/10/22    Visit Dx:    ICD-10-CM ICD-9-CM   1. Chronic bilateral low back pain without sciatica  M54.50 724.2    G89.29 338.29       Patient Active Problem List   Diagnosis   • Dyspnea on exertion        Past Medical History:   Diagnosis Date   • Hypertension    • Spinal stenosis of lumbar region         Past Surgical History:   Procedure Laterality Date   • CHOLECYSTECTOMY     • ECTOPIC PREGNANCY SURGERY     • HYSTERECTOMY          PT Ortho     Row Name 22 1500       Precautions and Contraindications    Precautions/Limitations no known precautions/limitations  -PM       Subjective Pain    Able to rate subjective pain? yes  -PM    Post-Treatment Pain Level 1  not really pn but very fatigued feeling in back  -PM       Posture/Observations    Posture/Observations Comments supine L IC elevated - corrects IC. Pt cont with ASIS R inferior to L -> = after MET  -PM          User Key  (r) = Recorded By, (t) = Taken By, (c) = Cosigned By    Initials Name Provider Type    PM Naomi Michel PTA Physical Therapist Assistant                                   OP Exercises     Row Name 22 1500             Subjective Comments    Subjective Comments No new concerns.  -PM              Subjective Pain    Able to rate subjective pain? yes  -PM      Pre-Treatment Pain Level --  none now but ususally when up doing is when it hurts  -PM      Post-Treatment Pain Level 1  not really pn but very fatigued feeling in back  -PM              Exercise 1    Exercise Name 1 Seated HS S  -PM      Reps 1 2  -PM      Time 1 30 sec hold  -PM              Exercise 2    Exercise Name 2 Seated sciatic nerve glide  -PM      Sets 2 1  -PM      Reps 2 3  "(10 AP ea)  -PM              Exercise 3    Exercise Name 3 Seated figure 4 S  -PM      Reps 3 2  -PM      Time 3 30 sec hold  -PM              Exercise 4    Exercise Name 4 Standing tband pallof press  -PM      Sets 4 1  -PM      Reps 4 deferred  -PM              Exercise 5    Exercise Name 5 stding alt marching with TA  -PM      Cueing 5 Verbal  -PM      Sets 5 2  -PM      Reps 5 10  -PM              Exercise 6    Exercise Name 6 Fwd step ups wtih TA  -PM      Cueing 6 Verbal  -PM      Sets 6 1  -PM      Reps 6 20 ea LE  -PM      Additional Comments 4\" bilaterally  -PM              Exercise 7    Exercise Name 7 Lat step ups w/posture and TA  -PM      Cueing 7 Verbal  -PM      Sets 7 1  -PM      Reps 7 20  -PM      Additional Comments 4\" bilaterally  -PM              Exercise 8    Exercise Name 8 Dynadisc seated TA/PN + LAQ  -PM      Cueing 8 Verbal  -PM      Sets 8 2  -PM      Reps 8 10  -PM              Exercise 9    Exercise Name 9 Dynadisc seatedTA/PN + march  -PM      Cueing 9 Verbal  -PM      Sets 9 2  -PM      Reps 9 10  -PM              Exercise 10    Exercise Name 10 Standing alt hip abd SLR wtih TA  -PM      Cueing 10 Verbal  -PM      Sets 10 2  -PM      Reps 10 10  -PM              Exercise 11    Exercise Name 11 Standing alt hip ext SLR with TA  -PM      Cueing 11 Verbal  -PM      Sets 11 2  -PM      Reps 11 10  -PM              Exercise 12    Exercise Name 12 Supine SKTC S  -PM      Cueing 12 Verbal  -PM      Reps 12 1 ea  -PM      Time 12 30\" hold  -PM              Exercise 13    Exercise Name 13 HL piriformis S  -PM      Cueing 13 Verbal  -PM      Reps 13 2  -PM      Time 13 30\" hold  -PM              Exercise 14    Exercise Name 14 Bridges + iso add squeeze  -PM      Cueing 14 Verbal  -PM      Sets 14 2  -PM      Reps 14 10  -PM            User Key  (r) = Recorded By, (t) = Taken By, (c) = Cosigned By    Initials Name Provider Type    PM Naomi Michel, MADLEAINE Physical Therapist Assistant          "     A:  Pt presented with more pain today; malalignment at pelvis; did pretty good with dynadisc and cues provided. Some decr in pain at end of Rx.Pt presented with more pain today; malalignment at pelvis; did pretty good with dynadisc and cues provided. Some decr in pain at end of Rx.    P: progress fxn strength/stability as sherry  Naomi Michel PTA     Corrected omitted data       Manual Rx (last 36 hours)     Manual Treatments     Row Name 05/26/22 1500             Manual Rx 2    Manual Rx 2 Location L sidelying: R>L lumbar parapsinals, R posterior hip musculature/piriformis  -PM      Manual Rx 2 Type STM/MFR  -PM            User Key  (r) = Recorded By, (t) = Taken By, (c) = Cosigned By    Initials Name Provider Type    PM Naomi Michel, MADELAINE Physical Therapist Assistant                 PT OP Goals     Row Name 05/26/22 1500          PT Short Term Goals    STG Date to Achieve 05/16/22  -PM     STG 1 Demonstrate independence/compliance in performance of initial HEP  -PM     STG 1 Progress Met;Ongoing  -PM     STG 2 Demonstrate improved seated PN/postural positioning with seated core stabilization activities with minimal cues required to correct  -PM     STG 2 Progress Met;Ongoing  -PM     STG 3 Demonstrate independence in isometric TA contraction in various positions for functional carryover into daily activity performance  -PM     STG 3 Progress Met  -PM     STG 4 Demonstrate improved lumbar flex AROM fingertips to mid shin without increased pain  -PM     STG 4 Progress Met  -PM            Long Term Goals    LTG Date to Achieve 06/10/22  -PM     LTG 1 Improve modified oswestry score 30% to or less  -PM     LTG 1 Progress Met  -PM     LTG 2 Subjectively report 50% improvement or greater in RLE radicular symptoms with housework and daily activities  -PM     LTG 2 Progress Met  -PM     LTG 3 Tolerate 15-20 minutes or greater of standing stabilization/strengthening activities without increased low back/RLE pain &  symptoms  -PM     LTG 3 Progress Ongoing  -PM     LTG 4 Demonstrate improved R ankle DF MMT to 4+/5 or greater  -PM     LTG 4 Progress Met  -PM     LTG 5 Demonstrate improved R knee flex/ext MMT to 5/5  -PM     LTG 5 Progress Ongoing  -PM     LTG 6 Demonstrate proper lifting/body mechanics when lifting lightly weighted crate from ~8” stool <> waist without increased pain  -PM     LTG 6 Progress Ongoing  -PM            Time Calculation    PT Goal Re-Cert Due Date 06/10/22  -PM           User Key  (r) = Recorded By, (t) = Taken By, (c) = Cosigned By    Initials Name Provider Type    PM Naomi Michel PTA Physical Therapist Assistant                Therapy Education  Given: Symptoms/condition management, HEP, Pain management, Posture/body mechanics  Program: Reinforced  How Provided: Verbal, Demonstration, Written  Provided to: Patient  Level of Understanding: Teach back education performed, Verbalized, Demonstrated              Time Calculation:   Start Time: 1503  Stop Time: 1600  Time Calculation (min): 57 min  Therapy Charges for Today     Code Description Service Date Service Provider Modifiers Qty    70510885991 HC PT THER PROC EA 15 MIN 5/26/2022 Naomi Michel PTA GP, CQ 3    95893119502 HC PT MANUAL THERAPY EA 15 MIN 5/26/2022 Naomi Michel PTA GP, CQ 1                    Naomi Michel PTA  5/26/2022

## 2022-05-31 ENCOUNTER — HOSPITAL ENCOUNTER (OUTPATIENT)
Dept: PHYSICAL THERAPY | Facility: HOSPITAL | Age: 77
Setting detail: THERAPIES SERIES
Discharge: HOME | End: 2022-05-31

## 2022-05-31 DIAGNOSIS — M54.50 CHRONIC BILATERAL LOW BACK PAIN WITHOUT SCIATICA: Primary | ICD-10-CM

## 2022-05-31 DIAGNOSIS — G89.29 CHRONIC BILATERAL LOW BACK PAIN WITHOUT SCIATICA: Primary | ICD-10-CM

## 2022-05-31 PROCEDURE — 97110 THERAPEUTIC EXERCISES: CPT

## 2022-05-31 PROCEDURE — 97140 MANUAL THERAPY 1/> REGIONS: CPT

## 2022-06-02 ENCOUNTER — HOSPITAL ENCOUNTER (OUTPATIENT)
Dept: PHYSICAL THERAPY | Facility: HOSPITAL | Age: 77
Setting detail: THERAPIES SERIES
Discharge: HOME | End: 2022-06-02

## 2022-06-02 DIAGNOSIS — G89.29 CHRONIC BILATERAL LOW BACK PAIN WITHOUT SCIATICA: Primary | ICD-10-CM

## 2022-06-02 DIAGNOSIS — M54.50 CHRONIC BILATERAL LOW BACK PAIN WITHOUT SCIATICA: Primary | ICD-10-CM

## 2022-06-02 PROCEDURE — 97110 THERAPEUTIC EXERCISES: CPT

## 2022-06-02 PROCEDURE — 97140 MANUAL THERAPY 1/> REGIONS: CPT

## 2022-06-02 PROCEDURE — 97112 NEUROMUSCULAR REEDUCATION: CPT

## 2022-06-02 NOTE — THERAPY TREATMENT NOTE
Outpatient Physical Therapy Ortho Treatment Note  [facility]     Patient Name: Benedict Gomez  : 1945  MRN: [id number]  Today's Date: 2022      Visit Date: 2022    Attendance: 12 visits  Subjective improvement: 20% improvement  MD appt: PRN  Recheck due: 6/10/22    Visit Dx:    ICD-10-CM ICD-9-CM   1. Chronic bilateral low back pain without sciatica  M54.50 724.2    G89.29 338.29       Patient Active Problem List   Diagnosis   • Dyspnea on exertion        Past Medical History:   Diagnosis Date   • Hypertension    • Spinal stenosis of lumbar region         Past Surgical History:   Procedure Laterality Date   • CHOLECYSTECTOMY     • ECTOPIC PREGNANCY SURGERY     • HYSTERECTOMY          PT Ortho     Row Name 22 1300       Subjective Comments    Subjective Comments states that she felt better for a short while after last visit but the pain started coming back again.  -KM       Precautions and Contraindications    Precautions/Limitations no known precautions/limitations  -KM       Subjective Pain    Able to rate subjective pain? yes  -KM    Pre-Treatment Pain Level 6  -KM       Posture/Observations    Posture/Observations Comments alignment was = today.  -KM          User Key  (r) = Recorded By, (t) = Taken By, (c) = Cosigned By    Initials Name Provider Type    Melodie Adler PTA Physical Therapist Assistant                             PT Assessment/Plan     Row Name 22 1300          PT Assessment    Assessment Comments pt able to tolerate treatment much better today. Able to resume back to previous therex and able to increase reps back to 20 as she did previously. No malalignment today. Felt better post manual  -KM            PT Plan    PT Frequency 2x/week  -KM     PT Plan Comments Recheck next visit. Try multifidus step ups and lateral step up and opp hip abd  -KM           User Key  (r) = Recorded By, (t) = Taken By, (c) = Cosigned By    Initials  Name Provider Type    Melodie Adler MARCIN, PTA Physical Therapist Assistant                   OP Exercises     Row Name 06/02/22 1300             Subjective Comments    Subjective Comments states that she felt better for a short while after last visit but the pain started coming back again.  -KM              Subjective Pain    Able to rate subjective pain? yes  -KM      Pre-Treatment Pain Level 6  -KM              Exercise 1    Exercise Name 1 Seated HS S  -KM      Reps 1 2  -KM      Time 1 30 sec hold  -KM              Exercise 2    Exercise Name 2 Seated sciatic nerve glide  -KM      Sets 2 1  -KM      Reps 2 3 (10 AP ea)  -KM              Exercise 3    Exercise Name 3 Seated figure 4 S  -KM      Reps 3 2  -KM      Time 3 30 sec hold  -KM              Exercise 4    Exercise Name 4 Dynadisc TA/PN + LAQ  -KM      Sets 4 2  -KM      Reps 4 10  -KM              Exercise 5    Exercise Name 5 Dynadisc TA/PN + march  -KM      Sets 5 2  -KM      Reps 5 10  -KM              Exercise 6    Exercise Name 6 Standing alt hip abd + TA  -KM      Sets 6 2  -KM      Reps 6 10  -KM              Exercise 7    Exercise Name 7 Standing alt hip ext + TA  -KM      Sets 7 2  -KM      Reps 7 10  -KM              Exercise 8    Exercise Name 8 Fwd step ups + TA  -KM      Sets 8 2  -KM      Reps 8 10 bilateral  -KM      Additional Comments 4 inch  -KM              Exercise 9    Exercise Name 9 Lateral step ups + TA  -KM      Sets 9 2  -KM      Reps 9 10 bilateral  -KM      Additional Comments 4 inch  -KM              Exercise 10    Exercise Name 10 Sit to stands from chair  -KM      Sets 10 1  -KM      Reps 10 10  -KM      Additional Comments no UE assist  -KM              Exercise 11    Exercise Name 11 Lateral side stepping + tband loop  -KM      Sets 11 1  -KM      Reps 11 4 laps  -KM      Additional Comments peach loop above knees  -KM              Exercise 12    Exercise Name 12 Supine SKTC S  -KM      Reps 12 2  -KM      Time 12  30 sec hold  -KM              Exercise 13    Exercise Name 13 HL piriformis S  -KM      Reps 13 2  -KM      Time 13 30 sec hold  -KM              Exercise 14    Exercise Name 14 Bridges + add squeeze  -KM      Sets 14 2  -KM      Reps 14 10  -KM      Time 14 5 sec hold  -KM            User Key  (r) = Recorded By, (t) = Taken By, (c) = Cosigned By    Initials Name Provider Type    Melodie Adler PTA Physical Therapist Assistant                         Manual Rx (last 36 hours)     Manual Treatments     Row Name 06/02/22 1300             Manual Rx 2    Manual Rx 2 Location L sidelying: R>L lumbar parapsinals, R posterior hip musculature/piriformis  -KM      Manual Rx 2 Type STM/MFR  -KM            User Key  (r) = Recorded By, (t) = Taken By, (c) = Cosigned By    Initials Name Provider Type    Melodie Adler PTA Physical Therapist Assistant                 PT OP Goals     Row Name 06/02/22 1300          PT Short Term Goals    STG Date to Achieve 05/16/22  -KM     STG 1 Demonstrate independence/compliance in performance of initial HEP  -KM     STG 1 Progress Met;Ongoing  -KM     STG 2 Demonstrate improved seated PN/postural positioning with seated core stabilization activities with minimal cues required to correct  -KM     STG 2 Progress Met;Ongoing  -KM     STG 3 Demonstrate independence in isometric TA contraction in various positions for functional carryover into daily activity performance  -KM     STG 3 Progress Met  -KM     STG 4 Demonstrate improved lumbar flex AROM fingertips to mid shin without increased pain  -KM     STG 4 Progress Met  -KM            Long Term Goals    LTG Date to Achieve 06/10/22  -KM     LTG 1 Improve modified oswestry score 30% to or less  -KM     LTG 1 Progress Met  -KM     LTG 2 Subjectively report 50% improvement or greater in RLE radicular symptoms with housework and daily activities  -KM     LTG 2 Progress Met  -KM     LTG 3 Tolerate 15-20 minutes or greater of  standing stabilization/strengthening activities without increased low back/RLE pain & symptoms  -KM     LTG 3 Progress Ongoing  -KM     LTG 4 Demonstrate improved R ankle DF MMT to 4+/5 or greater  -KM     LTG 4 Progress Met  -KM     LTG 5 Demonstrate improved R knee flex/ext MMT to 5/5  -KM     LTG 5 Progress Ongoing  -KM     LTG 6 Demonstrate proper lifting/body mechanics when lifting lightly weighted crate from ~8” stool <> waist without increased pain  -KM     LTG 6 Progress Ongoing  -KM            Time Calculation    PT Goal Re-Cert Due Date 06/10/22  -KM           User Key  (r) = Recorded By, (t) = Taken By, (c) = Cosigned By    Initials Name Provider Type    Melodie Adler PTA Physical Therapist Assistant                Therapy Education  Given: Symptoms/condition management, HEP, Pain management, Posture/body mechanics  Program: Reinforced  How Provided: Verbal, Demonstration, Written  Provided to: Patient  Level of Understanding: Teach back education performed, Verbalized, Demonstrated              Time Calculation:   Start Time: 1301  Stop Time: 1346  Time Calculation (min): 45 min  Therapy Charges for Today     Code Description Service Date Service Provider Modifiers Qty    90468720377 HC PT THER PROC EA 15 MIN 6/2/2022 Melodie Fine PTA GP, CQ 1    53455160472 HC PT NEUROMUSC RE EDUCATION EA 15 MIN 6/2/2022 Melodie Fine PTA GP, CQ 1    52693030779 HC PT MANUAL THERAPY EA 15 MIN 6/2/2022 Melodie Fine PTA GP, CQ 1                    Melodie Fine PTA  6/2/2022

## 2022-06-06 ENCOUNTER — HOSPITAL ENCOUNTER (OUTPATIENT)
Dept: PHYSICAL THERAPY | Facility: HOSPITAL | Age: 77
Setting detail: THERAPIES SERIES
Discharge: HOME | End: 2022-06-06

## 2022-06-06 DIAGNOSIS — M54.50 CHRONIC BILATERAL LOW BACK PAIN WITHOUT SCIATICA: Primary | ICD-10-CM

## 2022-06-06 DIAGNOSIS — G89.29 CHRONIC BILATERAL LOW BACK PAIN WITHOUT SCIATICA: Primary | ICD-10-CM

## 2022-06-06 PROCEDURE — 97140 MANUAL THERAPY 1/> REGIONS: CPT | Performed by: PHYSICAL THERAPIST

## 2022-06-06 PROCEDURE — 97110 THERAPEUTIC EXERCISES: CPT | Performed by: PHYSICAL THERAPIST

## 2022-06-07 NOTE — THERAPY PROGRESS REPORT/RE-CERT
Outpatient Physical Therapy Ortho Progress Note  [facility]     Patient Name: Benedict Gomez  : 1945  MRN: [id number]  Today's Date: 2022      Visit Date: 2022     Attendance: 13 visits  Subjective improvement: 20% improvement  MD appt: PRN; Neurosurgeon 2022  Recheck due: 2022    Visit Dx:    ICD-10-CM ICD-9-CM   1. Chronic bilateral low back pain without sciatica  M54.50 724.2    G89.29 338.29       Patient Active Problem List   Diagnosis   • Dyspnea on exertion        Past Medical History:   Diagnosis Date   • Hypertension    • Spinal stenosis of lumbar region         Past Surgical History:   Procedure Laterality Date   • CHOLECYSTECTOMY     • ECTOPIC PREGNANCY SURGERY     • HYSTERECTOMY               PT Ortho     Row Name 22 0800       Subjective Comments    Subjective Comments Pt states her leg has gotten a lot better, doesn't have pain in it anymore. Still has some R hip pain but the pain hasn't changed in her low back. Still hurts to stand or walk for short periods. States this morning it doesn't feel too bad.  -KG       Precautions and Contraindications    Precautions/Limitations no known precautions/limitations  -KG       Posture/Observations    Posture/Observations Comments Improving overall postural awareness but still requires intermittent cues during therex. Does self-correct. Tends to fwd flex slightly at hips when fatigued. Supine L IC & ASIS superior vs R. Corrects with LAD/MET  -KG       Neural Tension Signs- Lower Quarter Clearing    Slump Bilateral:;Negative  -KG    SLR Bilateral:;Negative  -KG       Sensory Screen for Light Touch- Lower Quarter Clearing    L1 (inguinal area) Bilateral:;Intact  -KG    L2 (anterior mid thigh) Bilateral:;Intact  -KG    L3 (distal anterior thigh) Bilateral:;Intact  -KG    L4 (medial lower leg/foot) Bilateral:;Intact  -KG    L5 (lateral lower leg/great toe) Bilateral:;Intact  -KG    S1 (bottom of foot)  Bilateral:;Intact  -KG       SI/Hip Screen- Lower Quarter Clearing    ASIS compression Negative  -KG    ASIS distraction Negative  -KG    Tiffani's/Rogelio's test Right:;Positive;Left:;Negative  -KG    Posterior thigh sheer Negative  -KG       Lumbosacral Palpation    Piriformis Right:;Tender  -KG    Gluteus Harry Right:;Guarded/taut  -KG    Erector Spinae (Paraspinals) Bilateral:;Tender;Guarded/taut  R>L  -KG       Head/Neck/Trunk    Trunk Extension AROM 75% full range with mild low back pain  -KG    Trunk Flexion AROM Fingertips to proximal ankles, no pain  -KG    Trunk Lt Lateral Flexion AROM WFL with tightness on R  -KG    Trunk Rt Lateral Flexion AROM 75% full range with pain ipsilateral  -KG       MMT Right Lower Ext    Rt Hip Flexion MMT, Gross Movement (5/5) normal  -KG    Rt Hip ABduction MMT, Gross Movement (4/5) good  -KG    Rt Hip ADduction MMT, Gross Movement (4+/5) good plus  -KG    Rt Knee Extension MMT, Gross Movement (5/5) normal  -KG    Rt Knee Flexion MMT, Gross Movement (5/5) normal  -KG    Rt Ankle Plantarflexion MMT, Gross Movement (5/5) normal  -KG    Rt Ankle Dorsiflexion MMT, Gross Movement (5/5) normal  -KG       MMT Left Lower Ext    Lt Hip Flexion MMT, Gross Movement (5/5) normal  -KG    Lt Hip ABduction MMT, Gross Movement (4+/5) good plus  -KG    Lt Hip ADduction MMT, Gross Movement (5/5) normal  -KG    Lt Knee Extension MMT, Gross Movement (5/5) normal  -KG    Lt Knee Flexion MMT, Gross Movement (5/5) normal  -KG    Lt Ankle Plantarflexion MMT, Gross Movement (5/5) normal  -KG    Lt Ankle Dorsiflexion MMT, Gross Movement (5/5) normal  -KG       Sensation    Sensation WNL? WFL  -KG    Light Touch No apparent deficits  -KG       Lower Extremity Flexibility    Hamstrings Bilateral:;Moderately limited  -KG    Hip Flexors Bilateral:;Mildly limited  -KG    Hip External Rotators Bilateral:;Mildly limited  -KG    Hip Internal Rotators Bilateral:;Mildly limited  -KG       Transfers    Comment,  (Transfers) Good log roll with sup<>sit  -KG       Gait/Stairs (Locomotion)    Comment, (Gait/Stairs) Ambulates with no AD. OVerall non-antalgic gait. Slight fwd flex at hips with fatigue.  -KG          User Key  (r) = Recorded By, (t) = Taken By, (c) = Cosigned By    Initials Name Provider Type    KG Nahomy Huynh, PT Physical Therapist                             PT Assessment/Plan     Row Name 06/06/22 0800          PT Assessment    Functional Limitations Limitation in home management;Limitations in community activities;Performance in leisure activities;Performance in self-care ADL  -KG     Impairments Impaired flexibility;Impaired muscle endurance;Joint mobility;Muscle strength;Pain;Poor body mechanics;Posture;Range of motion  -KG     Assessment Comments Pt continues to make slow progress overall with PT. Pt continues with low back pain that increases with functional activities and prolonged standing. Pt does demonstrate resulotion of RLE radiating pain but still has some intermittent R hip pain. Pt has done fairly well with strength and stability progressions. Pt having less pain this date. Able to progress to step ups with opp SLR but did have some pain towards end of reps with abd. Did reasonbly well with intro to quadruped activities, using stability for support under hips. Pt reports only 20% overall improvement. Pt returns to neurosurgeon for follow up on 6/20/2022. Per discussion with pt, will place on hold at next visit until she sees the MD.  -KG     Rehab Potential Good  -KG     Patient/caregiver participated in establishment of treatment plan and goals Yes  -KG     Patient would benefit from skilled therapy intervention Yes  -KG            PT Plan    PT Frequency 2x/week  -KG     Predicted Duration of Therapy Intervention (PT) 1 more visit  -KG     PT Plan Comments Will plan to place on hold next visit. Finalize HEP. Pt goes back to neurosurgeon on 6/20/2022  -KG           User Key  (r) = Recorded  "By, (t) = Taken By, (c) = Cosigned By    Initials Name Provider Type    KG Nahomy Huynh ZEFERINO, PT Physical Therapist                   OP Exercises     Row Name 06/06/22 0800             Subjective Comments    Subjective Comments Pt states her leg has gotten a lot better, doesn't have pain in it anymore. Still has some R hip pain but the pain hasn't changed in her low back.  -KG              Subjective Pain    Able to rate subjective pain? yes  -KG      Pre-Treatment Pain Level 4  -KG      Post-Treatment Pain Level 2  -KG              Exercise 1    Exercise Name 1 Seated HS S  -KG      Reps 1 2  -KG      Time 1 30 sec hold  -KG              Exercise 2    Exercise Name 2 Seated sciatic nerve glide  -KG      Sets 2 1  -KG      Reps 2 3 (10 AP ea)  -KG              Exercise 3    Exercise Name 3 Seated figure 4 S  -KG      Reps 3 2  -KG      Time 3 30 sec hold  -KG              Exercise 4    Exercise Name 4 Lumbar/BLE strength & ROM measurements  -KG              Exercise 5    Exercise Name 5 Multifidi step ups with TA  -KG      Cueing 5 Verbal  -KG      Sets 5 1  -KG      Reps 5 15 ea LE  -KG      Additional Comments 4\" step  -KG              Exercise 6    Exercise Name 6 lateral step up wtih opp hip abd SLR with TA  -KG      Cueing 6 Verbal  -KG      Sets 6 1  -KG      Reps 6 15 ea LE  -KG      Additional Comments 4\" step  -KG              Exercise 7    Exercise Name 7 Standing tband pallof press for stability  -KG      Cueing 7 Verbal  -KG      Sets 7 1  -KG      Reps 7 15 L, 10 R  -KG      Additional Comments red tband  -KG              Exercise 8    Exercise Name 8 Quadruped PN education  -KG              Exercise 9    Exercise Name 9 Quadruped alt UE with TA  -KG      Cueing 9 Verbal  -KG      Sets 9 1  -KG      Reps 9 10  -KG      Additional Comments over yellow pball  -KG              Exercise 10    Exercise Name 10 Quadruped alt LE  -KG      Cueing 10 Verbal;Tactile  -KG      Sets 10 1  -KG      Reps 10 10  -KG  " "    Additional Comments over yellow pball  -KG              Exercise 11    Exercise Name 11 HL piriformis S  -KG      Reps 11 2  -KG      Time 11 30\" hold  -KG              Exercise 12    Exercise Name 12 Supine SKTC S  -KG      Reps 12 1  -KG      Time 12 30\" hold  -KG              Exercise 13    Exercise Name 13 Bridges + add squeeze  -KG      Cueing 13 Verbal  -KG      Sets 13 1  -KG      Reps 13 15  -KG      Time 13 3-5\" hold  -KG            User Key  (r) = Recorded By, (t) = Taken By, (c) = Cosigned By    Initials Name Provider Type    LAURENT Nahomy Huynh, PT Physical Therapist                         Manual Rx (last 36 hours)     Manual Treatments     Row Name 06/06/22 0800             Manual Rx 1    Manual Rx 1 Location LLE/QL LAD; L hip flexor, R hamstring MET  -KG              Manual Rx 2    Manual Rx 2 Location L sidelying: R>L lumbar parapsinals, R posterior hip musculature/piriformis  -KG      Manual Rx 2 Type STM/MFR  -KG            User Key  (r) = Recorded By, (t) = Taken By, (c) = Cosigned By    Initials Name Provider Type    LAURENT Nahomy Huynh, PT Physical Therapist                 PT OP Goals     Row Name 06/06/22 0800          PT Short Term Goals    STG Date to Achieve 05/16/22  -KG     STG 1 Demonstrate independence/compliance in performance of initial HEP  -KG     STG 1 Progress Met;Ongoing  -KG     STG 2 Demonstrate improved seated PN/postural positioning with seated core stabilization activities with minimal cues required to correct  -KG     STG 2 Progress Met;Ongoing  -KG     STG 3 Demonstrate independence in isometric TA contraction in various positions for functional carryover into daily activity performance  -KG     STG 3 Progress Met  -KG     STG 4 Demonstrate improved lumbar flex AROM fingertips to mid shin without increased pain  -KG     STG 4 Progress Met  -KG            Long Term Goals    LTG Date to Achieve 06/27/22  -KG     LTG 1 Improve modified oswestry score 30% to or less  -KG "     LTG 1 Progress Met  -KG     LTG 2 Subjectively report 50% improvement or greater in RLE radicular symptoms with housework and daily activities  -KG     LTG 2 Progress Met  -KG     LTG 3 Tolerate 15-20 minutes or greater of standing stabilization/strengthening activities without increased low back/RLE pain & symptoms  -KG     LTG 3 Progress Ongoing  -KG     LTG 4 Demonstrate improved R ankle DF MMT to 4+/5 or greater  -KG     LTG 4 Progress Met  -KG     LTG 5 Demonstrate improved R knee flex/ext MMT to 5/5  -KG     LTG 5 Progress Met  -KG     LTG 6 Demonstrate proper lifting/body mechanics when lifting lightly weighted crate from ~8” stool <> waist without increased pain  -KG     LTG 6 Progress Ongoing  -KG            Time Calculation    PT Goal Re-Cert Due Date 06/10/22  -KG           User Key  (r) = Recorded By, (t) = Taken By, (c) = Cosigned By    Initials Name Provider Type    KG Nahomy Huynh, PT Physical Therapist                Therapy Education  Given: Symptoms/condition management, HEP, Pain management, Posture/body mechanics  Program: Reinforced  How Provided: Verbal, Demonstration, Written  Provided to: Patient  Level of Understanding: Teach back education performed, Verbalized, Demonstrated    Outcome Measure Options: Lower Extremity Functional Scale (LEFS), Modified Oswestry  Modified Oswestry  Modified Oswestry Score/Comments: 10 = 20%      Time Calculation:   Start Time: 0850  Stop Time: 0936  Time Calculation (min): 46 min  Therapy Charges for Today     Code Description Service Date Service Provider Modifiers Qty    77687086407 HC PT THER PROC EA 15 MIN 6/6/2022 Nahomy Huynh, PT GP 2    10975804192 HC PT MANUAL THERAPY EA 15 MIN 6/6/2022 Nahomy Huynh, PT GP 1          PT G-Codes  Outcome Measure Options: Lower Extremity Functional Scale (LEFS), Modified Oswestry  Modified Oswestry Score/Comments: 10 = 20%         Nahomy Huynh PT  6/7/2022

## 2022-06-08 ENCOUNTER — HOSPITAL ENCOUNTER (OUTPATIENT)
Dept: PHYSICAL THERAPY | Facility: HOSPITAL | Age: 77
Setting detail: THERAPIES SERIES
Discharge: HOME | End: 2022-06-08

## 2022-06-08 DIAGNOSIS — G89.29 CHRONIC BILATERAL LOW BACK PAIN WITHOUT SCIATICA: Primary | ICD-10-CM

## 2022-06-08 DIAGNOSIS — M54.50 CHRONIC BILATERAL LOW BACK PAIN WITHOUT SCIATICA: Primary | ICD-10-CM

## 2022-06-08 PROCEDURE — 97140 MANUAL THERAPY 1/> REGIONS: CPT

## 2022-06-08 PROCEDURE — 97110 THERAPEUTIC EXERCISES: CPT

## 2022-06-08 NOTE — THERAPY TREATMENT NOTE
Outpatient Physical Therapy Ortho Treatment Note  [facility]     Patient Name: Benedict Gomez  : 1945  MRN: [id number]  Today's Date: 2022      Visit Date: 2022    Attendance: 14 visits  Subjective improvement: 20% improvement  MD appt: PRN; neuro   Recheck due:     Visit Dx:    ICD-10-CM ICD-9-CM   1. Chronic bilateral low back pain without sciatica  M54.50 724.2    G89.29 338.29       Patient Active Problem List   Diagnosis   • Dyspnea on exertion        Past Medical History:   Diagnosis Date   • Hypertension    • Spinal stenosis of lumbar region         Past Surgical History:   Procedure Laterality Date   • CHOLECYSTECTOMY     • ECTOPIC PREGNANCY SURGERY     • HYSTERECTOMY          PT Ortho     Row Name 22       Subjective Comments    Subjective Comments pt states that she is wanting to be put on hold and will go back to MD on the  and will let us know what they say  -KM       Precautions and Contraindications    Precautions/Limitations no known precautions/limitations  -KM       Subjective Pain    Able to rate subjective pain? yes  -KM    Pre-Treatment Pain Level 6  -KM       Posture/Observations    Posture/Observations Comments alignment = today  -KM          User Key  (r) = Recorded By, (t) = Taken By, (c) = Cosigned By    Initials Name Provider Type    Melodie Adler PTA Physical Therapist Assistant                             PT Assessment/Plan     Row Name 22          PT Assessment    Assessment Comments pt able to paritally meet goals set at IE but does not have consistent releif and wishes to be placed on hold until she see MD due to possible sx. Pt has comprehensive HEP established and verbalizes understanding.  -KM            PT Plan    PT Plan Comments placing pt on hold until after MD apt and will call us with results.  -KM           User Key  (r) = Recorded By, (t) = Taken By, (c) = Cosigned By    Initials Name  "Provider Type    MARCIA Fine Melodienitish BURCH PTA Physical Therapist Assistant                 Modalities     Row Name 06/08/22 0800             Moist Heat    Patient denies application of MH Yes  -KM            User Key  (r) = Recorded By, (t) = Taken By, (c) = Cosigned By    Initials Name Provider Type    Melodie AdlerMADELAINE Physical Therapist Assistant               OP Exercises     Row Name 06/08/22 0800             Subjective Comments    Subjective Comments pt states that she is wanting to be put on hold and will go back to MD on the 20th and will let us know what they say  -KM              Subjective Pain    Able to rate subjective pain? yes  -KM      Pre-Treatment Pain Level 6  -KM      Post-Treatment Pain Level --  \"some better\"  -KM              Exercise 1    Exercise Name 1 Seated HS S  -KM      Reps 1 2  -KM      Time 1 30 sec hold  -KM              Exercise 2    Exercise Name 2 Seated sciatic nerve glide  -KM      Sets 2 1  -KM      Reps 2 3 (10 AP ea)  -KM              Exercise 3    Exercise Name 3 Seated figure 4 S  -KM      Reps 3 2  -KM      Time 3 30 sec hold  -KM              Exercise 4    Exercise Name 4 Multifidus step ups  -KM      Sets 4 1  -KM      Reps 4 15 ea LE  -KM      Additional Comments 4 inch  -KM              Exercise 5    Exercise Name 5 Lateral step ups + opp hip abd  -KM      Sets 5 1  -KM      Reps 5 15 ea LE  -KM      Additional Comments 4 inch  -KM              Exercise 6    Exercise Name 6 Standing tband pallof press  -KM      Sets 6 1  -KM      Reps 6 15 ea direction  -KM      Additional Comments red tband  -KM              Exercise 7    Exercise Name 7 Dynadisc TA/PN + LAQ  -KM      Sets 7 2  -KM      Reps 7 10  -KM              Exercise 8    Exercise Name 8 Dynadisc TA/PN + march  -KM      Sets 8 2  -KM      Reps 8 10  -KM              Exercise 9    Exercise Name 9 Supine SKTC S  -KM      Reps 9 2  -KM      Time 9 30 sec hold  -KM              Exercise 10    " Exercise Name 10 HL piriformis S  -KM      Reps 10 2  -KM      Time 10 30 sec hold  -KM              Exercise 11    Exercise Name 11 Bridges + iso add squeezes  -KM      Sets 11 2  -KM      Reps 11 10  -KM      Time 11 5 sec hold  -KM              Exercise 12    Exercise Name 12 verbal review of comprehensive HEP  -KM            User Key  (r) = Recorded By, (t) = Taken By, (c) = Cosigned By    Initials Name Provider Type    Melodie Adler PTA Physical Therapist Assistant                         Manual Rx (last 36 hours)     Manual Treatments     Row Name 06/08/22 0900             Manual Rx 1    Manual Rx 1 Location BLE  -KM      Manual Rx 1 Type LAD- seperately  -KM              Manual Rx 2    Manual Rx 2 Location L sidelying: R>L lumbar parapsinals, R posterior hip musculature/piriformis  -KM      Manual Rx 2 Type STM/MFR  -KM            User Key  (r) = Recorded By, (t) = Taken By, (c) = Cosigned By    Initials Name Provider Type    Melodie Adler PTA Physical Therapist Assistant                 PT OP Goals     Row Name 06/08/22 0800          PT Short Term Goals    STG Date to Achieve 05/16/22  -KM     STG 1 Demonstrate independence/compliance in performance of initial HEP  -KM     STG 1 Progress Met  -KM     STG 2 Demonstrate improved seated PN/postural positioning with seated core stabilization activities with minimal cues required to correct  -KM     STG 2 Progress Met  -KM     STG 3 Demonstrate independence in isometric TA contraction in various positions for functional carryover into daily activity performance  -KM     STG 3 Progress Met  -KM     STG 4 Demonstrate improved lumbar flex AROM fingertips to mid shin without increased pain  -KM     STG 4 Progress Met  -KM            Long Term Goals    LTG Date to Achieve 06/27/22  -KM     LTG 1 Improve modified oswestry score 30% to or less  -KM     LTG 1 Progress Met  -KM     LTG 2 Subjectively report 50% improvement or greater in RLE  radicular symptoms with housework and daily activities  -KM     LTG 2 Progress Met  -KM     LTG 3 Tolerate 15-20 minutes or greater of standing stabilization/strengthening activities without increased low back/RLE pain & symptoms  -KM     LTG 3 Progress Partially Met  -KM     LTG 4 Demonstrate improved R ankle DF MMT to 4+/5 or greater  -KM     LTG 4 Progress Met  -KM     LTG 5 Demonstrate improved R knee flex/ext MMT to 5/5  -KM     LTG 5 Progress Met  -KM     LTG 6 Demonstrate proper lifting/body mechanics when lifting lightly weighted crate from ~8” stool <> waist without increased pain  -KM     LTG 6 Progress Not Met  -KM            Time Calculation    PT Goal Re-Cert Due Date 06/10/22  -KM           User Key  (r) = Recorded By, (t) = Taken By, (c) = Cosigned By    Initials Name Provider Type    Melodie dAler PTA Physical Therapist Assistant                Therapy Education  Given: Symptoms/condition management, HEP, Pain management, Posture/body mechanics  Program: Reinforced  How Provided: Verbal, Demonstration, Written  Provided to: Patient  Level of Understanding: Teach back education performed, Verbalized, Demonstrated              Time Calculation:   Start Time: 0846  Stop Time: 0932  Time Calculation (min): 46 min  Therapy Charges for Today     Code Description Service Date Service Provider Modifiers Qty    12299600507 HC PT THER PROC EA 15 MIN 6/8/2022 Melodie Fine PTA GP, CQ 2    [id number] HC PT MANUAL THERAPY EA 15 MIN 6/8/2022 Melodie Fine PTA GP, CQ 1                    Melodie Fine PTA  6/8/2022

## 2022-09-12 ENCOUNTER — OFFICE VISIT (OUTPATIENT)
Dept: FAMILY MEDICINE CLINIC | Facility: CLINIC | Age: 77
End: 2022-09-12

## 2022-09-12 VITALS
HEART RATE: 57 BPM | HEIGHT: 62 IN | BODY MASS INDEX: 31.94 KG/M2 | DIASTOLIC BLOOD PRESSURE: 72 MMHG | RESPIRATION RATE: 16 BRPM | WEIGHT: 173.6 LBS | SYSTOLIC BLOOD PRESSURE: 140 MMHG | OXYGEN SATURATION: 98 % | TEMPERATURE: 97.8 F

## 2022-09-12 DIAGNOSIS — E53.8 VITAMIN B12 DEFICIENCY: ICD-10-CM

## 2022-09-12 DIAGNOSIS — R73.9 ELEVATED BLOOD SUGAR: ICD-10-CM

## 2022-09-12 DIAGNOSIS — I10 ESSENTIAL HYPERTENSION: ICD-10-CM

## 2022-09-12 DIAGNOSIS — R41.3 MEMORY LOSS: ICD-10-CM

## 2022-09-12 DIAGNOSIS — Z00.00 MEDICARE ANNUAL WELLNESS VISIT, SUBSEQUENT: ICD-10-CM

## 2022-09-12 DIAGNOSIS — Z12.31 SCREENING MAMMOGRAM FOR BREAST CANCER: ICD-10-CM

## 2022-09-12 DIAGNOSIS — E55.9 VITAMIN D DEFICIENCY: ICD-10-CM

## 2022-09-12 DIAGNOSIS — E78.2 MIXED HYPERLIPIDEMIA: Primary | ICD-10-CM

## 2022-09-12 DIAGNOSIS — Z91.81 AT MODERATE RISK FOR FALL: ICD-10-CM

## 2022-09-12 DIAGNOSIS — R53.83 FATIGUE, UNSPECIFIED TYPE: ICD-10-CM

## 2022-09-12 LAB
BILIRUB BLD-MCNC: NEGATIVE MG/DL
CLARITY, POC: CLEAR
COLOR UR: YELLOW
GLUCOSE UR STRIP-MCNC: NEGATIVE MG/DL
KETONES UR QL: NEGATIVE
LEUKOCYTE EST, POC: ABNORMAL
NITRITE UR-MCNC: NEGATIVE MG/ML
PH UR: 6 [PH] (ref 5–8)
PROT UR STRIP-MCNC: NEGATIVE MG/DL
RBC # UR STRIP: ABNORMAL /UL
SP GR UR: 1.02 (ref 1–1.03)
UROBILINOGEN UR QL: NORMAL

## 2022-09-12 PROCEDURE — 1170F FXNL STATUS ASSESSED: CPT | Performed by: FAMILY MEDICINE

## 2022-09-12 PROCEDURE — 81003 URINALYSIS AUTO W/O SCOPE: CPT | Performed by: FAMILY MEDICINE

## 2022-09-12 PROCEDURE — G0439 PPPS, SUBSEQ VISIT: HCPCS | Performed by: FAMILY MEDICINE

## 2022-09-12 PROCEDURE — 1159F MED LIST DOCD IN RCRD: CPT | Performed by: FAMILY MEDICINE

## 2022-09-12 PROCEDURE — 1126F AMNT PAIN NOTED NONE PRSNT: CPT | Performed by: FAMILY MEDICINE

## 2022-09-12 RX ORDER — HYDROCODONE BITARTRATE AND ACETAMINOPHEN 7.5; 325 MG/1; MG/1
1 TABLET ORAL EVERY 6 HOURS PRN
COMMUNITY
Start: 2022-07-06

## 2022-09-12 NOTE — PROGRESS NOTES
The ABCs of the Annual Wellness Visit  Subsequent Medicare Wellness Visit    Chief Complaint   Patient presents with   • Medicare Wellness-subsequent     Fasting      Subjective    History of Present Illness:  Benedict Gomez is a 77 y.o. female who presents for a Subsequent Medicare Wellness Visit.    The following portions of the patient's history were reviewed and   updated as appropriate: allergies, current medications, past family history, past medical history, past social history, past surgical history and problem list.    Compared to one year ago, the patient feels her physical   health is better.    Compared to one year ago, the patient feels her mental   health is better.    Recent Hospitalizations:  She was admitted to [facility] for back surgery 2 months ago hospital.       Current Medical Providers:  Patient Care Team:  Nikita Sahu MD as PCP - General (Family Medicine)    Outpatient Medications Prior to Visit   Medication Sig Dispense Refill   • atorvastatin (LIPITOR) 20 MG tablet Take 1 tablet by mouth Every Night. 90 tablet 3   • Cholecalciferol (Vitamin D3) 25 MCG (1000 UT) capsule Take 1 each by mouth Daily.     • HYDROcodone-acetaminophen (NORCO) 7.5-325 MG per tablet Take 1 tablet by mouth Every 6 (Six) Hours As Needed.     • hydroxychloroquine (PLAQUENIL) 200 MG tablet Take 200 mg by mouth 2 (Two) Times a Day.     • losartan-hydrochlorothiazide (HYZAAR) 50-12.5 MG per tablet Take 1 tablet by mouth Daily.     • meloxicam (MOBIC) 7.5 MG tablet Take 1 tablet by mouth Every Other Day.     • multivitamin with minerals tablet tablet Take 1 tablet by mouth Daily.     • pilocarpine (SALAGEN) 5 MG tablet Take 5 mg by mouth Daily.     • Probiotic Product (PureSignCo PO) Take 1 tablet by mouth Daily.     • vitamin B-12 (CYANOCOBALAMIN) 1000 MCG tablet Take 1 tablet by mouth Daily. 90 tablet 3     No facility-administered medications prior to visit.       Opioid  "medication/s are on active medication list.  and I have evaluated her active treatment plan and pain score trends (see table).  Vitals:    09/12/22 0820   PainSc: 0-No pain     I have reviewed the chart for potential of high risk medication and harmful drug interactions in the elderly.            Aspirin is not on active medication list.  Aspirin use is not indicated based on review of current medical condition/s. Risk of harm outweighs potential benefits.  .    Patient Active Problem List   Diagnosis   • Dyspnea on exertion     Advance Care Planning  Advance Directive is not on file.  ACP discussion was declined by the patient. Patient does not have an advance directive, declines further assistance.    Review of Systems   Respiratory: Negative for shortness of breath.    Cardiovascular: Negative for chest pain and leg swelling.   Gastrointestinal:        Cologuard -2021   Musculoskeletal: Positive for arthralgias and back pain.        Back surgery 2 months ago-improved-spinal stenosis--physical therapy recommended after neurosurgeon releases her for strengthening of core spinal muscles   All other systems reviewed and are negative.       Objective    Vitals:    09/12/22 0820   BP: 140/72   BP Location: Left arm   Patient Position: Sitting   Cuff Size: Large Adult   Pulse: 57   Resp: 16   Temp: 97.8 °F (36.6 °C)   TempSrc: Temporal   SpO2: 98%   Weight: 78.7 kg (173 lb 9.6 oz)   Height: 157.5 cm (62\")   PainSc: 0-No pain     Estimated body mass index is 31.75 kg/m² as calculated from the following:    Height as of this encounter: 157.5 cm (62\").    Weight as of this encounter: 78.7 kg (173 lb 9.6 oz).    BMI is >= 30 and <35. (Class 1 Obesity). The following options were offered after discussion;: exercise counseling/recommendations      Does the patient have evidence of cognitive impairment? No    Physical Exam  Vitals and nursing note reviewed. Exam conducted with a chaperone present.   Constitutional:       " Appearance: She is obese.   HENT:      Right Ear: Tympanic membrane and ear canal normal.      Left Ear: Tympanic membrane and ear canal normal.   Eyes:      Extraocular Movements: Extraocular movements intact.      Pupils: Pupils are equal, round, and reactive to light.   Neck:      Vascular: No carotid bruit.   Cardiovascular:      Rate and Rhythm: Normal rate and regular rhythm.      Pulses: Normal pulses.      Heart sounds: Normal heart sounds.   Pulmonary:      Effort: Pulmonary effort is normal.      Breath sounds: Normal breath sounds.   Abdominal:      General: Abdomen is flat.      Palpations: Abdomen is soft. There is no mass.   Genitourinary:     Comments: Surgical past  Musculoskeletal:      Right lower leg: No edema.      Left lower leg: No edema.   Lymphadenopathy:      Cervical: No cervical adenopathy.   Skin:     General: Skin is warm and dry.   Neurological:      General: No focal deficit present.      Mental Status: She is alert and oriented to person, place, and time.   Psychiatric:         Mood and Affect: Mood normal.         Behavior: Behavior normal.         Thought Content: Thought content normal.         Judgment: Judgment normal.                 HEALTH RISK ASSESSMENT    Smoking Status:  Social History     Tobacco Use   Smoking Status Never Smoker   Smokeless Tobacco Never Used     Alcohol Consumption:  Social History     Substance and Sexual Activity   Alcohol Use No     Fall Risk Screen:    STEADI Fall Risk Assessment was completed, and patient is at MODERATE risk for falls. Assessment completed on:9/12/2022    Depression Screening:  PHQ-2/PHQ-9 Depression Screening 9/12/2022   Retired PHQ-9 Total Score -   Retired Total Score -   Little Interest or Pleasure in Doing Things 0-->not at all   Feeling Down, Depressed or Hopeless 0-->not at all   PHQ-9: Brief Depression Severity Measure Score 0       Health Habits and Functional and Cognitive Screening:  Functional & Cognitive Status  9/12/2022   Do you have difficulty preparing food and eating? No   Do you have difficulty bathing yourself, getting dressed or grooming yourself? No   Do you have difficulty using the toilet? No   Do you have difficulty moving around from place to place? No   Do you have trouble with steps or getting out of a bed or a chair? No   Current Diet Well Balanced Diet   Dental Exam Up to date   Eye Exam Up to date   Exercise (times per week) 3 times per week   Current Exercises Include Walking   Do you need help using the phone?  No   Are you deaf or do you have serious difficulty hearing?  No   Do you need help with transportation? Yes   Do you need help shopping? No   Do you need help preparing meals?  No   Do you need help with housework?  No   Do you need help with laundry? No   Do you need help taking your medications? No   Do you need help managing money? No   Do you ever drive or ride in a car without wearing a seat belt? No   Have you felt unusual stress, anger or loneliness in the last month? No   Who do you live with? Spouse   If you need help, do you have trouble finding someone available to you? No   Have you been bothered in the last four weeks by sexual problems? No   Do you have difficulty concentrating, remembering or making decisions? No       Age-appropriate Screening Schedule:  Refer to the list below for future screening recommendations based on patient's age, sex and/or medical conditions. Orders for these recommended tests are listed in the plan section. The patient has been provided with a written plan.    Health Maintenance   Topic Date Due   • ZOSTER VACCINE (2 of 3) 09/15/2022 (Originally 2/1/2018)   • INFLUENZA VACCINE  10/01/2022   • LIPID PANEL  11/22/2022   • DXA SCAN  09/08/2023   • TDAP/TD VACCINES (2 - Td or Tdap) 12/14/2027              Assessment & Plan   CMS Preventative Services Quick Reference  Risk Factors Identified During Encounter  Fall Risk-High or Moderate  The above  risks/problems have been discussed with the patient.  Follow up actions/plans if indicated are seen below in the Assessment/Plan Section.  Pertinent information has been shared with the patient in the After Visit Summary.    Diagnoses and all orders for this visit:    1. Mixed hyperlipidemia (Primary)  -     Comprehensive Metabolic Panel  -     Lipid Panel    2. Memory loss  -     Vitamin B12  -     Folate    3. Fatigue, unspecified type  -     TSH  -     T4, free  -     CBC & Differential    4. Vitamin B12 deficiency    5. Essential hypertension  -     POC Urinalysis Dipstick, Multipro    6. Vitamin D deficiency  -     Vitamin D 25 Hydroxy    7. Elevated blood sugar  -     Hemoglobin A1c  -     POC Urinalysis Dipstick, Multipro    8. Screening mammogram for breast cancer  -     Mammo Screening Digital Tomosynthesis Bilateral With CAD; Future    9. Medicare annual wellness visit, subsequent    10. At moderate risk for fall        Follow Up:   Return in about 6 months (around 3/12/2023).     An After Visit Summary and PPPS were made available to the patient.          I spent 25 minutes caring for Benedict on this date of service. This time includes time spent by me in the following activities:preparing for the visit, reviewing tests, obtaining and/or reviewing a separately obtained history, performing a medically appropriate examination and/or evaluation , counseling and educating the patient/family/caregiver, ordering medications, tests, or procedures and documenting information in the medical record     Plan-above advised bivalent COVID vaccine and flu shot-will need physical therapy after neurosurgeon releases higher

## 2022-09-12 NOTE — PATIENT INSTRUCTIONS
Advance Care Planning and Advance Directives     You make decisions on a daily basis - decisions about where you want to live, your career, your home, your life. Perhaps one of the most important decisions you face is your choice for future medical care. Take time to talk with your family and your healthcare team and start planning today.  Advance Care Planning is a process that can help you:  · Understand possible future healthcare decisions in light of your own experiences  · Reflect on those decision in light of your goals and values  · Discuss your decisions with those closest to you and the healthcare professionals that care for you  · Make a plan by creating a document that reflects your wishes    Surrogate Decision Maker  In the event of a medical emergency, which has left you unable to communicate or to make your own decisions, you would need someone to make decisions for you.  It is important to discuss your preferences for medical treatment with this person while you are in good health.     Qualities of a surrogate decision maker:  • Willing to take on this role and responsibility  • Knows what you want for future medical care  • Willing to follow your wishes even if they don't agree with them  • Able to make difficult medical decisions under stressful circumstances    Advance Directives  These are legal documents you can create that will guide your healthcare team and decision maker(s) when needed. These documents can be stored in the electronic medical record.    · Living Will - a legal document to guide your care if you have a terminal condition or a serious illness and are unable to communicate. States vary by statute in document names/types, but most forms may include one or more of the following:        -  Directions regarding life-prolonging treatments        -  Directions regarding artificially provided nutrition/hydration        -  Choosing a healthcare decision maker        -  Direction  regarding organ/tissue donation    · Durable Power of  for Healthcare - this document names an -in-fact to make medical decisions for you, but it may also allow this person to make personal and financial decisions for you. Please seek the advice of an  if you need this type of document.    **Advance Directives are not required and no one may discriminate against you if you do not sign one.    Medical Orders  Many states allow specific forms/orders signed by your physician to record your wishes for medical treatment in your current state of health. This form, signed in personal communication with your physician, addresses resuscitation and other medical interventions that you may or may not want.      For more information or to schedule a time with a [facility] Advance Care Planning Facilitator contact: [facility] or call [phone] and someone will contact you directly.    Fall Prevention in the Home, Adult  Falls can cause injuries and can affect people from all age groups. There are many simple things that you can do to make your home safe and to help prevent falls. Ask for help when making these changes, if needed.  What actions can I take to prevent falls?  General instructions  · Use good lighting in all rooms. Replace any light bulbs that burn out.  · Turn on lights if it is dark. Use night-lights.  · Place frequently used items in easy-to-reach places. Lower the shelves around your home if necessary.  · Set up furniture so that there are clear paths around it. Avoid moving your furniture around.  · Remove throw rugs and other tripping hazards from the floor.  · Avoid walking on wet floors.  · Fix any uneven floor surfaces.  · Add color or contrast paint or tape to grab bars and handrails in your home. Place contrasting color strips on the first and last steps of stairways.  · When you use a stepladder, make sure that it is completely opened and that the sides are  firmly locked. Have someone hold the ladder while you are using it. Do not climb a closed stepladder.  · Be aware of any and all pets.  What can I do in the bathroom?         · Keep the floor dry. Immediately clean up any water that spills onto the floor.  · Remove soap buildup in the tub or shower on a regular basis.  · Use non-skid mats or decals on the floor of the tub or shower.  · Attach bath mats securely with double-sided, non-slip rug tape.  · If you need to sit down while you are in the shower, use a plastic, non-slip stool.  · Install grab bars by the toilet and in the tub and shower. Do not use towel bars as grab bars.  What can I do in the bedroom?  · Make sure that a bedside light is easy to reach.  · Do not use oversized bedding that drapes onto the floor.  · Have a firm chair that has side arms to use for getting dressed.  What can I do in the kitchen?  · Clean up any spills right away.  · If you need to reach for something above you, use a sturdy step stool that has a grab bar.  · Keep electrical cables out of the way.  · Do not use floor polish or wax that makes floors slippery. If you must use wax, make sure that it is non-skid floor wax.  What can I do in the stairways?  · Do not leave any items on the stairs.  · Make sure that you have a light switch at the top of the stairs and the bottom of the stairs. Have them installed if you do not have them.  · Make sure that there are handrails on both sides of the stairs. Fix handrails that are broken or loose. Make sure that handrails are as long as the stairways.  · Install non-slip stair treads on all stairs in your home.  · Avoid having throw rugs at the top or bottom of stairways, or secure the rugs with carpet tape to prevent them from moving.  · Choose a carpet design that does not hide the edge of steps on the stairway.  · Check any carpeting to make sure that it is firmly attached to the stairs. Fix any carpet that is loose or worn.  What can I  do on the outside of my home?  · Use bright outdoor lighting.  · Regularly repair the edges of walkways and driveways and fix any cracks.  · Remove high doorway thresholds.  · Trim any shrubbery on the main path into your home.  · Regularly check that handrails are securely fastened and in good repair. Both sides of any steps should have handrails.  · Install guardrails along the edges of any raised decks or porches.  · Clear walkways of debris and clutter, including tools and rocks.  · Have leaves, snow, and ice cleared regularly.  · Use sand or salt on walkways during winter months.  · In the garage, clean up any spills right away, including grease or oil spills.  What other actions can I take?  · Wear closed-toe shoes that fit well and support your feet. Wear shoes that have rubber soles or low heels.  · Use mobility aids as needed, such as canes, walkers, scooters, and crutches.  · Review your medicines with your health care provider. Some medicines can cause dizziness or changes in blood pressure, which increase your risk of falling.  Talk with your health care provider about other ways that you can decrease your risk of falls. This may include working with a physical therapist or  to improve your strength, balance, and endurance.  Where to find more information  · Centers for Disease Control and Prevention, SIMIADI: https://www.cdc.gov  · National [place] on Aging: https://xa2xehl.alisha.nih.gov  Contact a health care provider if:  · You are afraid of falling at home.  · You feel weak, drowsy, or dizzy at home.  · You fall at home.  Summary  · There are many simple things that you can do to make your home safe and to help prevent falls.  · Ways to make your home safe include removing tripping hazards and installing grab bars in the bathroom.  · Ask for help when making these changes in your home.  This information is not intended to replace advice given to you by your health care provider. Make sure you  discuss any questions you have with your health care provider.  Document Revised: 2018 Document Reviewed: 2018  Elsevier Patient Education ©  [employer]      Medicare Wellness  Personal Prevention Plan of Service     Date of Office Visit:    Encounter Provider:  Nikita Sahu MD  Place of Service:  [facility]  Patient Name: Benedict Gomez  :  1945    As part of the Medicare Wellness portion of your visit today, we are providing you with this personalized preventive plan of services (PPPS). This plan is based upon recommendations of the [place] Preventive Services Task Force (USPSTF) and the Advisory Committee on Immunization Practices (ACIP).    This lists the preventive care services that should be considered, and provides dates of when you are due. Items listed as completed are up-to-date and do not require any further intervention.    Health Maintenance   Topic Date Due   • ZOSTER VACCINE (2 of 3) 09/15/2022 (Originally 2018)   • COVID-19 Vaccine (4 - Booster for Pfizer series) 10/01/2022 (Originally 2022)   • INFLUENZA VACCINE  10/01/2022   • LIPID PANEL  2022   • DXA SCAN  2023   • ANNUAL WELLNESS VISIT  2023   • COLORECTAL CANCER SCREENING  2027   • TDAP/TD VACCINES (2 - Td or Tdap) 2027   • HEPATITIS C SCREENING  Completed   • Pneumococcal Vaccine 65+  Completed       No orders of the defined types were placed in this encounter.      No follow-ups on file.

## 2022-09-13 DIAGNOSIS — E78.2 MIXED HYPERLIPIDEMIA: Primary | ICD-10-CM

## 2022-09-13 LAB
25(OH)D3+25(OH)D2 SERPL-MCNC: 59.7 NG/ML (ref 30–100)
ALBUMIN SERPL-MCNC: 4.6 G/DL (ref 3.5–5.2)
ALBUMIN/GLOB SERPL: 2.2 G/DL
ALP SERPL-CCNC: 84 U/L (ref 39–117)
ALT SERPL-CCNC: 17 U/L (ref 1–33)
AST SERPL-CCNC: 24 U/L (ref 1–32)
BASOPHILS # BLD AUTO: 0.08 10*3/MM3 (ref 0–0.2)
BASOPHILS NFR BLD AUTO: 1.2 % (ref 0–1.5)
BILIRUB SERPL-MCNC: 0.9 MG/DL (ref 0–1.2)
BUN SERPL-MCNC: 17 MG/DL (ref 8–23)
BUN/CREAT SERPL: 17.5 (ref 7–25)
CALCIUM SERPL-MCNC: 9.9 MG/DL (ref 8.6–10.5)
CHLORIDE SERPL-SCNC: 106 MMOL/L (ref 98–107)
CHOLEST SERPL-MCNC: 176 MG/DL (ref 0–200)
CO2 SERPL-SCNC: 29 MMOL/L (ref 22–29)
CREAT SERPL-MCNC: 0.97 MG/DL (ref 0.57–1)
EGFRCR-CYS SERPLBLD CKD-EPI 2021: 60.3 ML/MIN/1.73
EOSINOPHIL # BLD AUTO: 0.15 10*3/MM3 (ref 0–0.4)
EOSINOPHIL NFR BLD AUTO: 2.3 % (ref 0.3–6.2)
ERYTHROCYTE [DISTWIDTH] IN BLOOD BY AUTOMATED COUNT: 12.9 % (ref 12.3–15.4)
FOLATE SERPL-MCNC: 19 NG/ML (ref 4.78–24.2)
GLOBULIN SER CALC-MCNC: 2.1 GM/DL
GLUCOSE SERPL-MCNC: 105 MG/DL (ref 65–99)
HBA1C MFR BLD: 5.3 % (ref 4.8–5.6)
HCT VFR BLD AUTO: 41.7 % (ref 34–46.6)
HDLC SERPL-MCNC: 52 MG/DL (ref 40–60)
HGB BLD-MCNC: 13.5 G/DL (ref 12–15.9)
IMM GRANULOCYTES # BLD AUTO: 0.01 10*3/MM3 (ref 0–0.05)
IMM GRANULOCYTES NFR BLD AUTO: 0.2 % (ref 0–0.5)
LDLC SERPL CALC-MCNC: 105 MG/DL (ref 0–100)
LYMPHOCYTES # BLD AUTO: 1.78 10*3/MM3 (ref 0.7–3.1)
LYMPHOCYTES NFR BLD AUTO: 27.8 % (ref 19.6–45.3)
MCH RBC QN AUTO: 29.9 PG (ref 26.6–33)
MCHC RBC AUTO-ENTMCNC: 32.4 G/DL (ref 31.5–35.7)
MCV RBC AUTO: 92.5 FL (ref 79–97)
MONOCYTES # BLD AUTO: 0.55 10*3/MM3 (ref 0.1–0.9)
MONOCYTES NFR BLD AUTO: 8.6 % (ref 5–12)
NEUTROPHILS # BLD AUTO: 3.84 10*3/MM3 (ref 1.7–7)
NEUTROPHILS NFR BLD AUTO: 59.9 % (ref 42.7–76)
NRBC BLD AUTO-RTO: 0 /100 WBC (ref 0–0.2)
PLATELET # BLD AUTO: 221 10*3/MM3 (ref 140–450)
POTASSIUM SERPL-SCNC: 4.1 MMOL/L (ref 3.5–5.2)
PROT SERPL-MCNC: 6.7 G/DL (ref 6–8.5)
RBC # BLD AUTO: 4.51 10*6/MM3 (ref 3.77–5.28)
SODIUM SERPL-SCNC: 145 MMOL/L (ref 136–145)
T4 FREE SERPL-MCNC: 1.38 NG/DL (ref 0.93–1.7)
TRIGL SERPL-MCNC: 102 MG/DL (ref 0–150)
TSH SERPL DL<=0.005 MIU/L-ACNC: 1.88 UIU/ML (ref 0.27–4.2)
VIT B12 SERPL-MCNC: 771 PG/ML (ref 211–946)
VLDLC SERPL CALC-MCNC: 19 MG/DL (ref 5–40)
WBC # BLD AUTO: 6.41 10*3/MM3 (ref 3.4–10.8)

## 2022-09-13 RX ORDER — ATORVASTATIN CALCIUM 40 MG/1
40 TABLET, FILM COATED ORAL NIGHTLY
Qty: 90 TABLET | Refills: 3 | Status: SHIPPED | OUTPATIENT
Start: 2022-09-13

## 2022-09-20 RX ORDER — LOSARTAN POTASSIUM AND HYDROCHLOROTHIAZIDE 12.5; 5 MG/1; MG/1
TABLET ORAL
Qty: 90 TABLET | Refills: 3 | Status: SHIPPED | OUTPATIENT
Start: 2022-09-20

## 2022-09-20 NOTE — TELEPHONE ENCOUNTER
Rx Refill Note  Requested Prescriptions     Pending Prescriptions Disp Refills   • losartan-hydrochlorothiazide (HYZAAR) 50-12.5 MG per tablet [Pharmacy Med Name: LOSARTAN/HCT 50-12.5] 90 tablet 0     Sig: TAKE 1 TABLET BY MOUTH ONCE DAILY IN THE MORNING FOR BLOOD PRESSURE      Last office visit with prescribing clinician: 9/12/2022      Next office visit with prescribing clinician: 3/10/2023       {TIP  Please add Last Relevant Lab 9/12/22    Wanda Clifford MA  09/20/22, 13:02 CDT

## 2022-09-27 ENCOUNTER — FLU SHOT (OUTPATIENT)
Dept: FAMILY MEDICINE CLINIC | Facility: CLINIC | Age: 77
End: 2022-09-27

## 2022-09-27 ENCOUNTER — IMMUNIZATION (OUTPATIENT)
Dept: FAMILY MEDICINE CLINIC | Facility: CLINIC | Age: 77
End: 2022-09-27

## 2022-09-27 DIAGNOSIS — Z23 NEED FOR INFLUENZA VACCINATION: Primary | ICD-10-CM

## 2022-09-27 DIAGNOSIS — Z23 NEED FOR VACCINATION: Primary | ICD-10-CM

## 2022-09-27 PROCEDURE — 0124A COVID-19 (PFIZER) BIVALENT BOOSTER 12+YRS: CPT | Performed by: FAMILY MEDICINE

## 2022-09-27 PROCEDURE — G0008 ADMIN INFLUENZA VIRUS VAC: HCPCS | Performed by: FAMILY MEDICINE

## 2022-09-27 PROCEDURE — 91312 COVID-19 (PFIZER) BIVALENT BOOSTER 12+YRS: CPT | Performed by: FAMILY MEDICINE

## 2022-09-27 PROCEDURE — 90662 IIV NO PRSV INCREASED AG IM: CPT | Performed by: FAMILY MEDICINE

## 2022-10-13 ENCOUNTER — TRANSCRIBE ORDERS (OUTPATIENT)
Dept: PHYSICAL THERAPY | Facility: HOSPITAL | Age: 77
End: 2022-10-13

## 2022-10-13 DIAGNOSIS — M43.16 SPONDYLOLISTHESIS OF LUMBAR REGION: Primary | ICD-10-CM

## 2022-10-19 ENCOUNTER — HOSPITAL ENCOUNTER (OUTPATIENT)
Dept: PHYSICAL THERAPY | Facility: HOSPITAL | Age: 77
Setting detail: THERAPIES SERIES
Discharge: HOME | End: 2022-10-19

## 2022-10-19 DIAGNOSIS — M43.16 SPONDYLOLISTHESIS OF LUMBAR REGION: Primary | ICD-10-CM

## 2022-10-19 PROCEDURE — 97161 PT EVAL LOW COMPLEX 20 MIN: CPT | Performed by: PHYSICAL THERAPIST

## 2022-10-19 PROCEDURE — 97140 MANUAL THERAPY 1/> REGIONS: CPT | Performed by: PHYSICAL THERAPIST

## 2022-10-19 PROCEDURE — 97110 THERAPEUTIC EXERCISES: CPT | Performed by: PHYSICAL THERAPIST

## 2022-10-20 ENCOUNTER — HOSPITAL ENCOUNTER (OUTPATIENT)
Dept: PHYSICAL THERAPY | Facility: HOSPITAL | Age: 77
Setting detail: THERAPIES SERIES
Discharge: HOME | End: 2022-10-20

## 2022-10-20 DIAGNOSIS — M43.16 SPONDYLOLISTHESIS OF LUMBAR REGION: Primary | ICD-10-CM

## 2022-10-20 PROCEDURE — 97110 THERAPEUTIC EXERCISES: CPT | Performed by: PHYSICAL THERAPIST

## 2022-10-20 PROCEDURE — 97140 MANUAL THERAPY 1/> REGIONS: CPT | Performed by: PHYSICAL THERAPIST

## 2022-10-20 NOTE — THERAPY EVALUATION
Outpatient Physical Therapy Ortho Initial Evaluation  [facility]     Patient Name: Benedict Gomez  : 1945  MRN: [id number]  Today's Date: 10/20/2022      Visit Date: 10/19/2022     Attendance: 1 visits  Subjective improvement: N/A  MD appt: 2022  Recheck due: 2022      Patient Active Problem List   Diagnosis   • Dyspnea on exertion        Past Medical History:   Diagnosis Date   • Hypertension    • Spinal stenosis of lumbar region         Past Surgical History:   Procedure Laterality Date   • BACK SURGERY  2022   • CHOLECYSTECTOMY     • ECTOPIC PREGNANCY SURGERY     • HYSTERECTOMY         Visit Dx:     ICD-10-CM ICD-9-CM   1. Spondylolisthesis of lumbar region  M43.16 738.4          Patient History     Row Name 10/19/22 0800             History    Chief Complaint Difficulty Walking;Difficulty with daily activities;Joint stiffness;Pain;Muscle weakness  -KG      Type of Pain Back pain;Hip pain  -KG      Date Current Problem(s) Began --  Chronic; L4-5 fusion 2022  -KG      Brief Description of Current Complaint Pt presents s/p L4-5 fusion on 2022. States she had PT earlier this year. It helped a good deal but she continued with RLE pain. States after her surgery she didn't have anymore RLE pain. States she's still having R sided low back/hip pain. Mostly with standing & walking. States her legs get weak/tired. Lives in single level home with ramp to enter. Lives with . Has 2 steps going down to the den.  -KG      Patient/Caregiver Goals Return to prior level of function;Improve mobility;Improve strength;Know what to do to help the symptoms  -KG      Hand Dominance right-handed  -KG      Occupation/sports/leisure activities Pt enjoys working puzzles, crocheting. Enjoys working in garden (hasn't in a bout 1 year due to pain)  -KG         Pain     Pain Location Back  -KG      Pain at Present 0  -KG      Pain at Best 0  -KG      Pain at Worst 8  -KG       Pain Frequency Intermittent;Several days a week  -KG      Pain Description Aching;Sore  -KG      What Performance Factors Make the Current Problem(s) WORSE? Walking, standing for short periods. Housework activities.  -KG      What Performance Factors Make the Current Problem(s) BETTER? Sitting down/rest  -KG      Is your sleep disturbed? No  -KG         Fall Risk Assessment    Any falls in the past year: Yes  -KG      Number of falls reported in the last 12 months 1; in the summer; RLE gave out on her  -KG      Factors that contributed to the fall: Fatigue  -KG         Services    Are you currently receiving Home Health services No  -KG      Do you plan to receive Home Health services in the near future No  -KG            User Key  (r) = Recorded By, (t) = Taken By, (c) = Cosigned By    Initials Name Provider Type    KG Nahomy Huynh, PT Physical Therapist                 PT Ortho     Row Name 10/19/22 0800       Precautions and Contraindications    Precautions L4-5 fusion  -KG       Subjective Pain    Post-Treatment Pain Level 0  -KG       Posture/Observations    Posture/Observations Comments No acute distress. Some mild gaurding with sit>stand transfer; some pain this date with that. Supine R IC superior vs L, RLE short. More equal after MET/LAD. Decreased overall postural awareness. Rounded shoulders. incision/Scar at lumbar spine has healed well.  -KG       Quarter Clearing    Quarter Clearing Lower Quarter Clearing  -KG       Neural Tension Signs- Lower Quarter Clearing    Slump Bilateral:;Negative  -KG    SLR Bilateral:;Negative  -KG       Sensory Screen for Light Touch- Lower Quarter Clearing    L1 (inguinal area) Bilateral:;Intact  -KG    L2 (anterior mid thigh) Bilateral:;Intact  -KG    L3 (distal anterior thigh) Bilateral:;Intact  -KG    L4 (medial lower leg/foot) Bilateral:;Intact  -KG    L5 (lateral lower leg/great toe) Bilateral:;Intact  -KG       SI/Hip Screen- Lower Quarter Clearing    ASIS  "compression Bilateral:;Negative  -KG    ASIS distraction Bilateral:;Negative  -KG    Tiffani's/Rogelio's test Bilateral:;Negative  -KG    Posterior thigh sheer Bilateral:;Negative  -KG       Special Tests/Palpation    Special Tests/Palpation Lumbar/SI  -KG       Lumbosacral Palpation    Lumbosacral Palpation? Yes  -KG    Erector Spinae (Paraspinals) Bilateral:;Tender;Guarded/taut  -KG    Lumbosacral Palpation Comments TTP at R>L lumbar parapsinals and near top of R IC/QL area. Mild TTP at R post hip.  -KG       General ROM    Head/Neck/Trunk Trunk Extension;Trunk Flexion;Trunk Lt Lateral Flexion;Trunk Rt Lateral Flexion  -KG       Head/Neck/Trunk    Trunk Extension AROM 50% full range wtih mild low back pain  -KG    Trunk Flexion AROM Fingertips 2\" below knee jt line, no pain  -KG    Trunk Lt Lateral Flexion AROM WFL with tighness reported on R  -KG    Trunk Rt Lateral Flexion AROM WFL with no pain  -KG    Head/Neck/Trunk Comments Gentle trunk AROM performed  -KG       MMT (Manual Muscle Testing)    Rt Lower Ext Rt Hip Flexion;Rt Hip ABduction;Rt Hip ADduction;Rt Knee Extension;Rt Knee Flexion;Rt Ankle Plantarflexion;Rt Ankle Dorsiflexion  -KG    Lt Lower Ext Lt Hip Flexion;Lt Hip ABduction;Lt Hip ADduction;Lt Knee Extension;Lt Knee Flexion;Lt Ankle Plantarflexion;Lt Ankle Dorsiflexion  -KG    General MMT Comments Mild limitations in passive hip IR on R>L  -KG       MMT Right Lower Ext    Rt Hip Flexion MMT, Gross Movement (4+/5) good plus  -KG    Rt Hip ABduction MMT, Gross Movement (4/5) good  -KG    Rt Hip ADduction MMT, Gross Movement (4+/5) good plus  -KG    Rt Knee Extension MMT, Gross Movement (5/5) normal  -KG    Rt Knee Flexion MMT, Gross Movement (5/5) normal  -KG    Rt Ankle Plantarflexion MMT, Gross Movement (5/5) normal  -KG    Rt Ankle Dorsiflexion MMT, Gross Movement (5/5) normal  -KG       MMT Left Lower Ext    Lt Hip Flexion MMT, Gross Movement (5/5) normal  -KG    Lt Hip ABduction MMT, Gross Movement " (4+/5) good plus  -KG    Lt Hip ADduction MMT, Gross Movement (5/5) normal  -KG    Lt Knee Extension MMT, Gross Movement (5/5) normal  -KG    Lt Knee Flexion MMT, Gross Movement (5/5) normal  -KG    Lt Ankle Plantarflexion MMT, Gross Movement (5/5) normal  -KG    Lt Ankle Dorsiflexion MMT, Gross Movement (5/5) normal  -KG       Sensation    Sensation WNL? WFL  -KG    Light Touch No apparent deficits  -KG       Flexibility    Flexibility Tested? Lower Extremity  -KG       Lower Extremity Flexibility    Hamstrings Bilateral:;Moderately limited  -KG    Hip Flexors Bilateral:;Mildly limited  -KG    Hip External Rotators Bilateral:;Mildly limited  -KG    Hip Internal Rotators Bilateral:;Mildly limited  -KG       Transfers    Comment, (Transfers) Cues for log roll with sup<>sit transfer  -KG       Gait/Stairs (Locomotion)    Comment, (Gait/Stairs) Ambulates with no AD, non-antalgic gait.  -KG          User Key  (r) = Recorded By, (t) = Taken By, (c) = Cosigned By    Initials Name Provider Type    KG Nahomy Huynh, PT Physical Therapist                            Therapy Education  Education Details: POC education. HEP: see exercise flowsheet for details  Given: HEP, Symptoms/condition management, Pain management, Posture/body mechanics  Program: New  How Provided: Verbal, Demonstration, Written  Provided to: Patient  Level of Understanding: Teach back education performed, Verbalized, Demonstrated      PT OP Goals     Row Name 10/19/22 0900          PT Short Term Goals    STG Date to Achieve 11/09/22  -KG     STG 1 Demonstrate independence/compliance in performance of initial HEP  -KG     STG 2 Demonstrate improved seated PN/postural positioning with seated core stabilization activities with minimal cues required to correct  -KG     STG 3 Demonstrate independence in performance of isometric TA contraction in various positions for functional carryover into daily activity performance  -KG        Long Term Goals    LTG  Date to Achieve 11/30/22  -KG     LTG 1 Subjectively report 50% improvement or greater in pain symptoms and tolerance to housework and daily activities  -KG     LTG 2 improve modified oswestry score to 20% or less  -KG     LTG 3 Demonstate improved bilatearl hip MMT to 5/5 in all planes  -KG     LTG 4 Tolerate 15-20 minutes or greater of standing stabilization/strengthening activities without increased low back/R hip pain & symptoms  -KG     LTG 6 Demonstrate proper lifting/body mechanics when lifting lightweight box/crate from 6” stool <> waist without increased pain  -KG        Time Calculation    PT Goal Re-Cert Due Date 11/09/22  -KG           User Key  (r) = Recorded By, (t) = Taken By, (c) = Cosigned By    Initials Name Provider Type    KG Nahomy Huynh, PT Physical Therapist                 PT Assessment/Plan     Row Name 10/19/22 0900          PT Assessment    Functional Limitations Limitation in home management;Limitations in community activities;Performance in leisure activities;Performance in self-care ADL  -KG     Impairments Impaired flexibility;Impaired muscle endurance;Muscle strength;Pain;Poor body mechanics;Posture;Range of motion  -KG     Assessment Comments Pt is a 77 y.o. female presenting with low back pain & weakness s/p L4-5 fusion on 7/5/2022. Pt reports the RLE radicular pain she was having has resolved since the surgery. Pt demonstrates deficits in overall core/postural stabilityand functional BLE strength. Has pain mainly with weightbearing activities. Does have some post hip tightness R>L. Presented with malalignment at pelvis that seeemed to correct well with MET. Pt did well with initial therex activities for HEP adminstration. Did discuss to continue being cautious with any bending and lifting activities. Pt will benefit from skilled PT services to address these deficits for improvements in BLE functional strength, dymamic core stabiltiy, posture/ awareness, &  "functional activity tolerance.  -KG     Rehab Potential Good  -KG     Patient/caregiver participated in establishment of treatment plan and goals Yes  -KG     Patient would benefit from skilled therapy intervention Yes  -KG        PT Plan    PT Frequency 2x/week  -KG     Predicted Duration of Therapy Intervention (PT) 12 visits  -KG     Physical Therapy Interventions (Optional Details) home exercise program;lumbar stabilization;manual therapy techniques;modalities;neuromuscular re-education;patient/family education;postural re-education;ROM (Range of Motion);strengthening;stretching  -KG     PT Plan Comments Continue working on core stabiltiy and isometric TA activities. LE/low back stretching. Educate on seated posture next visit. Posture/ awareness. Continue to monitor alignement. Manual/soft tissue work to R>L lumbar paraspinals/post hip.  -KG           User Key  (r) = Recorded By, (t) = Taken By, (c) = Cosigned By    Initials Name Provider Type    KG Nahomy Huynh, PT Physical Therapist                   OP Exercises     Row Name 10/19/22 0900             Exercise 1    Exercise Name 1 SKTC stretch  -KG      Cueing 1 Verbal  -KG      Reps 1 2  -KG      Time 1 30\" hold  -KG         Exercise 2    Exercise Name 2 HL piriformis stretch  -KG      Cueing 2 Verbal  -KG      Reps 2 2  -KG      Time 2 30\" hold  -KG         Exercise 3    Exercise Name 3 Isometric TA/kegel/PPT education  -KG      Time 3 4 min  -KG         Exercise 4    Exercise Name 4 HL tband hip abd with TA/kegel  -KG      Cueing 4 Verbal  -KG      Sets 4 1  -KG      Reps 4 10  -KG         Exercise 5    Exercise Name 5 Seated thomas hamstring stretch, foot on stool  -KG      Cueing 5 Verbal  -KG      Reps 5 2  -KG      Time 5 30\" hold  -KG            User Key  (r) = Recorded By, (t) = Taken By, (c) = Cosigned By    Initials Name Provider Type    KG Nahomy Huynh, PT Physical Therapist              Manual Rx (last 36 hours)     Manual " Treatments     Row Name 10/19/22 0900             Manual Rx 1    Manual Rx 1 Location L hamstring, R hip flexor  -KG      Manual Rx 1 Type MET; RLE gentle LAD  -KG      Manual Rx 1 Duration 4 min  -KG         Manual Rx 2    Manual Rx 2 Location L SL: R>L lumbar paraspinals/QL/pos thips  -KG      Manual Rx 2 Type STM/MFR  -KG      Manual Rx 2 Duration 5 min  -KG            User Key  (r) = Recorded By, (t) = Taken By, (c) = Cosigned By    Initials Name Provider Type    KG Nahomy Huynh, PT Physical Therapist                            Outcome Measure Options: Modified Oswestry  Modified Oswestry  Modified Oswestry Score/Comments: 14 = 28%      Time Calculation:   Start Time: 0850  Stop Time: 0930  Time Calculation (min): 40 min  Total Timed Code Minutes- PT: 24 minute(s)     Therapy Charges for Today     Code Description Service Date Service Provider Modifiers Qty    46263801913 HC PT EVAL LOW COMPLEXITY 1 10/19/2022 Nahomy Huynh, PT GP 1    51539107574 HC PT MANUAL THERAPY EA 15 MIN 10/19/2022 Nahomy Huynh, PT GP 1    19485087657 HC PT THER PROC EA 15 MIN 10/19/2022 Nahomy Huynh, PT GP 1          PT G-Codes  Outcome Measure Options: Modified Oswestry  Modified Oswestry Score/Comments: 14 = 28%         Nahomy Huynh PT  10/20/2022

## 2022-10-21 NOTE — THERAPY TREATMENT NOTE
"    Outpatient Physical Therapy Ortho Treatment Note  [facility]     Patient Name: Benedict Gomez  : 1945  MRN: [id number]  Today's Date: 10/20/2022      Visit Date: 10/20/2022     Attendance: 2 visits  Subjective improvement: N/A  MD appt: 2022  Recheck due: 2022    Visit Dx:    ICD-10-CM ICD-9-CM   1. Spondylolisthesis of lumbar region  M43.16 738.4       Patient Active Problem List   Diagnosis   • Dyspnea on exertion        Past Medical History:   Diagnosis Date   • Hypertension    • Spinal stenosis of lumbar region         Past Surgical History:   Procedure Laterality Date   • BACK SURGERY  2022   • CHOLECYSTECTOMY     • ECTOPIC PREGNANCY SURGERY     • HYSTERECTOMY                   PT Ortho     Row Name 10/20/22 0800       Subjective Comments    Subjective Comments Pt states she's feeling good right now, usually does in the mornings. Did a little of her stretching last night.  -KG       Precautions and Contraindications    Precautions L4-5 fusion  -KG       Subjective Pain    Post-Treatment Pain Level --  \"Feels good right now\"  -KG       Posture/Observations    Posture/Observations Comments Supine LLE short but R IC superior vs L. L ASIS superior vs R. Near equal at pelvis after MET and leg length appeared equal.  -KG          User Key  (r) = Recorded By, (t) = Taken By, (c) = Cosigned By    Initials Name Provider Type    Nahomy Boudreaux, PT Physical Therapist                         PT Assessment/Plan     Row Name 10/20/22 0800          PT Assessment    Assessment Comments Pt did well with treatment. Minor cues for PPT/kegel/iso TA contraction but overall did well. Worked/educated on seated posture and finding/maintain more neutral pelvis. Did better seated on stool vs in chair.  -KG        PT Plan    PT Frequency 2x/week  -KG     PT Plan Comments Continue seated PN/core activities. Possibly try gentle bridges. Continue manual and monitoring alignment.  -KG  " "         User Key  (r) = Recorded By, (t) = Taken By, (c) = Cosigned By    Initials Name Provider Type    KG Nahomy Huynh, PT Physical Therapist                   OP Exercises     Row Name 10/20/22 0800             Subjective Pain    Able to rate subjective pain? yes  -KG      Pre-Treatment Pain Level 0  -KG         Exercise 1    Exercise Name 1 Seated thomas hamstring stretch  -KG      Cueing 1 Verbal  -KG      Reps 1 2  -KG      Time 1 30\" hold  -KG         Exercise 2    Exercise Name 2 Seated figure 4 stretch, sit up tall  -KG      Cueing 2 Verbal  -KG      Reps 2 2  -KG      Time 2 30\" hold  -KG         Exercise 3    Exercise Name 3 Seated PN/kegel/TA education  -KG         Exercise 4    Exercise Name 4 Seated PN/TA alt LAQ  -KG      Cueing 4 Verbal  -KG      Sets 4 1  -KG      Reps 4 15  -KG         Exercise 5    Exercise Name 5 Seated PN/TA alt marching  -KG      Cueing 5 Verbal  -KG      Sets 5 1  -KG      Reps 5 15  -KG         Exercise 6    Exercise Name 6 STKC stretch thomas  -KG      Cueing 6 Verbal  -KG      Reps 6 2  -KG      Time 6 30\" hold  -KG         Exercise 7    Exercise Name 7 HL piriformis stretch thomas  -KG      Cueing 7 Verbal  -KG      Reps 7 2  -KG      Time 7 30\" hold  -KG         Exercise 8    Exercise Name 8 HL iso add wtih PPT/kegel.TA  -KG      Cueing 8 Verbal  -KG      Sets 8 1  -KG      Reps 8 15  -KG      Time 8 5\" hold  -KG         Exercise 9    Exercise Name 9 HL tband hip abd  -KG      Cueing 9 Verbal  -KG      Sets 9 2  -KG      Reps 9 10  -KG      Additional Comments red tband  -KG         Exercise 10    Exercise Name 10 SL clamshells with TA  -KG      Cueing 10 Verbal  -KG      Sets 10 1  -KG      Reps 10 15  -KG            User Key  (r) = Recorded By, (t) = Taken By, (c) = Cosigned By    Initials Name Provider Type    KG Nahomy Huynh, PT Physical Therapist                   Manual Rx (last 36 hours)     Manual Treatments     Row Name 10/20/22 0800             Manual Rx 1    " Manual Rx 1 Location L hamstring, R hip flexor  -KG      Manual Rx 1 Type MET performed simultaneously  -KG      Manual Rx 1 Duration 3 min  -KG         Manual Rx 2    Manual Rx 2 Location Seated leaning over mat table:  R>L lumbar paraspinals/QL/pos thips  -KG      Manual Rx 2 Type STM/MFR  -KG      Manual Rx 2 Duration 7 min  -KG            User Key  (r) = Recorded By, (t) = Taken By, (c) = Cosigned By    Initials Name Provider Type    KG Nahomy Huynh, PT Physical Therapist                      PT OP Goals     Row Name 10/20/22 0800          PT Short Term Goals    STG Date to Achieve 11/09/22  -KG     STG 1 Demonstrate independence/compliance in performance of initial HEP  -KG     STG 2 Demonstrate improved seated PN/postural positioning with seated core stabilization activities with minimal cues required to correct  -KG     STG 3 Demonstrate independence in performance of isometric TA contraction in various positions for functional carryover into daily activity performance  -KG        Long Term Goals    LTG Date to Achieve 11/30/22  -KG     LTG 1 Subjectively report 50% improvement or greater in pain symptoms and tolerance to housework and daily activities  -KG     LTG 2 improve modified oswestry score to 20% or less  -KG     LTG 3 Demonstate improved bilatearl hip MMT to 5/5 in all planes  -KG     LTG 4 Tolerate 15-20 minutes or greater of standing stabilization/strengthening activities without increased low back/R hip pain & symptoms  -KG     LTG 6 Demonstrate proper lifting/body mechanics when lifting lightweight box/crate from 6” stool <> waist without increased pain  -KG        Time Calculation    PT Goal Re-Cert Due Date 11/09/22  -KG           User Key  (r) = Recorded By, (t) = Taken By, (c) = Cosigned By    Initials Name Provider Type    KG Nahomy Huynh, PT Physical Therapist                         Therapy Education  Given: HEP, Symptoms/condition management, Pain management, Posture/body  mechanics  Program: Reinforced  How Provided: Verbal, Demonstration  Provided to: Patient  Level of Understanding: Verbalized, Demonstrated              Time Calculation:   Start Time: 0802  Stop Time: 0849  Time Calculation (min): 47 min  Therapy Charges for Today     Code Description Service Date Service Provider Modifiers Qty    03020893233  PT THER PROC EA 15 MIN 10/20/2022 Nahomy Huynh, PT GP 2    [id number]  PT MANUAL THERAPY EA 15 MIN 10/20/2022 Nahomy Huynh, PT GP 1                    Nahomy Huynh, PT  10/20/2022

## 2022-10-25 ENCOUNTER — HOSPITAL ENCOUNTER (OUTPATIENT)
Dept: PHYSICAL THERAPY | Facility: HOSPITAL | Age: 77
Setting detail: THERAPIES SERIES
Discharge: HOME | End: 2022-10-25

## 2022-10-25 DIAGNOSIS — G89.29 CHRONIC BILATERAL LOW BACK PAIN WITHOUT SCIATICA: ICD-10-CM

## 2022-10-25 DIAGNOSIS — M54.50 CHRONIC BILATERAL LOW BACK PAIN WITHOUT SCIATICA: ICD-10-CM

## 2022-10-25 DIAGNOSIS — M43.16 SPONDYLOLISTHESIS OF LUMBAR REGION: Primary | ICD-10-CM

## 2022-10-25 PROCEDURE — 97140 MANUAL THERAPY 1/> REGIONS: CPT

## 2022-10-25 PROCEDURE — 97110 THERAPEUTIC EXERCISES: CPT

## 2022-10-25 NOTE — THERAPY TREATMENT NOTE
Outpatient Physical Therapy Ortho Treatment Note  [facility]     Patient Name: Benedict Gomez  : 1945  MRN: [id number]  Today's Date: 10/25/2022      Visit Date: 10/25/2022    Attendance: 3 visits  Subjective improvement: N/A  MD appt: 22  Recheck due: 22    Visit Dx:    ICD-10-CM ICD-9-CM   1. Spondylolisthesis of lumbar region  M43.16 738.4   2. Chronic bilateral low back pain without sciatica  M54.50 724.2    G89.29 338.29       Patient Active Problem List   Diagnosis   • Dyspnea on exertion        Past Medical History:   Diagnosis Date   • Hypertension    • Spinal stenosis of lumbar region         Past Surgical History:   Procedure Laterality Date   • BACK SURGERY  2022   • CHOLECYSTECTOMY     • ECTOPIC PREGNANCY SURGERY     • HYSTERECTOMY          PT Ortho     Row Name 10/25/22 08       Precautions and Contraindications    Precautions L4-5 fusion  -PM       Posture/Observations    Posture/Observations Comments L IC mildly high; R ASIS inf to L  -PM          User Key  (r) = Recorded By, (t) = Taken By, (c) = Cosigned By    Initials Name Provider Type    PM Naomi Michel PTA Physical Therapist Assistant                             PT Assessment/Plan     Row Name 10/25/22           PT Assessment    Assessment Comments Good effort and participation with PT today; continued cues for [place]/TA and posture. Good response to Rx.  -PM        PT Plan    PT Frequency 2x/week  -PM     PT Plan Comments possibly introduce a little stability/strength  -PM           User Key  (r) = Recorded By, (t) = Taken By, (c) = Cosigned By    Initials Name Provider Type    Naomi Lyons PTA Physical Therapist Assistant                   OP Exercises     Row Name 10/25/22 [facility] [place]             Subjective Comments    Subjective Comments Pt states that she raked leaves Saturday which caused a lot of incr LBP, not radicular though.  -PM         Subjective Pain    Able to  "rate subjective pain? yes  -PM      Pre-Treatment Pain Level 2  -PM      Post-Treatment Pain Level 1  -PM         Exercise 1    Exercise Name 1 Seated thomas hamstring stretch  -PM      Cueing 1 Verbal  -PM      Reps 1 2  -PM      Time 1 30\" hold  -PM         Exercise 2    Exercise Name 2 Seated figure 4 stretch, sit up tall  -PM      Cueing 2 Verbal  -PM      Reps 2 2  -PM      Time 2 30\" hold  -PM         Exercise 3    Exercise Name 3 Seated PN/kegel/TA education  -PM      Cueing 3 Verbal;Demo  -PM      Time 3 2'  -PM         Exercise 4    Exercise Name 4 Seated PN/TA alt LAQ  -PM      Cueing 4 Verbal;Demo  -PM      Sets 4 1  -PM      Reps 4 15  -PM         Exercise 5    Exercise Name 5 Seated PN/TA alt marching  -PM      Cueing 5 Verbal;Demo  -PM      Sets 5 1  -PM      Reps 5 15  -PM         Exercise 6    Exercise Name 6 STKC stretch thomas  -PM      Cueing 6 Verbal  -PM      Reps 6 2 R; 1 L  -PM      Time 6 30\" hold  -PM         Exercise 7    Exercise Name 7 HL piriformis stretch thomas  -PM      Cueing 7 Verbal  -PM      Reps 7 2  -PM      Time 7 30\" hold  -PM         Exercise 8    Exercise Name 8 HL iso add wtih PPT/kegel.TA  -PM      Cueing 8 Verbal  -PM      Sets 8 1  -PM      Reps 8 15  -PM      Time 8 5\" hold  -PM         Exercise 9    Exercise Name 9 HL tband hip abd  -PM      Cueing 9 Verbal  -PM      Sets 9 2  -PM      Reps 9 10  -PM         Exercise 10    Exercise Name 10 SL clamshells with TA  -PM      Cueing 10 Verbal  -PM      Sets 10 1  -PM      Reps 10 15  -PM            User Key  (r) = Recorded By, (t) = Taken By, (c) = Cosigned By    Initials Name Provider Type    PM Naomi Michel, MADELAINE Physical Therapist Assistant                         Manual Rx (last 36 hours)     Manual Treatments     Row Name 10/25/22 0800             Manual Rx 1    Manual Rx 1 Location IS  -PM      Manual Rx 1 Type L LAD; MET R HS / L hip flexor  -PM      Manual Rx 1 Duration 4'  -PM         Manual Rx 2    Manual Rx 2 " Location seated lumbosacral leaning over mat table  -PM      Manual Rx 2 Type MFR/STM  -PM      Manual Rx 2 Duration 6 min  -PM            User Key  (r) = Recorded By, (t) = Taken By, (c) = Cosigned By    Initials Name Provider Type    PM Naomi Michel PTA Physical Therapist Assistant                 PT OP Goals     Row Name 10/25/22 0800          PT Short Term Goals    STG Date to Achieve 11/09/22  -PM     STG 1 Demonstrate independence/compliance in performance of initial HEP  -PM     STG 1 Progress Ongoing;Progressing  -PM     STG 2 Demonstrate improved seated PN/postural positioning with seated core stabilization activities with minimal cues required to correct  -PM     STG 2 Progress Ongoing;Progressing  -PM     STG 3 Demonstrate independence in performance of isometric TA contraction in various positions for functional carryover into daily activity performance  -PM     STG 3 Progress Ongoing;Progressing  -PM        Long Term Goals    LTG Date to Achieve 11/30/22  -PM     LTG 1 Subjectively report 50% improvement or greater in pain symptoms and tolerance to housework and daily activities  -PM     LTG 1 Progress Ongoing  -PM     LTG 2 improve modified oswestry score to 20% or less  -PM     LTG 2 Progress Ongoing  -PM     LTG 3 Demonstate improved bilatearl hip MMT to 5/5 in all planes  -PM     LTG 3 Progress Ongoing  -PM     LTG 4 Tolerate 15-20 minutes or greater of standing stabilization/strengthening activities without increased low back/R hip pain & symptoms  -PM     LTG 4 Progress Ongoing  -PM     LTG 6 Demonstrate proper lifting/body mechanics when lifting lightweight box/crate from 6” stool <> waist without increased pain  -PM     LTG 6 Progress Ongoing  -PM        Time Calculation    PT Goal Re-Cert Due Date 11/09/22  -PM           User Key  (r) = Recorded By, (t) = Taken By, (c) = Cosigned By    Initials Name Provider Type    PM Naomi Michel PTA Physical Therapist Assistant                 Therapy Education  Given: HEP, Symptoms/condition management, Pain management, Posture/body mechanics  Program: Reinforced  How Provided: Verbal, Demonstration  Provided to: Patient  Level of Understanding: Verbalized, Demonstrated              Time Calculation:   Start Time: 0851  Stop Time: 0935  Time Calculation (min): 44 min  Therapy Charges for Today     Code Description Service Date Service Provider Modifiers Qty    72024813825 HC PT THER PROC EA 15 MIN 10/25/2022 Naomi Michel, MADELAINE GP, CQ 2    49901827572 HC PT MANUAL THERAPY EA 15 MIN 10/25/2022 Naomi Michel PTA GP, CQ 1                    Naomi Michel PTA  10/25/2022

## 2022-10-26 ENCOUNTER — APPOINTMENT (OUTPATIENT)
Dept: PHYSICAL THERAPY | Facility: HOSPITAL | Age: 77
End: 2022-10-26

## 2022-10-27 ENCOUNTER — HOSPITAL ENCOUNTER (OUTPATIENT)
Dept: PHYSICAL THERAPY | Facility: HOSPITAL | Age: 77
Setting detail: THERAPIES SERIES
Discharge: HOME | End: 2022-10-27

## 2022-10-27 DIAGNOSIS — M54.50 CHRONIC BILATERAL LOW BACK PAIN WITHOUT SCIATICA: ICD-10-CM

## 2022-10-27 DIAGNOSIS — G89.29 CHRONIC BILATERAL LOW BACK PAIN WITHOUT SCIATICA: ICD-10-CM

## 2022-10-27 DIAGNOSIS — M43.16 SPONDYLOLISTHESIS OF LUMBAR REGION: Primary | ICD-10-CM

## 2022-10-27 PROCEDURE — 97140 MANUAL THERAPY 1/> REGIONS: CPT

## 2022-10-27 PROCEDURE — 97110 THERAPEUTIC EXERCISES: CPT

## 2022-10-27 NOTE — THERAPY TREATMENT NOTE
"    Outpatient Physical Therapy Ortho Treatment Note  [facility]     Patient Name: Benedict Gomez  : 1945  MRN: [id number]  Today's Date: 10/27/2022      Visit Date: 10/27/2022    Attendance: 4 visits  Subjective improvement: \"unsure\"  MD appt: 22  Recheck due: 22    Visit Dx:    ICD-10-CM ICD-9-CM   1. Spondylolisthesis of lumbar region  M43.16 738.4   2. Chronic bilateral low back pain without sciatica  M54.50 724.2    G89.29 338.29       Patient Active Problem List   Diagnosis   • Dyspnea on exertion        Past Medical History:   Diagnosis Date   • Hypertension    • Spinal stenosis of lumbar region         Past Surgical History:   Procedure Laterality Date   • BACK SURGERY  2022   • CHOLECYSTECTOMY     • ECTOPIC PREGNANCY SURGERY     • HYSTERECTOMY          PT Ortho     Row Name 10/27/22 09       Subjective Comments    Subjective Comments Pt states felt good Tues and lasted until late Wed (went to [employer])  -PM       Precautions and Contraindications    Precautions L4-5 fusion  -PM       Subjective Pain    Able to rate subjective pain? yes  -PM    Pre-Treatment Pain Level 4  -PM    Post-Treatment Pain Level 2  -PM       Posture/Observations    Posture/Observations Comments L IC mildly high; R ASIS inf to L  -PM          User Key  (r) = Recorded By, (t) = Taken By, (c) = Cosigned By    Initials Name Provider Type    PM Naomi Michel PTA Physical Therapist Assistant                             PT Assessment/Plan     Row Name 10/27/22 0900          PT Assessment    Assessment Comments Pt did well with treatment today/improving exc performance with cues. Pt noted she felt better after last sexxtion  -PM        PT Plan    PT Frequency 2x/week  -PM     PT Plan Comments cont with standing and seated core; possible gentle pro II for conditioning  -PM           User Key  (r) = Recorded By, (t) = Taken By, (c) = Cosigned By    Initials Name Provider Type    PM " "Naomi Michel PTA Physical Therapist Assistant                   OP Exercises     Row Name 10/27/22 0900             Subjective Comments    Subjective Comments Pt states felt good Tues and lasted until late Wed (went to [employer])  -PM         Subjective Pain    Able to rate subjective pain? yes  -PM      Pre-Treatment Pain Level 4  -PM      Post-Treatment Pain Level 2  -PM         Exercise 1    Exercise Name 1 Seated thomas hamstring stretch  -PM      Cueing 1 Verbal  -PM      Reps 1 2  -PM      Time 1 30\" hold  -PM         Exercise 2    Exercise Name 2 Seated figure 4 stretch, sit up tall  -PM      Cueing 2 Verbal  -PM      Reps 2 2  -PM      Time 2 30\" hold  -PM         Exercise 3    Exercise Name 3 Seated PN/kegel/TA education  -PM      Cueing 3 Verbal;Demo  -PM      Time 3 1'  -PM         Exercise 4    Exercise Name 4 Seated PN/TA alt LAQ  -PM      Cueing 4 Verbal;Demo  -PM      Sets 4 2  -PM      Reps 4 10  -PM         Exercise 5    Exercise Name 5 Seated PN/TA alt marching  -PM      Cueing 5 Verbal;Demo  -PM      Sets 5 2  -PM      Reps 5 10  -PM         Exercise 6    Exercise Name 6 STKC stretch thomas  -PM      Cueing 6 Verbal  -PM      Reps 6 2 R; 1 L  -PM      Time 6 30\" hold  -PM         Exercise 7    Exercise Name 7 HL piriformis stretch thomas  -PM      Cueing 7 Verbal  -PM      Reps 7 2  -PM      Time 7 30\" hold  -PM         Exercise 8    Exercise Name 8 HL iso add wtih PPT/kegel.TA  -PM      Cueing 8 Verbal  -PM      Sets 8 1  -PM      Reps 8 15  -PM      Time 8 5\" hold  -PM         Exercise 9    Exercise Name 9 standing alt hip abd w/TA  -PM      Cueing 9 Verbal  -PM      Sets 9 1  -PM      Reps 9 15  -PM         Exercise 10    Exercise Name 10 SL clamshells with TA  -PM      Cueing 10 Verbal  -PM      Sets 10 1  -PM      Reps 10 15  -PM         Exercise 11    Exercise Name 11 stand mid rows w/posture and TA  -PM      Cueing 11 Verbal  -PM      Sets 11 1  -PM      Reps 11 15  -PM      Additional " Comments YTB  -PM         Exercise 12    Exercise Name 12 stand B alt hip ext gentle w/TA  -PM      Cueing 12 Verbal  -PM      Reps 12 15  -PM            User Key  (r) = Recorded By, (t) = Taken By, (c) = Cosigned By    Initials Name Provider Type    PM Naomi Michel PTA Physical Therapist Assistant                         Manual Rx (last 36 hours)     Manual Treatments     Row Name 10/27/22 0900             Manual Rx 1    Manual Rx 1 Location IS  -PM      Manual Rx 1 Type L LAD; MET R HS / L hip flexor  -PM      Manual Rx 1 Duration 3'  -PM         Manual Rx 2    Manual Rx 2 Location seated lumbosacral leaning over mat table  -PM      Manual Rx 2 Type MFR/STM-IASTM  -PM      Manual Rx 2 Duration 7'  -PM            User Key  (r) = Recorded By, (t) = Taken By, (c) = Cosigned By    Initials Name Provider Type    PM Naomi Michel PTA Physical Therapist Assistant                 PT OP Goals     Row Name 10/27/22 0900          PT Short Term Goals    STG Date to Achieve 11/09/22  -PM     STG 1 Demonstrate independence/compliance in performance of initial HEP  -PM     STG 1 Progress Ongoing;Progressing  -PM     STG 2 Demonstrate improved seated PN/postural positioning with seated core stabilization activities with minimal cues required to correct  -PM     STG 2 Progress Ongoing;Progressing  -PM     STG 3 Demonstrate independence in performance of isometric TA contraction in various positions for functional carryover into daily activity performance  -PM     STG 3 Progress Ongoing;Progressing  -PM        Long Term Goals    LTG Date to Achieve 11/30/22  -PM     LTG 1 Subjectively report 50% improvement or greater in pain symptoms and tolerance to housework and daily activities  -PM     LTG 1 Progress Ongoing  -PM     LTG 2 improve modified oswestry score to 20% or less  -PM     LTG 2 Progress Ongoing  -PM     LTG 3 Demonstate improved bilatearl hip MMT to 5/5 in all planes  -PM     LTG 3 Progress Ongoing  -PM      LTG 4 Tolerate 15-20 minutes or greater of standing stabilization/strengthening activities without increased low back/R hip pain & symptoms  -PM     LTG 4 Progress Ongoing  -PM     LTG 6 Demonstrate proper lifting/body mechanics when lifting lightweight box/crate from 6” stool <> waist without increased pain  -PM     LTG 6 Progress Ongoing  -PM        Time Calculation    PT Goal Re-Cert Due Date 11/09/22  -PM           User Key  (r) = Recorded By, (t) = Taken By, (c) = Cosigned By    Initials Name Provider Type    PM Naomi Michel PTA Physical Therapist Assistant                Therapy Education  Given: HEP, Symptoms/condition management, Pain management, Posture/body mechanics  Program: Reinforced  How Provided: Verbal, Demonstration  Provided to: Patient  Level of Understanding: Verbalized, Demonstrated              Time Calculation:   Start Time: 0935  Stop Time: 1015  Time Calculation (min): 40 min  Therapy Charges for Today     Code Description Service Date Service Provider Modifiers Qty    16823721142 HC PT THER PROC EA 15 MIN 10/27/2022 Naomi Michel PTA GP, CQ 2    27246455963 HC PT MANUAL THERAPY EA 15 MIN 10/27/2022 Naomi Michel PTA GP, CQ 1                    Naomi Michel PTA  10/27/2022

## 2022-11-01 ENCOUNTER — HOSPITAL ENCOUNTER (OUTPATIENT)
Dept: PHYSICAL THERAPY | Facility: HOSPITAL | Age: 77
Setting detail: THERAPIES SERIES
Discharge: HOME | End: 2022-11-01

## 2022-11-01 DIAGNOSIS — M54.50 CHRONIC BILATERAL LOW BACK PAIN WITHOUT SCIATICA: ICD-10-CM

## 2022-11-01 DIAGNOSIS — G89.29 CHRONIC BILATERAL LOW BACK PAIN WITHOUT SCIATICA: ICD-10-CM

## 2022-11-01 DIAGNOSIS — M43.16 SPONDYLOLISTHESIS OF LUMBAR REGION: Primary | ICD-10-CM

## 2022-11-01 PROCEDURE — 97140 MANUAL THERAPY 1/> REGIONS: CPT

## 2022-11-01 PROCEDURE — 97110 THERAPEUTIC EXERCISES: CPT

## 2022-11-01 NOTE — THERAPY TREATMENT NOTE
Outpatient Physical Therapy Ortho Treatment Note  [facility]     Patient Name: Benedict Gomez  : 1945  MRN: [id number]  Today's Date: 2022      Visit Date: 2022    Attendance: 5 visits  Subjective improvement: 20%  MD appt: 22  Recheck due: 22    Visit Dx:    ICD-10-CM ICD-9-CM   1. Spondylolisthesis of lumbar region  M43.16 738.4   2. Chronic bilateral low back pain without sciatica  M54.50 724.2    G89.29 338.29       Patient Active Problem List   Diagnosis   • Dyspnea on exertion        Past Medical History:   Diagnosis Date   • Hypertension    • Spinal stenosis of lumbar region         Past Surgical History:   Procedure Laterality Date   • BACK SURGERY  2022   • CHOLECYSTECTOMY     • ECTOPIC PREGNANCY SURGERY     • HYSTERECTOMY          PT Ortho     Row Name 22 0800       Subjective Comments    Subjective Comments Pt states she was able to change sheets and take shower without a lot of pain this a.m. which is improvement per subj report.  -PM       Precautions and Contraindications    Precautions L4-5 fusion  -PM       Subjective Pain    Able to rate subjective pain? yes  -PM    Pre-Treatment Pain Level 2  -PM    Post-Treatment Pain Level 1  -PM       Posture/Observations    Posture/Observations Comments same presentations as prev session  -PM          User Key  (r) = Recorded By, (t) = Taken By, (c) = Cosigned By    Initials Name Provider Type    PM Naomi Michel PTA Physical Therapist Assistant                             PT Assessment/Plan     Row Name 22 0800          PT Assessment    Assessment Comments Pt did well with Pro II and gradually improving with therex for core stab. Good effort and participation.  -PM        PT Plan    PT Frequency 2x/week  -PM     PT Plan Comments cont fxn strength and stability as well as manual intervention. work toward/progress seated to DD if able  -PM           User Key  (r) = Recorded By, (t)  "= Taken By, (c) = Cosigned By    Initials Name Provider Type    PM Marilu Naomihansel Gonzalez, PTA Physical Therapist Assistant                   OP Exercises     Row Name 11/01/22 0800             Subjective Comments    Subjective Comments Pt states she was able to change sheets and take shower without a lot of pain this a.m. which is improvement per subj report.  -PM         Subjective Pain    Able to rate subjective pain? yes  -PM      Pre-Treatment Pain Level 2  -PM      Post-Treatment Pain Level 1  -PM         Exercise 1    Exercise Name 1 Pro II for gentle LE conditioning and warmup  -PM      Time 1 6'  -PM      Additional Comments L3.5  -PM         Exercise 2    Exercise Name 2 seated thomas HS S chair/stool  -PM      Reps 2 2  -PM      Time 2 30\"  -PM         Exercise 3    Exercise Name 3 seated fig 4 S  -PM      Cueing 3 Verbal  -PM      Reps 3 2  -PM      Time 3 30:  -PM         Exercise 4    Exercise Name 4 stand alt hip abd w/TA  -PM      Cueing 4 Verbal  -PM      Sets 4 1  -PM      Reps 4 15  -PM         Exercise 5    Exercise Name 5 stand alt hip ext w/TA  -PM      Cueing 5 Verbal  -PM      Sets 5 1  -PM      Reps 5 15  -PM         Exercise 6    Exercise Name 6 standing march w/TA  -PM      Cueing 6 Verbal  -PM      Reps 6 15  -PM         Exercise 7    Exercise Name 7 seated mid rows w/TTA  -PM      Cueing 7 Verbal  -PM      Sets 7 2  -PM      Reps 7 10  -PM      Additional Comments red tband  -PM         Exercise 8    Exercise Name 8 seated PN / TA with gentle march  -PM      Cueing 8 Verbal  -PM      Sets 8 2  -PM      Reps 8 10  -PM         Exercise 9    Exercise Name 9 seated PN / TA LAQ  -PM      Cueing 9 Verbal  -PM      Sets 9 2  -PM      Reps 9 10  -PM         Exercise 10    Exercise Name 10 SKTC review  -PM      Reps 10 1  -PM      Time 10 30\"  -PM         Exercise 11    Exercise Name 11 piriformis S  -PM      Reps 11 2  -PM      Time 11 30\"  -PM         Exercise 12    Exercise Name 12 PPT/low bridge " "w/TA  -PM      Cueing 12 Verbal;Tactile  -PM      Reps 12 15  -PM      Time 12 3\"  -PM         Exercise 13    Exercise Name 13 seated hip abd PN/TA  -PM      Cueing 13 Verbal  -PM      Sets 13 2  -PM      Reps 13 10  -PM      Additional Comments red  -PM            User Key  (r) = Recorded By, (t) = Taken By, (c) = Cosigned By    Initials Name Provider Type    PM Naomi Michel, MADELAINE Physical Therapist Assistant                              PT OP Goals     Row Name 11/01/22 0800          PT Short Term Goals    STG Date to Achieve 11/09/22  -PM     STG 1 Demonstrate independence/compliance in performance of initial HEP  -PM     STG 1 Progress Ongoing;Progressing  -PM     STG 2 Demonstrate improved seated PN/postural positioning with seated core stabilization activities with minimal cues required to correct  -PM     STG 2 Progress Ongoing;Progressing  -PM     STG 3 Demonstrate independence in performance of isometric TA contraction in various positions for functional carryover into daily activity performance  -PM     STG 3 Progress Ongoing;Progressing  -PM        Long Term Goals    LTG Date to Achieve 11/30/22  -PM     LTG 1 Subjectively report 50% improvement or greater in pain symptoms and tolerance to housework and daily activities  -PM     LTG 1 Progress Ongoing  -PM     LTG 2 improve modified oswestry score to 20% or less  -PM     LTG 2 Progress Ongoing  -PM     LTG 3 Demonstate improved bilatearl hip MMT to 5/5 in all planes  -PM     LTG 3 Progress Ongoing  -PM     LTG 4 Tolerate 15-20 minutes or greater of standing stabilization/strengthening activities without increased low back/R hip pain & symptoms  -PM     LTG 4 Progress Ongoing  -PM     LTG 6 Demonstrate proper lifting/body mechanics when lifting lightweight box/crate from 6” stool <> waist without increased pain  -PM     LTG 6 Progress Ongoing  -PM        Time Calculation    PT Goal Re-Cert Due Date 11/09/22  -PM           User Key  (r) = Recorded By, " (t) = Taken By, (c) = Cosigned By    Initials Name Provider Type    Naomi Lyons PTA Physical Therapist Assistant                Therapy Education  Education Details: seated core sheet w/PN TA LAQ, march and hip abd  Given: HEP, Symptoms/condition management, Pain management, Posture/body mechanics  Program: Reinforced, Progressed  How Provided: Verbal, Demonstration, Written  Provided to: Patient  Level of Understanding: Verbalized, Demonstrated              Time Calculation:   Start Time: 0800  Stop Time: 0855  Time Calculation (min): 55 min  Therapy Charges for Today     Code Description Service Date Service Provider Modifiers Qty    17443707335 HC PT THER PROC EA 15 MIN 11/1/2022 Naomi Michel PTA GP, CQ 3    65675802471 HC PT MANUAL THERAPY EA 15 MIN 11/1/2022 Naomi Michel PTA GP, CQ 1                    Naomi Michel PTA  11/1/2022

## 2022-11-03 ENCOUNTER — HOSPITAL ENCOUNTER (OUTPATIENT)
Dept: PHYSICAL THERAPY | Facility: HOSPITAL | Age: 77
Setting detail: THERAPIES SERIES
Discharge: HOME | End: 2022-11-03

## 2022-11-03 DIAGNOSIS — G89.29 CHRONIC BILATERAL LOW BACK PAIN WITHOUT SCIATICA: ICD-10-CM

## 2022-11-03 DIAGNOSIS — M54.50 CHRONIC BILATERAL LOW BACK PAIN WITHOUT SCIATICA: ICD-10-CM

## 2022-11-03 DIAGNOSIS — M43.16 SPONDYLOLISTHESIS OF LUMBAR REGION: Primary | ICD-10-CM

## 2022-11-03 PROCEDURE — 97110 THERAPEUTIC EXERCISES: CPT

## 2022-11-03 PROCEDURE — 97140 MANUAL THERAPY 1/> REGIONS: CPT

## 2022-11-03 NOTE — THERAPY TREATMENT NOTE
Outpatient Physical Therapy Ortho Treatment Note  [facility]     Patient Name: Benedict Gomez  : 1945  MRN: [id number]  Today's Date: 11/3/2022      Visit Date: 2022    Attendance: 6 visits  Subjective improvement: 20%?  MD appt: 22  Recheck due: 22    Visit Dx:    ICD-10-CM ICD-9-CM   1. Spondylolisthesis of lumbar region  M43.16 738.4   2. Chronic bilateral low back pain without sciatica  M54.50 724.2    G89.29 338.29       Patient Active Problem List   Diagnosis   • Dyspnea on exertion        Past Medical History:   Diagnosis Date   • Hypertension    • Spinal stenosis of lumbar region         Past Surgical History:   Procedure Laterality Date   • BACK SURGERY  2022   • CHOLECYSTECTOMY     • ECTOPIC PREGNANCY SURGERY     • HYSTERECTOMY          PT Ortho     Row Name 22 0900       Subjective Comments    Subjective Comments Pt states a lot of incr pain when I got up yesterday; pt state even though I was hurting went ahead and moved clothes around so day went downhill from there.  -PM       Precautions and Contraindications    Precautions L4-5 fusion  -PM       Subjective Pain    Able to rate subjective pain? yes  -PM    Pre-Treatment Pain Level 10  -PM       Posture/Observations    Posture/Observations Comments L IC high vs R, more = after LAD's. R ASIS in to L - 2 cycles of MET's and more level  -PM       Transfers    Comment, (Transfers) slow and guarded transitions  -PM       Gait/Stairs (Locomotion)    Comment, (Gait/Stairs) antalgic gait  -PM          User Key  (r) = Recorded By, (t) = Taken By, (c) = Cosigned By    Initials Name Provider Type    PM Naomi Michel PTA Physical Therapist Assistant                             PT Assessment/Plan     Row Name 22 0900          PT Assessment    Assessment Comments Patient presented with much higher pain rating this a.m. and was notably more guarded with all transitions. Therefore, therex was  "with decreased intensity - stabilization all performed on table. Taut R side of back. Advised pt to rest today and maybe another cycle of stretching only tonight - hold on new ex today to rest her back. Probable relation to moving clothes around in closet?  -PM        PT Plan    PT Frequency 2x/week  -PM     PT Plan Comments monitor post Rx symptoms and if pt feeling better/resume seated/std therex as incidated  -PM           User Key  (r) = Recorded By, (t) = Taken By, (c) = Cosigned By    Initials Name Provider Type    PM Naomi Michel, PTA Physical Therapist Assistant                   OP Exercises     Row Name 11/03/22 0900             Subjective Comments    Subjective Comments Pt states a lot of incr pain when I got up yesterday; pt state even though I was hurting went ahead and moved clothes around so day went downhill from there.  -PM         Subjective Pain    Able to rate subjective pain? yes  -PM      Pre-Treatment Pain Level 10  -PM      Post-Treatment Pain Level 8  -PM         Exercise 1    Exercise Name 1 seated HS S chair/stool  -PM      Reps 1 2  -PM      Time 1 30\"  -PM         Exercise 2    Exercise Name 2 seated gentle fig 4 s no pressure  -PM      Reps 2 2  -PM      Time 2 30\"  -PM         Exercise 3    Exercise Name 3 SKTC S  -PM      Reps 3 2  -PM      Time 3 30\"  -PM         Exercise 4    Exercise Name 4 piriformis S  -PM      Reps 4 2  -PM      Time 4 30\"  -PM         Exercise 5    Exercise Name 5 gentle bridge/iso TA-Kegal + Add ball  -PM      Cueing 5 Verbal  -PM      Sets 5 2  -PM      Reps 5 8  -PM      Time 5 pause  -PM         Exercise 6    Exercise Name 6 SL clam  -PM      Cueing 6 Verbal  -PM      Sets 6 2  -PM      Reps 6 10  -PM      Time 6 pause  -PM         Exercise 7    Exercise Name 7 SL hip abd  -PM      Cueing 7 Verbal  -PM      Sets 7 2  -PM      Reps 7 8  -PM         Exercise 8    Exercise Name 8 MET's with PTA  -PM            User Key  (r) = Recorded By, (t) = Taken " By, (c) = Cosigned By    Initials Name Provider Type    PM Marilu, Naomi Gonzalez PTA Physical Therapist Assistant                         Manual Rx (last 36 hours)     Manual Treatments     Row Name 11/03/22 0900             Manual Rx 1    Manual Rx 1 Location IS  -PM      Manual Rx 1 Type L LAD; MET R HS / L hip flexor  -PM      Manual Rx 1 Duration 4'  -PM         Manual Rx 2    Manual Rx 2 Location seated lumbosacral leaning over mat table  -PM      Manual Rx 2 Type MFR/STM-IASTM  -PM      Manual Rx 2 Duration 7'  -PM            User Key  (r) = Recorded By, (t) = Taken By, (c) = Cosigned By    Initials Name Provider Type    PM Marilu Naomi Gonzalez PTA Physical Therapist Assistant                 PT OP Goals     Row Name 11/03/22 0900          PT Short Term Goals    STG Date to Achieve 11/09/22  -PM     STG 1 Demonstrate independence/compliance in performance of initial HEP  -PM     STG 1 Progress Ongoing;Progressing  -PM     STG 2 Demonstrate improved seated PN/postural positioning with seated core stabilization activities with minimal cues required to correct  -PM     STG 2 Progress Ongoing;Progressing  -PM     STG 3 Demonstrate independence in performance of isometric TA contraction in various positions for functional carryover into daily activity performance  -PM     STG 3 Progress Ongoing;Progressing  -PM        Long Term Goals    LTG Date to Achieve 11/30/22  -PM     LTG 1 Subjectively report 50% improvement or greater in pain symptoms and tolerance to housework and daily activities  -PM     LTG 1 Progress Ongoing  -PM     LTG 2 improve modified oswestry score to 20% or less  -PM     LTG 2 Progress Ongoing  -PM     LTG 3 Demonstate improved bilatearl hip MMT to 5/5 in all planes  -PM     LTG 3 Progress Ongoing  -PM     LTG 4 Tolerate 15-20 minutes or greater of standing stabilization/strengthening activities without increased low back/R hip pain & symptoms  -PM     LTG 4 Progress Ongoing  -PM     LTG 6  Demonstrate proper lifting/body mechanics when lifting lightweight box/crate from 6” stool <> waist without increased pain  -PM     LTG 6 Progress Ongoing  -PM        Time Calculation    PT Goal Re-Cert Due Date 11/09/22  -PM           User Key  (r) = Recorded By, (t) = Taken By, (c) = Cosigned By    Initials Name Provider Type    PM Naomi Michel PTA Physical Therapist Assistant                Therapy Education  Education Details: advised more rest today  Given: HEP, Symptoms/condition management, Pain management, Posture/body mechanics  Program: Reinforced  How Provided: Verbal, Demonstration, Written  Provided to: Patient  Level of Understanding: Verbalized, Demonstrated              Time Calculation:   Start Time: 0932  Stop Time: 1015  Time Calculation (min): 43 min  Therapy Charges for Today     Code Description Service Date Service Provider Modifiers Qty    18196930980 HC PT MANUAL THERAPY EA 15 MIN 11/3/2022 Naomi Michel PTA GP, CQ 1    97729116645 HC PT THER PROC EA 15 MIN 11/3/2022 Naomi Michel PTA GP, CQ 2                    Naomi Michel PTA  11/3/2022

## 2022-11-07 ENCOUNTER — HOSPITAL ENCOUNTER (OUTPATIENT)
Dept: PHYSICAL THERAPY | Facility: HOSPITAL | Age: 77
Setting detail: THERAPIES SERIES
Discharge: HOME | End: 2022-11-07

## 2022-11-07 DIAGNOSIS — M43.16 SPONDYLOLISTHESIS OF LUMBAR REGION: Primary | ICD-10-CM

## 2022-11-07 DIAGNOSIS — M54.50 CHRONIC BILATERAL LOW BACK PAIN WITHOUT SCIATICA: ICD-10-CM

## 2022-11-07 DIAGNOSIS — G89.29 CHRONIC BILATERAL LOW BACK PAIN WITHOUT SCIATICA: ICD-10-CM

## 2022-11-07 PROCEDURE — 97140 MANUAL THERAPY 1/> REGIONS: CPT

## 2022-11-07 PROCEDURE — 97110 THERAPEUTIC EXERCISES: CPT

## 2022-11-07 NOTE — THERAPY TREATMENT NOTE
Outpatient Physical Therapy Ortho Treatment Note  [facility]     Patient Name: Benedict Gomez  : 1945  MRN: [id number]  Today's Date: 2022      Visit Date: 2022    Attendance: 7 visits  Subjective improvement: ~20%  MD appt: 22  Recheck due: 22    Visit Dx:    ICD-10-CM ICD-9-CM   1. Spondylolisthesis of lumbar region  M43.16 738.4   2. Chronic bilateral low back pain without sciatica  M54.50 724.2    G89.29 338.29       Patient Active Problem List   Diagnosis   • Dyspnea on exertion        Past Medical History:   Diagnosis Date   • Hypertension    • Spinal stenosis of lumbar region         Past Surgical History:   Procedure Laterality Date   • BACK SURGERY  2022   • CHOLECYSTECTOMY     • ECTOPIC PREGNANCY SURGERY     • HYSTERECTOMY          PT Ortho     Row Name 22 0900       Subjective Comments    Subjective Comments Pt states feeling better than at last visit.  -PM       Precautions and Contraindications    Precautions L4-5 fusion  -PM       Subjective Pain    Able to rate subjective pain? yes  -PM    Pre-Treatment Pain Level 4  -PM       Posture/Observations    Posture/Observations Comments L IC high vs R, more = after LAD's. R ASIS inf to L  -PM          User Key  (r) = Recorded By, (t) = Taken By, (c) = Cosigned By    Initials Name Provider Type    PM Naomi Michel PTA Physical Therapist Assistant                             PT Assessment/Plan     Row Name 22 1000          PT Assessment    Assessment Comments Pt presented with lower pain levels today, and she did well with seated core as well as starting some standing therex for fxn strength and stability. Pelvic asymmetry today was not as significant today.  -PM        PT Plan    PT Frequency 2x/week  -PM     PT Plan Comments monitor post Rx; try std hip abd w/TA  -PM           User Key  (r) = Recorded By, (t) = Taken By, (c) = Cosigned By    Initials Name Provider Type    PM  "Naomi Michel, MADELAINE Physical Therapist Assistant                   OP Exercises     Row Name 11/07/22 0900             Subjective Comments    Subjective Comments Pt states feeling better than at last visit.  -PM         Subjective Pain    Able to rate subjective pain? yes  -PM      Pre-Treatment Pain Level 4  -PM      Post-Treatment Pain Level 3  -PM         Exercise 1    Exercise Name 1 Pro II LE conditioning and warm up  -PM      Time 1 6'  -PM      Additional Comments L3  -PM         Exercise 2    Exercise Name 2 seated gentle fig 4 s no pressure  -PM      Reps 2 2  -PM      Time 2 30\"  -PM         Exercise 3    Exercise Name 3 seated HS S  -PM      Reps 3 2  -PM      Time 3 30\"  -PM         Exercise 4    Exercise Name 4 PN TA LAQ  -PM      Cueing 4 Verbal  -PM      Sets 4 2  -PM      Reps 4 10  -PM         Exercise 5    Exercise Name 5 PN TA march  -PM      Cueing 5 Verbal  -PM      Sets 5 2  -PM      Reps 5 10  -PM         Exercise 6    Exercise Name 6 PN TA seated hip abd  -PM      Cueing 6 Verbal  -PM      Sets 6 1  -PM      Reps 6 20  -PM      Additional Comments red tband  -PM         Exercise 7    Exercise Name 7 standing march w/TA  -PM      Sets 7 2  -PM      Reps 7 10  -PM         Exercise 8    Exercise Name 8 Sit<>stand w/Kegal and iso TA  -PM      Cueing 8 Verbal  -PM      Sets 8 2  -PM      Reps 8 5  -PM      Additional Comments mod ht plinth; UE/LE  -PM         Exercise 9    Exercise Name 9 supine SKTC S  -PM      Reps 9 2  -PM      Time 9 30\"  -PM         Exercise 10    Exercise Name 10 HL piriformis S  -PM      Reps 10 2  -PM      Time 10 30\"  -PM         Exercise 11    Exercise Name 11 PPT/Low bridge w/Add  -PM      Cueing 11 Verbal  -PM      Sets 11 2  -PM      Reps 11 10  -PM         Exercise 12    Exercise Name 12 SL Clams thomas  -PM      Reps 12 15  -PM         Exercise 13    Exercise Name 13 see manual  -PM            User Key  (r) = Recorded By, (t) = Taken By, (c) = Cosigned By    " Initials Name Provider Type    PM Marilu, Naoim Gonzalez PTA Physical Therapist Assistant                         Manual Rx (last 36 hours)     Manual Treatments     Row Name 11/07/22 1100             Manual Rx 1    Manual Rx 1 Location IS  -PM      Manual Rx 1 Type L LAD; MET R HS / L hip flexor  -PM      Manual Rx 1 Duration 4'  -PM         Manual Rx 2    Manual Rx 2 Location seated lumbosacral leaning over mat table  -PM      Manual Rx 2 Type MFR/STM-IASTM  -PM      Manual Rx 2 Duration 7'  -PM            User Key  (r) = Recorded By, (t) = Taken By, (c) = Cosigned By    Initials Name Provider Type    PM Marilu Naomi Gonzalez PTA Physical Therapist Assistant                 PT OP Goals     Row Name 11/07/22 1000 11/07/22 0900       PT Short Term Goals    STG Date to Achieve 11/09/22  -PM --    STG 1 Demonstrate independence/compliance in performance of initial HEP  -PM --    STG 1 Progress Ongoing;Progressing  -PM --    STG 2 Demonstrate improved seated PN/postural positioning with seated core stabilization activities with minimal cues required to correct  -PM --    STG 2 Progress Ongoing;Progressing  -PM --    STG 3 Demonstrate independence in performance of isometric TA contraction in various positions for functional carryover into daily activity performance  -PM --    STG 3 Progress Ongoing;Progressing  -PM --       Long Term Goals    LTG Date to Achieve 11/30/22  -PM --    LTG 1 Subjectively report 50% improvement or greater in pain symptoms and tolerance to housework and daily activities  -PM --    LTG 1 Progress Ongoing  -PM --    LTG 2 improve modified oswestry score to 20% or less  -PM --    LTG 2 Progress Ongoing  -PM --    LTG 3 Demonstate improved bilatearl hip MMT to 5/5 in all planes  -PM --    LTG 3 Progress Ongoing  -PM --    LTG 4 Tolerate 15-20 minutes or greater of standing stabilization/strengthening activities without increased low back/R hip pain & symptoms  -PM --    LTG 4 Progress Ongoing  -PM  --    LTG 6 Demonstrate proper lifting/body mechanics when lifting lightweight box/crate from 6” stool <> waist without increased pain  -PM --    LTG 6 Progress Ongoing  -PM --       Time Calculation    PT Goal Re-Cert Due Date -- 11/09/22  -PM          User Key  (r) = Recorded By, (t) = Taken By, (c) = Cosigned By    Initials Name Provider Type    PM Naomi Michel PTA Physical Therapist Assistant                Therapy Education  Given: HEP, Symptoms/condition management, Pain management, Posture/body mechanics  Program: Reinforced  How Provided: Verbal, Demonstration  Provided to: Patient  Level of Understanding: Verbalized, Demonstrated              Time Calculation:   Start Time: 0930  Stop Time: 1018  Time Calculation (min): 48 min  Therapy Charges for Today     Code Description Service Date Service Provider Modifiers Qty    14029711191 HC PT THER PROC EA 15 MIN 11/7/2022 Naomi Michel PTA GP, CQ 2    61921479367 HC PT MANUAL THERAPY EA 15 MIN 11/7/2022 Naomi Michel PTA GP, CQ 1                    Naomi Michel PTA  11/7/2022

## 2022-11-09 ENCOUNTER — APPOINTMENT (OUTPATIENT)
Dept: PHYSICAL THERAPY | Facility: HOSPITAL | Age: 77
End: 2022-11-09

## 2022-11-15 ENCOUNTER — HOSPITAL ENCOUNTER (OUTPATIENT)
Dept: PHYSICAL THERAPY | Facility: HOSPITAL | Age: 77
Setting detail: THERAPIES SERIES
Discharge: HOME | End: 2022-11-15

## 2022-11-15 DIAGNOSIS — M43.16 SPONDYLOLISTHESIS OF LUMBAR REGION: Primary | ICD-10-CM

## 2022-11-15 PROCEDURE — 97140 MANUAL THERAPY 1/> REGIONS: CPT | Performed by: PHYSICAL THERAPIST

## 2022-11-15 PROCEDURE — 97110 THERAPEUTIC EXERCISES: CPT | Performed by: PHYSICAL THERAPIST

## 2022-11-16 NOTE — THERAPY PROGRESS REPORT/RE-CERT
Outpatient Physical Therapy Ortho Progress Note  [facility]     Patient Name: Benedict Gomez  : 1945  MRN: [id number]  Today's Date: 11/15/2022      Visit Date: 11/15/2022     Attendance: 8 visits  Subjective improvement: 25%  MD appt: 22  Recheck due: 2022    Visit Dx:    ICD-10-CM ICD-9-CM   1. Spondylolisthesis of lumbar region  M43.16 738.4       Patient Active Problem List   Diagnosis   • Dyspnea on exertion        Past Medical History:   Diagnosis Date   • Hypertension    • Spinal stenosis of lumbar region         Past Surgical History:   Procedure Laterality Date   • BACK SURGERY  2022   • CHOLECYSTECTOMY     • ECTOPIC PREGNANCY SURGERY     • HYSTERECTOMY          PT Ortho     Row Name 11/15/22 0800       Precautions and Contraindications    Precautions L4-5 fusion  -KG       Subjective Pain    Able to rate subjective pain? yes  -KG       Posture/Observations    Posture/Observations Comments Improving overall postural awareness; conitnues to require intermittent cues during treatment. Fatigues wtih standing therex. Supine L iC elevated vs R, equal after LAD.  -KG       Neural Tension Signs- Lower Quarter Clearing    Slump Bilateral:;Negative  -KG    SLR Bilateral:;Negative  -KG       Sensory Screen for Light Touch- Lower Quarter Clearing    L1 (inguinal area) Bilateral:;Intact  -KG    L2 (anterior mid thigh) Bilateral:;Intact  -KG    L3 (distal anterior thigh) Bilateral:;Intact  -KG    L4 (medial lower leg/foot) Bilateral:;Intact  -KG    L5 (lateral lower leg/great toe) Bilateral:;Intact  -KG       SI/Hip Screen- Lower Quarter Clearing    ASIS compression Bilateral:;Negative  -KG    ASIS distraction Bilateral:;Negative  -KG    Tiffani's/Rogelio's test Bilateral:;Negative  -KG    Posterior thigh sheer Bilateral:;Negative  -KG       Lumbosacral Palpation    Lumbosacral Palpation? Yes  -KG    Erector Spinae (Paraspinals) Bilateral:;Tender;Guarded/taut  -KG     Lumbosacral Palpation Comments TTP at R>L lumbar parapsinals and near top of R IC/QL area. Mild TTP at R post hip.  -KG       General ROM    GENERAL ROM COMMENTS Bilateral hip, knee, ankle AROM WFL  -KG       Head/Neck/Trunk    Trunk Extension AROM 50% full range wtih mild low back pain  -KG    Trunk Flexion AROM Fingertips to knee jt line  -KG    Trunk Lt Lateral Flexion AROM WFL with tighness reported on R  -KG    Trunk Rt Lateral Flexion AROM WFL with no pain  -KG    Head/Neck/Trunk Comments Gentle trunk AROM performed  -KG       MMT Right Lower Ext    Rt Hip Flexion MMT, Gross Movement (4+/5) good plus  -KG    Rt Hip ABduction MMT, Gross Movement (4/5) good  -KG    Rt Hip ADduction MMT, Gross Movement (4+/5) good plus  -KG    Rt Knee Extension MMT, Gross Movement (5/5) normal  -KG    Rt Knee Flexion MMT, Gross Movement (5/5) normal  -KG    Rt Ankle Plantarflexion MMT, Gross Movement (5/5) normal  -KG    Rt Ankle Dorsiflexion MMT, Gross Movement (5/5) normal  -KG       MMT Left Lower Ext    Lt Hip Flexion MMT, Gross Movement (5/5) normal  -KG    Lt Hip ABduction MMT, Gross Movement (4+/5) good plus  -KG    Lt Hip ADduction MMT, Gross Movement (5/5) normal  -KG    Lt Knee Extension MMT, Gross Movement (5/5) normal  -KG    Lt Knee Flexion MMT, Gross Movement (5/5) normal  -KG    Lt Ankle Plantarflexion MMT, Gross Movement (5/5) normal  -KG    Lt Ankle Dorsiflexion MMT, Gross Movement (5/5) normal  -KG       Sensation    Sensation WNL? WFL  -KG    Light Touch No apparent deficits  -KG       Lower Extremity Flexibility    Hamstrings Bilateral:;Moderately limited  -KG    Hip Flexors Bilateral:;Mildly limited  -KG    Hip External Rotators Bilateral:;Mildly limited  -KG    Hip Internal Rotators Bilateral:;Mildly limited  -KG       Transfers    Comment, (Transfers) Mild cues for log roll wit sup<>sit transfer  -KG       Gait/Stairs (Locomotion)    Comment, (Gait/Stairs) Ambulates wtih no AD. Overall non-antalgic gait.   -KG          User Key  (r) = Recorded By, (t) = Taken By, (c) = Cosigned By    Initials Name Provider Type    KG Nahomy Huynh, PT Physical Therapist                             PT Assessment/Plan     Row Name 11/15/22 0800          PT Assessment    Functional Limitations Limitation in home management;Limitations in community activities;Performance in leisure activities;Performance in self-care ADL  -KG     Impairments Impaired flexibility;Impaired muscle endurance;Muscle strength;Pain;Poor body mechanics;Posture;Range of motion  -KG     Assessment Comments Pt progressing slowly overall with PT. Does demonstrate improvements in overall core/hip stability. Much better performance with seated stability and was able to tolerate standing stability progressions without any increased pain. Pt does fatigue with standing therex and with sit<>stands. Pt continues to report increased pain with functional and housework activities at home, as well as with standing and walking. Pt does state her tolerance is a little better but progressing slowly. Did well with seated stability with good form and positioning. Pt continues to need work on functional BLE strength, dynamic stability, posture/ awareness, & functional activity tolerance & will benefit from continued skilled PT services.  -KG     Rehab Potential Good  -KG     Patient/caregiver participated in establishment of treatment plan and goals Yes  -KG     Patient would benefit from skilled therapy intervention Yes  -KG        PT Plan    PT Frequency 2x/week  -KG     Predicted Duration of Therapy Intervention (PT) 6-8 visits  -KG     PT Plan Comments Continue progress functional core/postural stability. Could potentially progress to dynadisc for seated core. Continue manual/soft tissue work. Resume pro II next visit. Continue soft tissue/manual work  -KG           User Key  (r) = Recorded By, (t) = Taken By, (c) = Cosigned By    Initials Name Provider Type     "KG Nahomy Huynh, PT Physical Therapist                   OP Exercises     Row Name 11/15/22 0800             Subjective Comments    Subjective Comments Reports slow improvements in her back; 25% overall improvement. CAn stay up on feet longer, but still has pain. By mid afternoon she's walking bent over.  -KG         Subjective Pain    Able to rate subjective pain? yes  -KG      Pre-Treatment Pain Level 3  -KG      Post-Treatment Pain Level 2  -KG         Exercise 1    Exercise Name 1 Pro II LE conditioning and warm up  -KG      Additional Comments deferred due to time  -KG         Exercise 2    Exercise Name 2 Seated HS S  -KG      Cueing 2 Verbal  -KG      Reps 2 2  -KG      Time 2 30\" hold  -KG         Exercise 3    Exercise Name 3 seated gentle fig 4 s no pressure  -KG      Cueing 3 Verbal  -KG      Reps 3 2  -KG      Time 3 30\" hold  -KG         Exercise 4    Exercise Name 4 Standing alt marching with posture/TA  -KG      Cueing 4 Verbal  -KG      Sets 4 2  -KG      Reps 4 10  -KG         Exercise 5    Exercise Name 5 Standing alt hip abd LSR with posture/TA  -KG      Cueing 5 Verbal  -KG      Sets 5 2  -KG      Reps 5 10  -KG         Exercise 6    Exercise Name 6 standing alt hip ext SLR with posture/TA  -KG      Cueing 6 Verbal  -KG      Sets 6 2  -KG      Reps 6 10  -KG         Exercise 7    Exercise Name 7 Sit<>stands wtih kegel/iso TA  -KG      Cueing 7 Verbal  -KG      Sets 7 1  -KG      Reps 7 10  -KG      Additional Comments mod ht plinth; UE prn  -KG         Exercise 8    Exercise Name 8 Seated PN/TA alt LAQ  -KG      Cueing 8 Verbal  -KG      Sets 8 2  -KG      Reps 8 10  -KG         Exercise 9    Exercise Name 9 Seated PN/TA alt marching  -KG      Cueing 9 Verbal  -KG      Sets 9 2  -KG      Reps 9 10  -KG         Exercise 10    Exercise Name 10 supine SKTC S  -KG      Reps 10 2  -KG      Time 10 30\" hold  -KG         Exercise 11    Exercise Name 11 HL piriformis S  -KG      Reps 11 2  -KG      " "Time 11 30\" hold  -KG         Exercise 12    Exercise Name 12 PPT/Low bridge w/Add  -KG      Cueing 12 Verbal  -KG      Reps 12 1  -KG      Time 12 10  -KG            User Key  (r) = Recorded By, (t) = Taken By, (c) = Cosigned By    Initials Name Provider Type    LAURENT Nahomy Huynh, PT Physical Therapist                         Manual Rx (last 36 hours)     Manual Treatments     Row Name 11/15/22 0800             Manual Rx 1    Manual Rx 1 Location LLE  -KG      Manual Rx 1 Type LAD  -KG      Manual Rx 1 Duration 1-2 min  -KG         Manual Rx 2    Manual Rx 2 Location seated lumbosacral leaning over mat table  -KG      Manual Rx 2 Type MFR/STM-IASTM  -KG      Manual Rx 2 Duration 8 min  -KG            User Key  (r) = Recorded By, (t) = Taken By, (c) = Cosigned By    Initials Name Provider Type    LAURENT Nahomy Huynh, PT Physical Therapist                 PT OP Goals     Row Name 11/15/22 0800          PT Short Term Goals    STG Date to Achieve 11/09/22  -KG     STG 1 Demonstrate independence/compliance in performance of initial HEP  -KG     STG 1 Progress Met  -KG     STG 2 Demonstrate improved seated PN/postural positioning with seated core stabilization activities with minimal cues required to correct  -KG     STG 2 Progress Met  -KG     STG 3 Demonstrate independence in performance of isometric TA contraction in various positions for functional carryover into daily activity performance  -KG     STG 3 Progress Met  -KG        Long Term Goals    LTG Date to Achieve 12/06/22  -KG     LTG 1 Subjectively report 50% improvement or greater in pain symptoms and tolerance to housework and daily activities  -KG     LTG 1 Progress Ongoing  -KG     LTG 2 improve modified oswestry score to 20% or less  -KG     LTG 2 Progress Ongoing  -KG     LTG 3 Demonstate improved bilatearl hip MMT to 5/5 in all planes  -KG     LTG 3 Progress Ongoing  -KG     LTG 4 Tolerate 15-20 minutes or greater of standing " stabilization/strengthening activities without increased low back/R hip pain & symptoms  -KG     LTG 4 Progress Ongoing  -KG     LTG 6 Demonstrate proper lifting/body mechanics when lifting lightweight box/crate from 6” stool <> waist without increased pain  -KG     LTG 6 Progress Ongoing  -KG        Time Calculation    PT Goal Re-Cert Due Date 12/06/22  -KG           User Key  (r) = Recorded By, (t) = Taken By, (c) = Cosigned By    Initials Name Provider Type    KG Nahomy Huynh, PT Physical Therapist                Therapy Education  Given: HEP, Symptoms/condition management, Pain management, Posture/body mechanics  Program: Reinforced  How Provided: Verbal, Demonstration  Provided to: Patient  Level of Understanding: Verbalized, Demonstrated    Outcome Measure Options: Modified Oswestry  Modified Oswestry  Modified Oswestry Score/Comments: 14 = 28%      Time Calculation:   Start Time: 0808 (pt arrived late)  Stop Time: 0850  Time Calculation (min): 42 min  Therapy Charges for Today     Code Description Service Date Service Provider Modifiers Qty    27148869253 HC PT THER PROC EA 15 MIN 11/15/2022 Nahomy Huynh, PT GP 2    21405688891 HC PT MANUAL THERAPY EA 15 MIN 11/15/2022 Nahomy Huynh, PT GP 1          PT G-Codes  Outcome Measure Options: Modified Oswestry  Modified Oswestry Score/Comments: 14 = 28%         Nahomy Huynh PT  11/15/2022

## 2022-11-17 ENCOUNTER — APPOINTMENT (OUTPATIENT)
Dept: PHYSICAL THERAPY | Facility: HOSPITAL | Age: 77
End: 2022-11-17

## 2022-11-29 ENCOUNTER — HOSPITAL ENCOUNTER (OUTPATIENT)
Dept: PHYSICAL THERAPY | Facility: HOSPITAL | Age: 77
Setting detail: THERAPIES SERIES
Discharge: HOME | End: 2022-11-29

## 2022-11-29 DIAGNOSIS — M54.50 CHRONIC BILATERAL LOW BACK PAIN WITHOUT SCIATICA: ICD-10-CM

## 2022-11-29 DIAGNOSIS — G89.29 CHRONIC BILATERAL LOW BACK PAIN WITHOUT SCIATICA: ICD-10-CM

## 2022-11-29 DIAGNOSIS — M43.16 SPONDYLOLISTHESIS OF LUMBAR REGION: Primary | ICD-10-CM

## 2022-11-29 PROCEDURE — 97140 MANUAL THERAPY 1/> REGIONS: CPT

## 2022-11-29 PROCEDURE — 97110 THERAPEUTIC EXERCISES: CPT

## 2022-11-29 NOTE — THERAPY TREATMENT NOTE
Outpatient Physical Therapy Ortho Treatment Note  [facility]     Patient Name: Benedict Gomez  : 1945  MRN: [id number]  Today's Date: 2022      Visit Date: 2022    Attendance: 9 visits  Subjective improvement: 25%  MD appt: return after first of yr for appt and CT  Recheck due: 22    Visit Dx:    ICD-10-CM ICD-9-CM   1. Spondylolisthesis of lumbar region  M43.16 738.4   2. Chronic bilateral low back pain without sciatica  M54.50 724.2    G89.29 338.29       Patient Active Problem List   Diagnosis   • Dyspnea on exertion        Past Medical History:   Diagnosis Date   • Hypertension    • Spinal stenosis of lumbar region         Past Surgical History:   Procedure Laterality Date   • BACK SURGERY  2022   • CHOLECYSTECTOMY     • ECTOPIC PREGNANCY SURGERY     • HYSTERECTOMY          PT Ortho     Row Name 22 0800       Subjective Comments    Subjective Comments Pt states she has been covered up with her daughter's serious illness/hospitalization and has not been able to do a lot of HEP but she is doing best she can. MD advised her to walk as much as she tolerates and to return after first of year for CT f/u Pt also notes that pn not bad in a.m. but worsens later in the day.  -PM       Precautions and Contraindications    Precautions L4-5 fusion  -PM       Subjective Pain    Able to rate subjective pain? yes  -PM    Pre-Treatment Pain Level 2  -PM    Post-Treatment Pain Level 2  -PM          User Key  (r) = Recorded By, (t) = Taken By, (c) = Cosigned By    Initials Name Provider Type    PM Naomi Michel PTA Physical Therapist Assistant                             PT Assessment/Plan     Row Name 22 0800          PT Assessment    Assessment Comments Pt has not been able to do as much HEP as would typically due to personal issues with family illnesses. Did a good job with therex today including the fernando disc and pallof press. Holds posture well  "during excs but tends to give in to fatigue when done. Pt is counseled to go gently with walking progressions paying attention to posture and pain and not foregoing core stab excs.  -PM        PT Plan    PT Frequency 2x/week  -PM     Predicted Duration of Therapy Intervention (PT) 6-8 visits  -PM     PT Plan Comments possible lat step up and overs for LE strength; cont DD seated core and pallof std fxn stability  -PM           User Key  (r) = Recorded By, (t) = Taken By, (c) = Cosigned By    Initials Name Provider Type    PM Naomi Michel, MADELAINE Physical Therapist Assistant                   OP Exercises     Row Name 11/29/22 0800             Subjective Comments    Subjective Comments Pt states she has been covered up with her daughter's serious illness/hospitalization and has not been able to do a lot of HEP but she is doing best she can. MD advised her to walk as much as she tolerates and to return after first of year for CT f/u Pt also notes that pn not bad in a.m. but worsens later in the day.  -PM         Subjective Pain    Able to rate subjective pain? yes  -PM      Pre-Treatment Pain Level 2  -PM      Post-Treatment Pain Level 2  -PM         Exercise 1    Exercise Name 1 Pro II LE conditioning  -PM      Time 1 8'  -PM      Additional Comments L3  -PM         Exercise 2    Exercise Name 2 seated HS S  -PM      Reps 2 1  -PM      Time 2 30\"  -PM         Exercise 3    Exercise Name 3 seated gentle fig 4 S and traditional piriformis S  -PM      Reps 3 1 ea  -PM      Time 3 30\"  -PM         Exercise 4    Exercise Name 4 sit stands w/iso TA/Kegal mod ht plinth  -PM      Reps 4 15  -PM      Additional Comments slow and controlled  -PM         Exercise 5    Exercise Name 5 Twyla Disc seated PN / TA hip flex  -PM      Cueing 5 Verbal  -PM      Sets 5 2  -PM      Reps 5 10  -PM         Exercise 6    Exercise Name 6 Twyla Disc seated LAQ  -PM      Cueing 6 Verbal  -PM      Sets 6 2  -PM      Reps 6 10  -PM         " "Exercise 7    Exercise Name 7 Twyla disc seated mid row  -PM      Cueing 7 Verbal  -PM      Sets 7 1  -PM      Reps 7 20  -PM      Additional Comments GTB  -PM         Exercise 8    Exercise Name 8 stand march w/TA  -PM      Reps 8 10 for review  -PM         Exercise 9    Exercise Name 9 stand hip abd w/TA  -PM      Reps 9 10 review  -PM         Exercise 10    Exercise Name 10 stand hip ext w/TA  -PM      Reps 10 10 review  -PM         Exercise 11    Exercise Name 11 step ups  -PM      Reps 11 15  -PM      Additional Comments Ruiz; 6\" HR support  -PM         Exercise 12    Exercise Name 12 low bridge w/TA and add  -PM      Reps 12 10  -PM      Time 12 5\"  -PM         Exercise 13    Exercise Name 13 SL hip abd + TA  -PM      Reps 13 10  -PM         Exercise 14    Exercise Name 14 prone over pillow gentle hip ext  -PM      Cueing 14 Verbal  -PM      Sets 14 1  -PM      Reps 14 10  -PM            User Key  (r) = Recorded By, (t) = Taken By, (c) = Cosigned By    Initials Name Provider Type    PM Naomi Michel PTA Physical Therapist Assistant                         Manual Rx (last 36 hours)     Manual Treatments     Row Name 11/29/22 0800             Manual Rx 1    Manual Rx 1 Location prone lumbosacral  -PM      Manual Rx 1 Type MFR/STM  -PM      Manual Rx 1 Duration 8-9'  -PM            User Key  (r) = Recorded By, (t) = Taken By, (c) = Cosigned By    Initials Name Provider Type    PM Naomi Michel PTA Physical Therapist Assistant                 PT OP Goals     Row Name 11/29/22 0800          PT Short Term Goals    STG Date to Achieve 11/09/22  -PM     STG 1 Demonstrate independence/compliance in performance of initial HEP  -PM     STG 1 Progress Met  -PM     STG 2 Demonstrate improved seated PN/postural positioning with seated core stabilization activities with minimal cues required to correct  -PM     STG 2 Progress Met  -PM     STG 3 Demonstrate independence in performance of isometric TA contraction " in various positions for functional carryover into daily activity performance  -PM     STG 3 Progress Met  -PM        Long Term Goals    LTG Date to Achieve 12/06/22  -PM     LTG 1 Subjectively report 50% improvement or greater in pain symptoms and tolerance to housework and daily activities  -PM     LTG 1 Progress Ongoing  -PM     LTG 2 improve modified oswestry score to 20% or less  -PM     LTG 2 Progress Ongoing  -PM     LTG 3 Demonstate improved bilatearl hip MMT to 5/5 in all planes  -PM     LTG 3 Progress Ongoing  -PM     LTG 4 Tolerate 15-20 minutes or greater of standing stabilization/strengthening activities without increased low back/R hip pain & symptoms  -PM     LTG 4 Progress Ongoing  -PM     LTG 6 Demonstrate proper lifting/body mechanics when lifting lightweight box/crate from 6” stool <> waist without increased pain  -PM     LTG 6 Progress Ongoing  -PM           User Key  (r) = Recorded By, (t) = Taken By, (c) = Cosigned By    Initials Name Provider Type    PM Naomi Michel PTA Physical Therapist Assistant                Therapy Education  Given: HEP, Symptoms/condition management, Pain management, Posture/body mechanics  Program: Reinforced  How Provided: Verbal, Demonstration  Provided to: Patient  Level of Understanding: Verbalized, Demonstrated              Time Calculation:   Start Time: 0810  Stop Time: 0900  Time Calculation (min): 50 min  Therapy Charges for Today     Code Description Service Date Service Provider Modifiers Qty    19235575246 HC PT MANUAL THERAPY EA 15 MIN 11/29/2022 Naomi Michel PTA GP, CQ 1    19555105730 HC PT THER PROC EA 15 MIN 11/29/2022 Naomi Michel PTA GP, CQ 2                    Naomi Michel PTA  11/29/2022

## 2022-12-01 ENCOUNTER — HOSPITAL ENCOUNTER (OUTPATIENT)
Dept: PHYSICAL THERAPY | Facility: HOSPITAL | Age: 77
Setting detail: THERAPIES SERIES
Discharge: HOME | End: 2022-12-01
Payer: MEDICARE

## 2022-12-01 DIAGNOSIS — M54.50 CHRONIC BILATERAL LOW BACK PAIN WITHOUT SCIATICA: ICD-10-CM

## 2022-12-01 DIAGNOSIS — M43.16 SPONDYLOLISTHESIS OF LUMBAR REGION: Primary | ICD-10-CM

## 2022-12-01 DIAGNOSIS — G89.29 CHRONIC BILATERAL LOW BACK PAIN WITHOUT SCIATICA: ICD-10-CM

## 2022-12-01 PROCEDURE — 97110 THERAPEUTIC EXERCISES: CPT

## 2022-12-01 NOTE — THERAPY TREATMENT NOTE
"    Outpatient Physical Therapy Ortho Treatment Note  [facility]     Patient Name: Benedict Gomez  : 1945  MRN: [id number]  Today's Date: 2022      Visit Date: 2022    Attendance: 10 visits  Subjective improvement: 30-35%  MD appt: return after first of year / appt and CT TBS  Recheck due: 22    Visit Dx:    ICD-10-CM ICD-9-CM   1. Spondylolisthesis of lumbar region  M43.16 738.4   2. Chronic bilateral low back pain without sciatica  M54.50 724.2    G89.29 338.29       Patient Active Problem List   Diagnosis   • Dyspnea on exertion        Past Medical History:   Diagnosis Date   • Hypertension    • Spinal stenosis of lumbar region         Past Surgical History:   Procedure Laterality Date   • BACK SURGERY  2022   • CHOLECYSTECTOMY     • ECTOPIC PREGNANCY SURGERY     • HYSTERECTOMY          PT Ortho     Row Name 22 0800       Subjective Comments    Subjective Comments Pt states she did ok after last session; states \"in fact, I had a good day yesterday.\"  -PM       Precautions and Contraindications    Precautions L4-5 fusion  -PM       Subjective Pain    Able to rate subjective pain? yes  -PM    Pre-Treatment Pain Level 3  -PM    Post-Treatment Pain Level 3  -PM          User Key  (r) = Recorded By, (t) = Taken By, (c) = Cosigned By    Initials Name Provider Type    PM Naomi Michel, MADELAINE Physical Therapist Assistant                             PT Assessment/Plan     Row Name 22 0800          PT Assessment    Assessment Comments Pt presented noting she was doing a little better; looked good on fernando disc and less cues, better control. Had just began her standing fxn strength and stability when received a family emergency call. Therefore, remainder of treatment this date suspended.  -PM        PT Plan    PT Frequency 2x/week  -PM     Predicted Duration of Therapy Intervention (PT) 6-8 visits  -PM     PT Plan Comments resume POC nxt visit with standing " "fxn stability emphasis and manual for pain  -PM           User Key  (r) = Recorded By, (t) = Taken By, (c) = Cosigned By    Initials Name Provider Type    PM Naomi Michel PTA Physical Therapist Assistant                   OP Exercises     Row Name 12/01/22 0800             Subjective Comments    Subjective Comments Pt states she did ok after last session; states \"in fact, I had a good day yesterday.\"  -PM         Subjective Pain    Able to rate subjective pain? yes  -PM      Pre-Treatment Pain Level 3  -PM      Post-Treatment Pain Level 3  -PM         Exercise 1    Exercise Name 1 Pro II LE conditioning  -PM      Time 1 8'  -PM      Additional Comments L3  -PM         Exercise 2    Exercise Name 2 seated HS S  -PM      Reps 2 2  -PM      Time 2 30\"  -PM         Exercise 3    Exercise Name 3 seated gentle fig 4 S and traditional piriformis S  -PM      Reps 3 2  -PM      Time 3 30\"  -PM         Exercise 4    Exercise Name 4 sit stands w/iso TA/Kegal mod ht plinth  -PM      Cueing 4 Verbal  -PM      Reps 4 15  -PM         Exercise 5    Exercise Name 5 Twyla Disc seated PN / TA hip flex  -PM      Cueing 5 Verbal  -PM      Sets 5 2  -PM      Reps 5 10  -PM         Exercise 6    Exercise Name 6 Twyla Disc seated LAQ  -PM      Cueing 6 Verbal  -PM      Sets 6 2  -PM      Reps 6 10  -PM         Exercise 7    Exercise Name 7 Tywla disc seated mid row  -PM      Cueing 7 Verbal  -PM      Sets 7 1  -PM      Reps 7 20  -PM      Additional Comments GTB  -PM         Exercise 8    Exercise Name 8 MTF step ups 4\" w/HR support  -PM      Cueing 8 Verbal;Demo  -PM      Reps 8 deferred d/t patient having to leave on a family emergency(daughter)  -PM         Exercise 9    Exercise Name 9 Lat step ups and contralateral hip abd  -PM      Reps 9 deferred d/t pt having to leave on a family emergency  -PM         Exercise 10    Exercise Name 10 pallof press with Kegal/TA  -PM      Cueing 10 Verbal  -PM      Sets 10 2  -PM      Reps 10 10 "  -PM      Additional Comments GTB  -PM         Exercise 11    Exercise Name 11 pt received a family emergency call and had to leave / remainder of treatment halted this date  -PM      Reps 11 --  -PM         Exercise 12    Exercise Name 12 --  -PM      Reps 12 --  -PM      Time 12 --  -PM         Exercise 13    Exercise Name 13 --  -PM      Reps 13 --  -PM         Exercise 14    Exercise Name 14 --  -PM      Cueing 14 --  -PM      Sets 14 --  -PM      Reps 14 --  -PM            User Key  (r) = Recorded By, (t) = Taken By, (c) = Cosigned By    Initials Name Provider Type    PM Naomi Michel PTA Physical Therapist Assistant                         Manual Rx (last 36 hours)     Manual Treatments     Row Name 12/01/22 0800             Manual Rx 1    Manual Rx 1 Location --  -PM      Manual Rx 1 Type --  -PM      Manual Rx 1 Duration --  -PM            User Key  (r) = Recorded By, (t) = Taken By, (c) = Cosigned By    Initials Name Provider Type    PM Naomi Michel PTA Physical Therapist Assistant                 PT OP Goals     Row Name 12/01/22 0800          PT Short Term Goals    STG Date to Achieve 11/09/22  -PM     STG 1 Demonstrate independence/compliance in performance of initial HEP  -PM     STG 1 Progress Met  -PM     STG 2 Demonstrate improved seated PN/postural positioning with seated core stabilization activities with minimal cues required to correct  -PM     STG 2 Progress Met  -PM     STG 3 Demonstrate independence in performance of isometric TA contraction in various positions for functional carryover into daily activity performance  -PM     STG 3 Progress Met  -PM        Long Term Goals    LTG Date to Achieve 12/06/22  -PM     LTG 1 Subjectively report 50% improvement or greater in pain symptoms and tolerance to housework and daily activities  -PM     LTG 1 Progress Ongoing  -PM     LTG 2 improve modified oswestry score to 20% or less  -PM     LTG 2 Progress Ongoing  -PM     LTG 3 Demonstate  improved bilatearl hip MMT to 5/5 in all planes  -PM     LTG 3 Progress Ongoing  -PM     LTG 4 Tolerate 15-20 minutes or greater of standing stabilization/strengthening activities without increased low back/R hip pain & symptoms  -PM     LTG 4 Progress Ongoing  -PM     LTG 6 Demonstrate proper lifting/body mechanics when lifting lightweight box/crate from 6” stool <> waist without increased pain  -PM     LTG 6 Progress Ongoing  -PM        Time Calculation    PT Goal Re-Cert Due Date 12/06/22  -PM           User Key  (r) = Recorded By, (t) = Taken By, (c) = Cosigned By    Initials Name Provider Type    PM Naomi Michel PTA Physical Therapist Assistant                Therapy Education  Given: HEP, Symptoms/condition management, Pain management, Posture/body mechanics  Program: Reinforced  How Provided: Verbal, Demonstration  Provided to: Patient  Level of Understanding: Verbalized, Demonstrated              Time Calculation:   Start Time: 0803  Stop Time: 0830  Time Calculation (min): 27 min  Therapy Charges for Today     Code Description Service Date Service Provider Modifiers Qty    [id number] HC PT THER PROC EA 15 MIN 12/1/2022 Naomi Michel PTA GP, CQ 2                    Naomi Michel PTA  12/1/2022

## 2022-12-05 ENCOUNTER — APPOINTMENT (OUTPATIENT)
Dept: PHYSICAL THERAPY | Facility: HOSPITAL | Age: 77
End: 2022-12-05
Payer: MEDICARE

## 2022-12-08 ENCOUNTER — APPOINTMENT (OUTPATIENT)
Dept: PHYSICAL THERAPY | Facility: HOSPITAL | Age: 77
End: 2022-12-08
Payer: MEDICARE

## 2023-03-14 ENCOUNTER — OFFICE VISIT (OUTPATIENT)
Dept: FAMILY MEDICINE CLINIC | Facility: CLINIC | Age: 78
End: 2023-03-14
Payer: MEDICARE

## 2023-03-14 VITALS
HEIGHT: 62 IN | OXYGEN SATURATION: 96 % | HEART RATE: 63 BPM | WEIGHT: 161 LBS | SYSTOLIC BLOOD PRESSURE: 136 MMHG | BODY MASS INDEX: 29.63 KG/M2 | DIASTOLIC BLOOD PRESSURE: 78 MMHG | TEMPERATURE: 97.4 F | RESPIRATION RATE: 16 BRPM

## 2023-03-14 DIAGNOSIS — E78.2 MIXED HYPERLIPIDEMIA: Primary | ICD-10-CM

## 2023-03-14 PROCEDURE — 99213 OFFICE O/P EST LOW 20 MIN: CPT | Performed by: FAMILY MEDICINE

## 2023-03-14 NOTE — PROGRESS NOTES
Subjective   Benedict Gomez is a 77 y.o. female.     History of Present Illness  77-year-old female with hyperlipidemia recent back surgery weight loss doing well      The following portions of the patient's history were reviewed and updated as appropriate: allergies, current medications, past family history, past medical history, past social history, past surgical history and problem list.    Review of Systems   Respiratory: Negative for shortness of breath.    Cardiovascular: Negative for chest pain and leg swelling.   Musculoskeletal: Positive for arthralgias and back pain. Negative for myalgias.        Recent back surgery is helped       Objective   Physical Exam  Vitals and nursing note reviewed.   Constitutional:       Appearance: Normal appearance.   Cardiovascular:      Rate and Rhythm: Normal rate and regular rhythm.   Pulmonary:      Effort: Pulmonary effort is normal.      Breath sounds: Normal breath sounds.   Musculoskeletal:      Right lower leg: No edema.      Left lower leg: No edema.   Neurological:      General: No focal deficit present.      Mental Status: She is alert and oriented to person, place, and time.   Psychiatric:         Mood and Affect: Mood normal.         Behavior: Behavior normal.         Thought Content: Thought content normal.         Judgment: Judgment normal.         Assessment & Plan   Diagnoses and all orders for this visit:    1. Mixed hyperlipidemia (Primary)  -     Lipid Panel  -     Comprehensive Metabolic Panel       Plan-has completed immunizations-continue weight reduction 10 more pounds over the next year to around 150+ talked about being active and Mediterranean diet

## 2023-03-15 LAB
ALBUMIN SERPL-MCNC: 4.3 G/DL (ref 3.5–5.2)
ALBUMIN/GLOB SERPL: 1.7 G/DL
ALP SERPL-CCNC: 81 U/L (ref 39–117)
ALT SERPL-CCNC: 22 U/L (ref 1–33)
AST SERPL-CCNC: 20 U/L (ref 1–32)
BILIRUB SERPL-MCNC: 0.6 MG/DL (ref 0–1.2)
BUN SERPL-MCNC: 23 MG/DL (ref 8–23)
BUN/CREAT SERPL: 21.7 (ref 7–25)
CALCIUM SERPL-MCNC: 10 MG/DL (ref 8.6–10.5)
CHLORIDE SERPL-SCNC: 104 MMOL/L (ref 98–107)
CHOLEST SERPL-MCNC: 138 MG/DL (ref 0–200)
CO2 SERPL-SCNC: 30.8 MMOL/L (ref 22–29)
CREAT SERPL-MCNC: 1.06 MG/DL (ref 0.57–1)
EGFRCR SERPLBLD CKD-EPI 2021: 54.2 ML/MIN/1.73
GLOBULIN SER CALC-MCNC: 2.6 GM/DL
GLUCOSE SERPL-MCNC: 99 MG/DL (ref 65–99)
HDLC SERPL-MCNC: 65 MG/DL (ref 40–60)
LDLC SERPL CALC-MCNC: 57 MG/DL (ref 0–100)
POTASSIUM SERPL-SCNC: 4.5 MMOL/L (ref 3.5–5.2)
PROT SERPL-MCNC: 6.9 G/DL (ref 6–8.5)
SODIUM SERPL-SCNC: 146 MMOL/L (ref 136–145)
TRIGL SERPL-MCNC: 84 MG/DL (ref 0–150)
VLDLC SERPL CALC-MCNC: 16 MG/DL (ref 5–40)

## 2023-07-06 ENCOUNTER — TELEPHONE (OUTPATIENT)
Dept: FAMILY MEDICINE CLINIC | Facility: CLINIC | Age: 78
End: 2023-07-06

## 2023-07-06 NOTE — TELEPHONE ENCOUNTER
[facility] received a fax that requires your attention. The document has been indexed to the patient’s chart for your review.      Reason for sending: EXTERNAL MEDICAL RECORD NOTIFICATION    Documents Description: HEALTH GUIDE  REGARDING DRUG THERAPY    Name of Sender: [facility]    Date Indexed: 7/6/23    Notes (if needed): SEE FAX IN CHART UNDER MEDICATIONS

## 2023-09-15 ENCOUNTER — OFFICE VISIT (OUTPATIENT)
Dept: FAMILY MEDICINE CLINIC | Facility: CLINIC | Age: 78
End: 2023-09-15
Payer: MEDICARE

## 2023-09-15 VITALS
BODY MASS INDEX: 30 KG/M2 | WEIGHT: 163 LBS | SYSTOLIC BLOOD PRESSURE: 136 MMHG | RESPIRATION RATE: 16 BRPM | OXYGEN SATURATION: 98 % | HEIGHT: 62 IN | DIASTOLIC BLOOD PRESSURE: 74 MMHG | TEMPERATURE: 97.6 F | HEART RATE: 63 BPM

## 2023-09-15 DIAGNOSIS — Z78.0 POST-MENOPAUSAL: ICD-10-CM

## 2023-09-15 DIAGNOSIS — Z00.00 MEDICARE ANNUAL WELLNESS VISIT, SUBSEQUENT: ICD-10-CM

## 2023-09-15 DIAGNOSIS — R41.3 MEMORY LOSS: ICD-10-CM

## 2023-09-15 DIAGNOSIS — I10 ESSENTIAL HYPERTENSION: ICD-10-CM

## 2023-09-15 DIAGNOSIS — E55.9 VITAMIN D DEFICIENCY: ICD-10-CM

## 2023-09-15 DIAGNOSIS — E78.2 MIXED HYPERLIPIDEMIA: Primary | ICD-10-CM

## 2023-09-15 DIAGNOSIS — R53.83 FATIGUE, UNSPECIFIED TYPE: ICD-10-CM

## 2023-09-15 DIAGNOSIS — Z12.31 SCREENING MAMMOGRAM FOR BREAST CANCER: ICD-10-CM

## 2023-09-15 RX ORDER — DULOXETIN HYDROCHLORIDE 20 MG/1
20 CAPSULE, DELAYED RELEASE ORAL EVERY EVENING
COMMUNITY

## 2023-09-15 NOTE — PATIENT INSTRUCTIONS
Advance Care Planning and Advance Directives     You make decisions on a daily basis - decisions about where you want to live, your career, your home, your life. Perhaps one of the most important decisions you face is your choice for future medical care. Take time to talk with your family and your healthcare team and start planning today.  Advance Care Planning is a process that can help you:  Understand possible future healthcare decisions in light of your own experiences  Reflect on those decision in light of your goals and values  Discuss your decisions with those closest to you and the healthcare professionals that care for you  Make a plan by creating a document that reflects your wishes    Surrogate Decision Maker  In the event of a medical emergency, which has left you unable to communicate or to make your own decisions, you would need someone to make decisions for you.  It is important to discuss your preferences for medical treatment with this person while you are in good health.     Qualities of a surrogate decision maker:  Willing to take on this role and responsibility  Knows what you want for future medical care  Willing to follow your wishes even if they don't agree with them  Able to make difficult medical decisions under stressful circumstances    Advance Directives  These are legal documents you can create that will guide your healthcare team and decision maker(s) when needed. These documents can be stored in the electronic medical record.    Living Will - a legal document to guide your care if you have a terminal condition or a serious illness and are unable to communicate. States vary by statute in document names/types, but most forms may include one or more of the following:        -  Directions regarding life-prolonging treatments        -  Directions regarding artificially provided nutrition/hydration        -  Choosing a healthcare decision maker        -  Direction regarding organ/tissue  donation    Durable Power of  for Healthcare - this document names an -in-fact to make medical decisions for you, but it may also allow this person to make personal and financial decisions for you. Please seek the advice of an  if you need this type of document.    **Advance Directives are not required and no one may discriminate against you if you do not sign one.    Medical Orders  Many states allow specific forms/orders signed by your physician to record your wishes for medical treatment in your current state of health. This form, signed in personal communication with your physician, addresses resuscitation and other medical interventions that you may or may not want.      For more information or to schedule a time with a [facility] Advance Care Planning Facilitator contact: [facility] or call [phone] and someone will contact you directly.  You are due for Shingrix vaccination series ( the newest shingles vaccine).  It is a two shot series spaced 2-6 months apart. Please get this vaccine series started at your earliest convenience at your local pharmacy to help avoid shingles outbreak. It is more effective than the old Zostavax vaccine and is recommended even if you have had the Zostavax vaccine in the past.  Once the Shingrix series is completed, it does not need to be repeated.   For more information, please look at the website below:  [facility] Shingrix Vaccine Information      Medicare Wellness  Personal Prevention Plan of Service     Date of Office Visit:    Encounter Provider:  Nikita Sahu MD  Place of Service:  [facility]  Patient Name: Benedict Gomez  :  1945    As part of the Medicare Wellness portion of your visit today, we are providing you with this personalized preventive plan of services (PPPS). This plan is based upon recommendations of the [place] Preventive Services Task Force (USPSTF) and the  Advisory Committee on Immunization Practices (ACIP).    This lists the preventive care services that should be considered, and provides dates of when you are due. Items listed as completed are up-to-date and do not require any further intervention.    Health Maintenance   Topic Date Due   • ZOSTER VACCINE (2 of 3) 02/01/2018   • COVID-19 Vaccine (5 - Pfizer series) 01/27/2023   • DXA SCAN  09/08/2023   • ANNUAL WELLNESS VISIT  09/12/2023   • BMI FOLLOWUP  09/12/2023   • INFLUENZA VACCINE  10/01/2023   • LIPID PANEL  03/14/2024   • COLORECTAL CANCER SCREENING  09/20/2027   • TDAP/TD VACCINES (2 - Td or Tdap) 12/14/2027   • HEPATITIS C SCREENING  Completed   • Pneumococcal Vaccine 65+  Completed       No orders of the defined types were placed in this encounter.      Return in about 6 months (around 3/15/2024), or if symptoms worsen or fail to improve.

## 2023-09-15 NOTE — PROGRESS NOTES
The ABCs of the Annual Wellness Visit  Subsequent Medicare Wellness Visit    Subjective        Benedict Gomez is a 78 y.o. female who presents for a Subsequent Medicare Wellness Visit.    The following portions of the patient's history were reviewed and   updated as appropriate: allergies, current medications, past family history, past medical history, past social history, past surgical history, and problem list.    Review of Systems   HENT: Negative.     Respiratory:  Negative for shortness of breath.    Cardiovascular:  Negative for chest pain and leg swelling.   Gastrointestinal:         Cologuard 2021   Genitourinary: Negative.    Musculoskeletal:  Positive for arthralgias and back pain.   All other systems reviewed and are negative.     Compared to one year ago, the patient feels her physical   health is the same.    Compared to one year ago, the patient feels her mental   health is the same.    Recent Hospitalizations:  She was not admitted to the hospital during the last year.       Current Medical Providers:  Patient Care Team:  Nikita Sahu MD as PCP - General (Family Medicine)    Outpatient Medications Prior to Visit   Medication Sig Dispense Refill    atorvastatin (Lipitor) 40 MG tablet Take 1 tablet by mouth Every Night. 90 tablet 3    CALCIUM CITRATE PO Take 600 mg by mouth Daily.      Cholecalciferol (Vitamin D3) 25 MCG (1000 UT) capsule Take 1 capsule by mouth Daily.      DULoxetine (CYMBALTA) 20 MG capsule Take 1 capsule by mouth Every Evening.      HYDROcodone-acetaminophen (NORCO) 7.5-325 MG per tablet Take 1 tablet by mouth Every 6 (Six) Hours As Needed.      hydroxychloroquine (PLAQUENIL) 200 MG tablet Take 1 tablet by mouth 2 (Two) Times a Day.      losartan-hydrochlorothiazide (HYZAAR) 50-12.5 MG per tablet TAKE 1 TABLET BY MOUTH ONCE DAILY IN THE MORNING FOR BLOOD PRESSURE 90 tablet 3    vitamin B-12 (CYANOCOBALAMIN) 1000 MCG tablet Take 1 tablet by mouth Daily. 90 tablet 3  "   meloxicam (MOBIC) 7.5 MG tablet Take 1 tablet by mouth Every Other Day.      multivitamin with minerals tablet tablet Take 1 tablet by mouth Daily.      pilocarpine (SALAGEN) 5 MG tablet Take 1 tablet by mouth Daily.       No facility-administered medications prior to visit.       Opioid medication/s are on active medication list.  and I have evaluated her active treatment plan and pain score trends (see table).  Vitals:    09/15/23 0816   PainSc: 0-No pain     I have reviewed the chart for potential of high risk medication and harmful drug interactions in the elderly.          Aspirin is not on active medication list.  Aspirin use is not indicated based on review of current medical condition/s. Risk of harm outweighs potential benefits.  .    Patient Active Problem List   Diagnosis    Dyspnea on exertion     Advance Care Planning  Advance Directive is not on file.  ACP discussion was declined by the patient. Patient does not have an advance directive, declines further assistance.     Objective    Vitals:    09/15/23 0816   BP: 136/74   BP Location: Right arm   Patient Position: Sitting   Cuff Size: Adult   Pulse: 63   Resp: 16   Temp: 97.6 °F (36.4 °C)   TempSrc: Temporal   SpO2: 98%   Weight: 73.9 kg (163 lb)   Height: 157.5 cm (62\")   PainSc: 0-No pain     Estimated body mass index is 29.81 kg/m² as calculated from the following:    Height as of this encounter: 157.5 cm (62\").    Weight as of this encounter: 73.9 kg (163 lb).    BMI is >= 25 and <30. (Overweight) The following options were offered after discussion;: exercise counseling/recommendations      Physical Exam  Vitals and nursing note reviewed.   Constitutional:       Appearance: Normal appearance.   HENT:      Right Ear: Tympanic membrane and ear canal normal.      Left Ear: Tympanic membrane and ear canal normal.   Eyes:      Extraocular Movements: Extraocular movements intact.      Pupils: Pupils are equal, round, and reactive to light.   Neck:    "   Vascular: No carotid bruit.   Cardiovascular:      Rate and Rhythm: Normal rate and regular rhythm.      Pulses: Normal pulses.      Heart sounds: Normal heart sounds.   Pulmonary:      Effort: Pulmonary effort is normal.      Breath sounds: Normal breath sounds.      Comments: Breast no masses noted  Abdominal:      General: Abdomen is flat. There is no distension.      Palpations: Abdomen is soft. There is no mass.   Genitourinary:     Comments: Surgical past  Musculoskeletal:      Right lower leg: No edema.      Left lower leg: No edema.   Lymphadenopathy:      Cervical: No cervical adenopathy.   Skin:     General: Skin is warm and dry.   Neurological:      General: No focal deficit present.      Mental Status: She is alert and oriented to person, place, and time.   Psychiatric:         Mood and Affect: Mood normal.         Behavior: Behavior normal.         Thought Content: Thought content normal.         Judgment: Judgment normal.       Does the patient have evidence of cognitive impairment?   No            HEALTH RISK ASSESSMENT    Smoking Status:  Social History     Tobacco Use   Smoking Status Never   Smokeless Tobacco Never       Alcohol Consumption:  Social History     Substance and Sexual Activity   Alcohol Use No       Fall Risk Screen:    [facility] Fall Risk Assessment was completed, and patient is at LOW risk for falls.Assessment completed on:9/15/2023    Depression Screenin/15/2023     8:21 AM   PHQ-2/PHQ-9 Depression Screening   Little Interest or Pleasure in Doing Things 0-->not at all   Feeling Down, Depressed or Hopeless 0-->not at all   PHQ-9: Brief Depression Severity Measure Score 0       Health Habits and Functional and Cognitive Screenin/15/2023     8:21 AM   Functional & Cognitive Status   Do you have difficulty preparing food and eating? No   Do you have difficulty bathing yourself, getting dressed or grooming yourself? No   Do you have difficulty using the toilet? No   Do  you have difficulty moving around from place to place? No   Do you have trouble with steps or getting out of a bed or a chair? No   Current Diet Well Balanced Diet   Dental Exam Up to date   Eye Exam Up to date   Exercise (times per week) 0 times per week   Current Exercises Include No Regular Exercise;House Cleaning   Do you need help using the phone?  No   Are you deaf or do you have serious difficulty hearing?  No   Do you need help to go to places out of walking distance? No   Do you need help shopping? No   Do you need help preparing meals?  No   Do you need help with housework?  No   Do you need help with laundry? No   Do you need help taking your medications? No   Do you need help managing money? No   Do you ever drive or ride in a car without wearing a seat belt? No   Have you felt unusual stress, anger or loneliness in the last month? No   Who do you live with? Spouse   If you need help, do you have trouble finding someone available to you? No   Have you been bothered in the last four weeks by sexual problems? No   Do you have difficulty concentrating, remembering or making decisions? No       Age-appropriate Screening Schedule:  Refer to the list below for future screening recommendations based on patient's age, sex and/or medical conditions. Orders for these recommended tests are listed in the plan section. The patient has been provided with a written plan.    Health Maintenance   Topic Date Due    DXA SCAN  09/08/2023    BMI FOLLOWUP  09/12/2023    COVID-19 Vaccine (5 - Pfizer series) 09/16/2024 (Originally 1/27/2023)    ZOSTER VACCINE (2 of 3) 09/16/2024 (Originally 2/1/2018)    INFLUENZA VACCINE  10/01/2023    LIPID PANEL  03/14/2024    ANNUAL WELLNESS VISIT  09/15/2024    COLORECTAL CANCER SCREENING  09/20/2027    TDAP/TD VACCINES (2 - Td or Tdap) 12/14/2027    HEPATITIS C SCREENING  Completed    Pneumococcal Vaccine 65+  Completed            CMS Preventative Services Quick Reference  Risk Factors  Identified During Encounter:    Fall Risk-High or Moderate: Information on Fall Prevention Shared in After Visit Summary    The above risks/problems have been discussed with the patient.  Pertinent information has been shared with the patient in the After Visit Summary.    Diagnoses and all orders for this visit:    1. Mixed hyperlipidemia (Primary)  -     Comprehensive Metabolic Panel  -     Lipid Panel    2. Memory loss  -     Vitamin B12  -     Folate    3. Fatigue, unspecified type  -     TSH  -     T4, free  -     CBC & Differential    4. Vitamin D deficiency  -     Vitamin D,25-Hydroxy    5. Essential hypertension  -     Cancel: POC Urinalysis Dipstick, Multipro    6. Screening mammogram for breast cancer  -     Mammo Screening Digital Tomosynthesis Bilateral With CAD; Future    7. Post-menopausal  -     DEXA Bone Density Axial; Future    8. Medicare annual wellness visit, subsequent    Plan above-start doing core spinal exercises-has a son with dementia    Follow Up:   Next Medicare Wellness visit to be scheduled in 1 year.      An After Visit Summary and PPPS were made available to the patient.    Eric Sahu MD

## 2023-09-16 LAB
25(OH)D3+25(OH)D2 SERPL-MCNC: 73.2 NG/ML (ref 30–100)
ALBUMIN SERPL-MCNC: 4.2 G/DL (ref 3.8–4.8)
ALBUMIN/GLOB SERPL: 1.7 {RATIO} (ref 1.2–2.2)
ALP SERPL-CCNC: 64 IU/L (ref 44–121)
ALT SERPL-CCNC: 30 IU/L (ref 0–32)
AST SERPL-CCNC: 33 IU/L (ref 0–40)
BASOPHILS # BLD AUTO: 0.1 X10E3/UL (ref 0–0.2)
BASOPHILS NFR BLD AUTO: 1 %
BILIRUB SERPL-MCNC: 1 MG/DL (ref 0–1.2)
BUN SERPL-MCNC: 23 MG/DL (ref 8–27)
BUN/CREAT SERPL: 23 (ref 12–28)
CALCIUM SERPL-MCNC: 9.2 MG/DL (ref 8.7–10.3)
CHLORIDE SERPL-SCNC: 104 MMOL/L (ref 96–106)
CHOLEST SERPL-MCNC: 114 MG/DL (ref 100–199)
CO2 SERPL-SCNC: 25 MMOL/L (ref 20–29)
CREAT SERPL-MCNC: 1.02 MG/DL (ref 0.57–1)
EGFRCR SERPLBLD CKD-EPI 2021: 56 ML/MIN/1.73
EOSINOPHIL # BLD AUTO: 0.2 X10E3/UL (ref 0–0.4)
EOSINOPHIL NFR BLD AUTO: 3 %
ERYTHROCYTE [DISTWIDTH] IN BLOOD BY AUTOMATED COUNT: 12.4 % (ref 11.7–15.4)
FOLATE SERPL-MCNC: 7.6 NG/ML
GLOBULIN SER CALC-MCNC: 2.5 G/DL (ref 1.5–4.5)
GLUCOSE SERPL-MCNC: 95 MG/DL (ref 70–99)
HCT VFR BLD AUTO: 38 % (ref 34–46.6)
HDLC SERPL-MCNC: 62 MG/DL
HGB BLD-MCNC: 12.8 G/DL (ref 11.1–15.9)
IMM GRANULOCYTES # BLD AUTO: 0 X10E3/UL (ref 0–0.1)
IMM GRANULOCYTES NFR BLD AUTO: 0 %
LDLC SERPL CALC-MCNC: 39 MG/DL (ref 0–99)
LYMPHOCYTES # BLD AUTO: 1.6 X10E3/UL (ref 0.7–3.1)
LYMPHOCYTES NFR BLD AUTO: 35 %
MCH RBC QN AUTO: 30.3 PG (ref 26.6–33)
MCHC RBC AUTO-ENTMCNC: 33.7 G/DL (ref 31.5–35.7)
MCV RBC AUTO: 90 FL (ref 79–97)
MONOCYTES # BLD AUTO: 0.4 X10E3/UL (ref 0.1–0.9)
MONOCYTES NFR BLD AUTO: 8 %
NEUTROPHILS # BLD AUTO: 2.5 X10E3/UL (ref 1.4–7)
NEUTROPHILS NFR BLD AUTO: 53 %
PLATELET # BLD AUTO: 178 X10E3/UL (ref 150–450)
POTASSIUM SERPL-SCNC: 3.8 MMOL/L (ref 3.5–5.2)
PROT SERPL-MCNC: 6.7 G/DL (ref 6–8.5)
RBC # BLD AUTO: 4.22 X10E6/UL (ref 3.77–5.28)
SODIUM SERPL-SCNC: 143 MMOL/L (ref 134–144)
T4 FREE SERPL-MCNC: 1.57 NG/DL (ref 0.82–1.77)
TRIGL SERPL-MCNC: 59 MG/DL (ref 0–149)
TSH SERPL DL<=0.005 MIU/L-ACNC: 1.66 UIU/ML (ref 0.45–4.5)
VIT B12 SERPL-MCNC: 600 PG/ML (ref 232–1245)
VLDLC SERPL CALC-MCNC: 13 MG/DL (ref 5–40)
WBC # BLD AUTO: 4.7 X10E3/UL (ref 3.4–10.8)

## 2023-10-26 DIAGNOSIS — I10 ESSENTIAL HYPERTENSION: Primary | ICD-10-CM

## 2023-10-26 RX ORDER — LOSARTAN POTASSIUM AND HYDROCHLOROTHIAZIDE 12.5; 5 MG/1; MG/1
TABLET ORAL
Qty: 90 TABLET | Refills: 3 | Status: SHIPPED | OUTPATIENT
Start: 2023-10-26

## 2023-10-26 NOTE — TELEPHONE ENCOUNTER
Rx Refill Note  Requested Prescriptions     Pending Prescriptions Disp Refills    losartan-hydrochlorothiazide (HYZAAR) 50-12.5 MG per tablet [Pharmacy Med Name: LOSARTAN-HCTZ 50-12.5 MG TAB] 90 tablet 1     Sig: TAKE 1 TABLET BY MOUTH ONCE DAILY IN THE [facility] FOR BLOOD PRESSURE      Last office visit with prescribing clinician: 9/15/2023   Last telemedicine visit with prescribing clinician: Visit date not found   Next office visit with prescribing clinician: 3/18/2024       {TIP  Please add Last Relevant Lab 9/15/23    Wanda Clifford MA  10/26/23, 07:30 CDT

## 2023-11-20 DIAGNOSIS — E78.2 MIXED HYPERLIPIDEMIA: ICD-10-CM

## 2023-11-20 RX ORDER — ATORVASTATIN CALCIUM 40 MG/1
40 TABLET, FILM COATED ORAL NIGHTLY
Qty: 90 TABLET | Refills: 2 | Status: SHIPPED | OUTPATIENT
Start: 2023-11-20

## 2023-11-20 NOTE — TELEPHONE ENCOUNTER
Rx Refill Note  Requested Prescriptions     Pending Prescriptions Disp Refills    atorvastatin (LIPITOR) 40 MG tablet [Pharmacy Med Name: ATORVASTATIN 40 MG TABLET] 90 tablet 1     Sig: TAKE 1 TABLET BY MOUTH EVERY NIGHT      Last office visit with prescribing clinician: 9/15/2023   Last telemedicine visit with prescribing clinician: Visit date not found   Next office visit with prescribing clinician: 3/18/2024       {TIP  Please add Last Relevant Lab 6/15/23    Wanda Clifford MA  11/20/23, 07:59 CST

## 2024-01-08 ENCOUNTER — IMMUNIZATION (OUTPATIENT)
Dept: FAMILY MEDICINE CLINIC | Facility: CLINIC | Age: 79
End: 2024-01-08
Payer: MEDICARE

## 2024-01-08 ENCOUNTER — FLU SHOT (OUTPATIENT)
Dept: FAMILY MEDICINE CLINIC | Facility: CLINIC | Age: 79
End: 2024-01-08
Payer: MEDICARE

## 2024-01-08 DIAGNOSIS — Z23 NEED FOR COVID-19 VACCINE: ICD-10-CM

## 2024-01-08 DIAGNOSIS — Z23 NEED FOR INFLUENZA VACCINATION: Primary | ICD-10-CM

## 2024-01-08 PROCEDURE — G0008 ADMIN INFLUENZA VIRUS VAC: HCPCS | Performed by: FAMILY MEDICINE

## 2024-01-08 PROCEDURE — 90480 ADMN SARSCOV2 VAC 1/ONLY CMP: CPT | Performed by: FAMILY MEDICINE

## 2024-01-08 PROCEDURE — 91320 SARSCV2 VAC 30MCG TRS-SUC IM: CPT | Performed by: FAMILY MEDICINE

## 2024-01-08 PROCEDURE — 90662 IIV NO PRSV INCREASED AG IM: CPT | Performed by: FAMILY MEDICINE

## 2024-01-18 ENCOUNTER — TELEPHONE (OUTPATIENT)
Dept: FAMILY MEDICINE CLINIC | Facility: CLINIC | Age: 79
End: 2024-01-18
Payer: MEDICARE

## 2024-01-18 NOTE — TELEPHONE ENCOUNTER
I'm not sure what she is referring to.  Phone attempt--no answer after multiple rings, vm did not --unable to leave a message.

## 2024-01-18 NOTE — TELEPHONE ENCOUNTER
"  Caller: Benedict Gomez \"Kavya\"    Relationship: Self    Best call back number: [phone]     What is the best time to reach you: ANYTIME    Who are you requesting to speak with (clinical staff, provider,  specific staff member): CLINICAL    What was the call regarding: NEEDING CONTACT NUMBER FOR [facility] [place]. SHE HAS MISPLACED IT    Is it okay if the provider responds through MSTt: NO        "

## 2024-01-18 NOTE — TELEPHONE ENCOUNTER
Pt returned call and was wanting to know if we had form for patient to be [facility].  We do have those forms in office--wife advised she needs to call [facility] to have a new referral [facility] here for primary care to be received at this office.  She verbalized understanding and would call them.  I did tell her I tried to contact them last week, after she had stopped by to leave phone number and have never heard back.

## 2024-03-20 ENCOUNTER — OFFICE VISIT (OUTPATIENT)
Dept: FAMILY MEDICINE CLINIC | Facility: CLINIC | Age: 79
End: 2024-03-20
Payer: MEDICARE

## 2024-03-20 VITALS
WEIGHT: 171.2 LBS | HEIGHT: 62 IN | DIASTOLIC BLOOD PRESSURE: 72 MMHG | RESPIRATION RATE: 16 BRPM | BODY MASS INDEX: 31.5 KG/M2 | SYSTOLIC BLOOD PRESSURE: 140 MMHG | TEMPERATURE: 98 F

## 2024-03-20 DIAGNOSIS — L98.9 SKIN LESION: ICD-10-CM

## 2024-03-20 DIAGNOSIS — G89.29 CHRONIC BILATERAL LOW BACK PAIN WITHOUT SCIATICA: ICD-10-CM

## 2024-03-20 DIAGNOSIS — I10 ESSENTIAL HYPERTENSION: ICD-10-CM

## 2024-03-20 DIAGNOSIS — M54.50 CHRONIC BILATERAL LOW BACK PAIN WITHOUT SCIATICA: ICD-10-CM

## 2024-03-20 DIAGNOSIS — E78.2 MIXED HYPERLIPIDEMIA: Primary | ICD-10-CM

## 2024-03-20 RX ORDER — LOSARTAN POTASSIUM AND HYDROCHLOROTHIAZIDE 12.5; 1 MG/1; MG/1
1 TABLET ORAL DAILY
Qty: 90 TABLET | Refills: 3 | Status: SHIPPED | OUTPATIENT
Start: 2024-03-20

## 2024-03-20 RX ORDER — ZOSTER VACCINE LIVE 19400 [PFU]/.65ML
INJECTION, POWDER, LYOPHILIZED, FOR SUSPENSION SUBCUTANEOUS
COMMUNITY
End: 2024-03-20

## 2024-03-20 NOTE — PROGRESS NOTES
Subjective   Benedict Gomez is a 78 y.o. female.     History of Present Illness  78-year-old female with hyperlipidemia and low back pain      The following portions of the patient's history were reviewed and updated as appropriate: allergies, current medications, past family history, past medical history, past social history, past surgical history, and problem list.    Review of Systems   Respiratory:  Negative for shortness of breath.    Cardiovascular:  Negative for chest pain and leg swelling.        Increase Hyzaar to 100 mg/12.5   Musculoskeletal:  Positive for arthralgias and back pain.        Rest.  Tylenol core exercises if not better will refer back to [place] neurosurgeon       Objective   Physical Exam  Vitals and nursing note reviewed.   Constitutional:       Appearance: She is obese.   Eyes:      Extraocular Movements: Extraocular movements intact.      Pupils: Pupils are equal, round, and reactive to light.   Cardiovascular:      Rate and Rhythm: Normal rate and regular rhythm.      Pulses: Normal pulses.      Heart sounds: Normal heart sounds.   Pulmonary:      Effort: Pulmonary effort is normal.      Breath sounds: Normal breath sounds.   Musculoskeletal:      Right lower leg: No edema.      Left lower leg: No edema.   Skin:     General: Skin is warm and dry.   Neurological:      General: No focal deficit present.      Mental Status: She is alert and oriented to person, place, and time.   Psychiatric:         Mood and Affect: Mood normal.         Behavior: Behavior normal.         Thought Content: Thought content normal.         Judgment: Judgment normal.         Assessment & Plan   Diagnoses and all orders for this visit:    1. Mixed hyperlipidemia (Primary)  -     Comprehensive Metabolic Panel; Future  -     Lipid Panel; Future    2. Skin lesion  -     Ambulatory Referral to Dermatology    3. Chronic bilateral low back pain without sciatica    4. Essential hypertension    Other orders  -      losartan-hydrochlorothiazide (HYZAAR) 100-12.5 MG per tablet; Take 1 tablet by mouth Daily.  Dispense: 90 tablet; Refill: 3       Plan above-start core exercises back for back

## 2024-03-22 LAB
ALBUMIN SERPL-MCNC: 4.1 G/DL (ref 3.5–5.2)
ALBUMIN/GLOB SERPL: 2.2 G/DL
ALP SERPL-CCNC: 54 U/L (ref 39–117)
ALT SERPL-CCNC: 25 U/L (ref 1–33)
AST SERPL-CCNC: 23 U/L (ref 1–32)
BILIRUB SERPL-MCNC: 0.9 MG/DL (ref 0–1.2)
BUN SERPL-MCNC: 20 MG/DL (ref 8–23)
BUN/CREAT SERPL: 20.6 (ref 7–25)
CALCIUM SERPL-MCNC: 8.8 MG/DL (ref 8.6–10.5)
CHLORIDE SERPL-SCNC: 107 MMOL/L (ref 98–107)
CHOLEST SERPL-MCNC: 129 MG/DL (ref 0–200)
CO2 SERPL-SCNC: 28.1 MMOL/L (ref 22–29)
CREAT SERPL-MCNC: 0.97 MG/DL (ref 0.57–1)
EGFRCR SERPLBLD CKD-EPI 2021: 59.9 ML/MIN/1.73
GLOBULIN SER CALC-MCNC: 1.9 GM/DL
GLUCOSE SERPL-MCNC: 89 MG/DL (ref 65–99)
HDLC SERPL-MCNC: 66 MG/DL (ref 40–60)
LDLC SERPL CALC-MCNC: 50 MG/DL (ref 0–100)
POTASSIUM SERPL-SCNC: 4.4 MMOL/L (ref 3.5–5.2)
PROT SERPL-MCNC: 6 G/DL (ref 6–8.5)
SODIUM SERPL-SCNC: 143 MMOL/L (ref 136–145)
TRIGL SERPL-MCNC: 58 MG/DL (ref 0–150)
VLDLC SERPL CALC-MCNC: 13 MG/DL (ref 5–40)

## 2024-06-05 ENCOUNTER — TELEPHONE (OUTPATIENT)
Dept: FAMILY MEDICINE CLINIC | Facility: CLINIC | Age: 79
End: 2024-06-05

## 2024-06-05 NOTE — TELEPHONE ENCOUNTER
"Caller: Benedict Gomez \"Kavya\"    Relationship: Self    Best call back number: [phone]     What medication are you requesting: ANTIBIOTIC    What are your current symptoms: BAD RASH AND ITCHY 30 YEAR OLD INCISION ON STOMACH    How long have you been experiencing symptoms: 2 DAYS    Have you had these symptoms before:    [x] Yes  [] No    Have you been treated for these symptoms before:   [] Yes  [x] No    If a prescription is needed, what is your preferred pharmacy and phone number: [facility] [address] [place] [address] [address] [facility] [phone] [facility] [phone]      Additional notes: PATIENT STATED SHE IS EXPERIENCING A BAD RASH AND ITCHINESS ON INCISION.     I DID INFORM PATIENT AN APPOINTMENT MAY BE NEEDED FIRST.    PLEASE CALL TO ADVISE IF APPOINTMENT IS NEEDED OR NOT.  "

## 2024-06-06 NOTE — TELEPHONE ENCOUNTER
Advised patient to get some hydrocortisone cream over-the-counter-rub it in the irritated area every 2 hours should be better over the next 24 hours

## 2024-06-10 ENCOUNTER — OFFICE VISIT (OUTPATIENT)
Dept: FAMILY MEDICINE CLINIC | Facility: CLINIC | Age: 79
End: 2024-06-10
Payer: MEDICARE

## 2024-06-10 VITALS
WEIGHT: 174.6 LBS | HEART RATE: 65 BPM | SYSTOLIC BLOOD PRESSURE: 165 MMHG | OXYGEN SATURATION: 96 % | TEMPERATURE: 97.8 F | HEIGHT: 62 IN | BODY MASS INDEX: 32.13 KG/M2 | DIASTOLIC BLOOD PRESSURE: 90 MMHG

## 2024-06-10 DIAGNOSIS — I10 PRIMARY HYPERTENSION: ICD-10-CM

## 2024-06-10 DIAGNOSIS — B37.2 YEAST DERMATITIS: Primary | ICD-10-CM

## 2024-06-10 PROBLEM — E78.2 MIXED HYPERLIPIDEMIA: Status: ACTIVE | Noted: 2024-06-10

## 2024-06-10 PROCEDURE — 3077F SYST BP >= 140 MM HG: CPT | Performed by: NURSE PRACTITIONER

## 2024-06-10 PROCEDURE — 3080F DIAST BP >= 90 MM HG: CPT | Performed by: NURSE PRACTITIONER

## 2024-06-10 PROCEDURE — 1159F MED LIST DOCD IN RCRD: CPT | Performed by: NURSE PRACTITIONER

## 2024-06-10 PROCEDURE — 1126F AMNT PAIN NOTED NONE PRSNT: CPT | Performed by: NURSE PRACTITIONER

## 2024-06-10 PROCEDURE — 1160F RVW MEDS BY RX/DR IN RCRD: CPT | Performed by: NURSE PRACTITIONER

## 2024-06-10 PROCEDURE — 99214 OFFICE O/P EST MOD 30 MIN: CPT | Performed by: NURSE PRACTITIONER

## 2024-06-10 RX ORDER — FLUCONAZOLE 200 MG/1
200 TABLET ORAL DAILY
Qty: 21 TABLET | Refills: 0 | Status: SHIPPED | OUTPATIENT
Start: 2024-06-10 | End: 2024-07-01

## 2024-06-10 NOTE — PROGRESS NOTES
CC: Rash    History:  Benedict Gomez is a 78 y.o. female who presents today for evaluation of the above problems.      Patient complains of abdominal rash for around 2 weeks. Typically uses antifungal powder and this keeps it dry and takes care of it, however, it has not worked this time.   BP is elevated, however, she has had a death in the family this morning and is emotional. BP has been appropriate at previous visits.     HPI  ROS:  Review of Systems   Skin:  Positive for rash.       Allergies   Allergen Reactions    Ace Inhibitors Cough    Propoxyphene Rash    Penicillins Hives     Past Medical History:   Diagnosis Date    Hypertension     Spinal stenosis of lumbar region      Past Surgical History:   Procedure Laterality Date    BACK SURGERY  07/05/2022    CHOLECYSTECTOMY      ECTOPIC PREGNANCY SURGERY      HYSTERECTOMY       Family History   Problem Relation Age of Onset    Cancer Mother     Heart disease Mother     No Known Problems Father       reports that she has never smoked. She has been exposed to tobacco smoke. She has never used smokeless tobacco. She reports that she does not drink alcohol and does not use drugs.      Current Outpatient Medications:     atorvastatin (LIPITOR) 40 MG tablet, TAKE 1 TABLET BY MOUTH EVERY NIGHT, Disp: 90 tablet, Rfl: 2    CALCIUM CITRATE PO, Take 600 mg by mouth Daily., Disp: , Rfl:     Cholecalciferol (Vitamin D3) 25 MCG (1000 UT) capsule, Take 1 capsule by mouth Daily., Disp: , Rfl:     HYDROcodone-acetaminophen (NORCO) 7.5-325 MG per tablet, Take 1 tablet by mouth Every 6 (Six) Hours As Needed., Disp: , Rfl:     hydroxychloroquine (PLAQUENIL) 200 MG tablet, Take 1 tablet by mouth 2 (Two) Times a Day., Disp: , Rfl:     losartan-hydrochlorothiazide (HYZAAR) 100-12.5 MG per tablet, Take 1 tablet by mouth Daily., Disp: 90 tablet, Rfl: 3    vitamin B-12 (CYANOCOBALAMIN) 1000 MCG tablet, Take 1 tablet by mouth Daily., Disp: 90 tablet, Rfl: 3    fluconazole  "(Diflucan) 200 MG tablet, Take 1 tablet by mouth Daily for 21 days., Disp: 21 tablet, Rfl: 0    OBJECTIVE:  /90 (BP Location: Left arm, Patient Position: Sitting, Cuff Size: Adult)   Pulse 65   Temp 97.8 °F (36.6 °C) (Temporal)   Ht 157.5 cm (62\")   Wt 79.2 kg (174 lb 9.6 oz)   SpO2 96%   BMI 31.93 kg/m²    Physical Exam  Vitals reviewed.   Constitutional:       Appearance: She is well-developed.   Cardiovascular:      Rate and Rhythm: Normal rate.   Pulmonary:      Effort: Pulmonary effort is normal.   Abdominal:       Neurological:      Mental Status: She is alert and oriented to person, place, and time.   Psychiatric:         Behavior: Behavior normal.         Assessment/Plan    Diagnoses and all orders for this visit:    1. Yeast dermatitis (Primary)  -     fluconazole (Diflucan) 200 MG tablet; Take 1 tablet by mouth Daily for 21 days.  Dispense: 21 tablet; Refill: 0    2. Primary hypertension    No change to BP medication as elevation is likely reactive in nature. Will use oral treatment on rash since medicated powder is not working.     An After Visit Summary was printed and given to the patient at discharge.  Return if symptoms worsen or fail to improve, for Next scheduled follow up.       JAC Mae 6/10/24    Electronically signed.  "

## 2024-08-22 ENCOUNTER — TELEPHONE (OUTPATIENT)
Dept: FAMILY MEDICINE CLINIC | Facility: CLINIC | Age: 79
End: 2024-08-22

## 2024-08-22 RX ORDER — CLOTRIMAZOLE AND BETAMETHASONE DIPROPIONATE 10; .64 MG/G; MG/G
1 CREAM TOPICAL 2 TIMES DAILY
Qty: 15 G | Refills: 5 | Status: SHIPPED | OUTPATIENT
Start: 2024-08-22

## 2024-09-18 ENCOUNTER — OFFICE VISIT (OUTPATIENT)
Dept: FAMILY MEDICINE CLINIC | Facility: CLINIC | Age: 79
End: 2024-09-18
Payer: MEDICARE

## 2024-09-18 VITALS
OXYGEN SATURATION: 97 % | HEIGHT: 62 IN | DIASTOLIC BLOOD PRESSURE: 70 MMHG | RESPIRATION RATE: 18 BRPM | SYSTOLIC BLOOD PRESSURE: 138 MMHG | TEMPERATURE: 97.1 F | BODY MASS INDEX: 30.95 KG/M2 | WEIGHT: 168.2 LBS | HEART RATE: 68 BPM

## 2024-09-18 DIAGNOSIS — Z12.11 ENCOUNTER FOR COLORECTAL CANCER SCREENING: ICD-10-CM

## 2024-09-18 DIAGNOSIS — I10 PRIMARY HYPERTENSION: ICD-10-CM

## 2024-09-18 DIAGNOSIS — E55.9 VITAMIN D DEFICIENCY: ICD-10-CM

## 2024-09-18 DIAGNOSIS — E78.2 MIXED HYPERLIPIDEMIA: Primary | ICD-10-CM

## 2024-09-18 DIAGNOSIS — R53.83 OTHER FATIGUE: ICD-10-CM

## 2024-09-18 DIAGNOSIS — Z00.00 MEDICARE ANNUAL WELLNESS VISIT, SUBSEQUENT: ICD-10-CM

## 2024-09-18 DIAGNOSIS — Z12.12 ENCOUNTER FOR COLORECTAL CANCER SCREENING: ICD-10-CM

## 2024-09-18 DIAGNOSIS — Z23 NEED FOR COVID-19 VACCINE: ICD-10-CM

## 2024-09-18 DIAGNOSIS — R41.3 MEMORY LOSS: ICD-10-CM

## 2024-09-18 LAB
BILIRUB BLD-MCNC: NEGATIVE MG/DL
CLARITY, POC: CLEAR
COLOR UR: YELLOW
GLUCOSE UR STRIP-MCNC: NEGATIVE MG/DL
KETONES UR QL: NEGATIVE
LEUKOCYTE EST, POC: ABNORMAL
NITRITE UR-MCNC: NEGATIVE MG/ML
PH UR: 5.5 [PH] (ref 5–8)
PROT UR STRIP-MCNC: NEGATIVE MG/DL
RBC # UR STRIP: NEGATIVE /UL
SP GR UR: 1.01 (ref 1–1.03)
UROBILINOGEN UR QL: NORMAL

## 2024-09-18 PROCEDURE — 1125F AMNT PAIN NOTED PAIN PRSNT: CPT | Performed by: FAMILY MEDICINE

## 2024-09-18 PROCEDURE — 90480 ADMN SARSCOV2 VAC 1/ONLY CMP: CPT | Performed by: FAMILY MEDICINE

## 2024-09-18 PROCEDURE — 91320 SARSCV2 VAC 30MCG TRS-SUC IM: CPT | Performed by: FAMILY MEDICINE

## 2024-09-18 PROCEDURE — 3078F DIAST BP <80 MM HG: CPT | Performed by: FAMILY MEDICINE

## 2024-09-18 PROCEDURE — G0439 PPPS, SUBSEQ VISIT: HCPCS | Performed by: FAMILY MEDICINE

## 2024-09-18 PROCEDURE — 81003 URINALYSIS AUTO W/O SCOPE: CPT | Performed by: FAMILY MEDICINE

## 2024-09-18 PROCEDURE — 3075F SYST BP GE 130 - 139MM HG: CPT | Performed by: FAMILY MEDICINE

## 2024-09-18 PROCEDURE — 1170F FXNL STATUS ASSESSED: CPT | Performed by: FAMILY MEDICINE

## 2024-09-18 RX ORDER — DULOXETIN HYDROCHLORIDE 20 MG/1
20 CAPSULE, DELAYED RELEASE ORAL EVERY EVENING
COMMUNITY
Start: 2024-09-04

## 2024-09-18 RX ORDER — LANOLIN ALCOHOL/MO/W.PET/CERES
50 CREAM (GRAM) TOPICAL DAILY
COMMUNITY

## 2024-10-16 DIAGNOSIS — R19.5 POSITIVE COLORECTAL CANCER SCREENING USING COLOGUARD TEST: Primary | ICD-10-CM

## 2024-10-29 ENCOUNTER — FLU SHOT (OUTPATIENT)
Dept: FAMILY MEDICINE CLINIC | Facility: CLINIC | Age: 79
End: 2024-10-29
Payer: MEDICARE

## 2024-10-29 DIAGNOSIS — Z23 NEED FOR INFLUENZA VACCINATION: Primary | ICD-10-CM

## 2024-10-29 PROCEDURE — 90662 IIV NO PRSV INCREASED AG IM: CPT | Performed by: FAMILY MEDICINE

## 2024-10-29 PROCEDURE — G0008 ADMIN INFLUENZA VIRUS VAC: HCPCS | Performed by: FAMILY MEDICINE

## 2024-12-02 ENCOUNTER — TELEPHONE (OUTPATIENT)
Dept: FAMILY MEDICINE CLINIC | Facility: CLINIC | Age: 79
End: 2024-12-02
Payer: MEDICARE

## 2024-12-02 DIAGNOSIS — E78.2 MIXED HYPERLIPIDEMIA: ICD-10-CM

## 2024-12-02 NOTE — TELEPHONE ENCOUNTER
[facility] and pt has mammo appt scheduled for Wed--they are needing an order faxed.  Can someone order this?

## 2024-12-03 RX ORDER — ATORVASTATIN CALCIUM 40 MG/1
40 TABLET, FILM COATED ORAL EVERY EVENING
Qty: 90 TABLET | Refills: 2 | Status: SHIPPED | OUTPATIENT
Start: 2024-12-03

## 2024-12-03 NOTE — TELEPHONE ENCOUNTER
Rx Refill Note  Requested Prescriptions     Pending Prescriptions Disp Refills    atorvastatin (LIPITOR) 40 MG tablet [Pharmacy Med Name: Atorvastatin Calcium 40 MG Oral Tablet] 90 tablet 0     Sig: Take 1 tablet by mouth in the evening      Last office visit with prescribing clinician: 9/18/2024   Last telemedicine visit with prescribing clinician: Visit date not found   Next office visit with prescribing clinician: 3/20/2025       {TIP  Please add Last Relevant Lab 9/18/24    Wanda Clifford MA  12/03/24, 07:32 CST

## 2024-12-06 ENCOUNTER — TELEPHONE (OUTPATIENT)
Dept: FAMILY MEDICINE CLINIC | Facility: CLINIC | Age: 79
End: 2024-12-06
Payer: MEDICARE

## 2024-12-06 DIAGNOSIS — R19.5 POSITIVE COLORECTAL CANCER SCREENING USING COLOGUARD TEST: Primary | ICD-10-CM

## 2024-12-06 NOTE — TELEPHONE ENCOUNTER
Pt calling and has been notified with scheduling colon in [place]--she doesn't drive and relies on her granddaughters' to drive her around their work schedules.  This would require 2 trips for consult and procedure.  Inquiring if needs to be done in [place] or can she just go to [place]?       Please advise

## 2024-12-12 ENCOUNTER — OFFICE VISIT (OUTPATIENT)
Dept: GASTROENTEROLOGY | Facility: CLINIC | Age: 79
End: 2024-12-12
Payer: MEDICARE

## 2024-12-12 VITALS
TEMPERATURE: 97 F | SYSTOLIC BLOOD PRESSURE: 140 MMHG | HEART RATE: 70 BPM | DIASTOLIC BLOOD PRESSURE: 74 MMHG | OXYGEN SATURATION: 99 % | BODY MASS INDEX: 31.65 KG/M2 | HEIGHT: 62 IN | WEIGHT: 172 LBS

## 2024-12-12 DIAGNOSIS — Z86.0100 HISTORY OF COLON POLYPS: ICD-10-CM

## 2024-12-12 DIAGNOSIS — R19.5 POSITIVE COLORECTAL CANCER SCREENING USING DNA-BASED STOOL TEST: Primary | ICD-10-CM

## 2024-12-12 PROCEDURE — 99214 OFFICE O/P EST MOD 30 MIN: CPT | Performed by: NURSE PRACTITIONER

## 2024-12-12 PROCEDURE — 3078F DIAST BP <80 MM HG: CPT | Performed by: NURSE PRACTITIONER

## 2024-12-12 PROCEDURE — 3077F SYST BP >= 140 MM HG: CPT | Performed by: NURSE PRACTITIONER

## 2024-12-12 PROCEDURE — 1160F RVW MEDS BY RX/DR IN RCRD: CPT | Performed by: NURSE PRACTITIONER

## 2024-12-12 PROCEDURE — 1159F MED LIST DOCD IN RCRD: CPT | Performed by: NURSE PRACTITIONER

## 2024-12-12 RX ORDER — SODIUM PICOSULFATE, MAGNESIUM OXIDE, AND ANHYDROUS CITRIC ACID 10; 3.5; 12 MG/160ML; G/160ML; G/160ML
1 LIQUID ORAL TAKE AS DIRECTED
Qty: 320 ML | Refills: 0 | Status: CANCELLED | OUTPATIENT
Start: 2024-12-12

## 2024-12-12 RX ORDER — SODIUM, POTASSIUM,MAG SULFATES 17.5-3.13G
2 SOLUTION, RECONSTITUTED, ORAL ORAL TAKE AS DIRECTED
Qty: 354 ML | Refills: 0 | Status: CANCELLED | OUTPATIENT
Start: 2024-12-12

## 2024-12-12 NOTE — PROGRESS NOTES
Chief Complaint   Patient presents with    Colonoscopy     Positive cologuard had colon 7-8 years ago by dr. edge       PCP: Nikita Sahu MD  REFER: Nikita Sahu,*    Subjective     HPI    She presents to office with positive cologaurd test from PCP office Cologuard - Stool, Per Rectum (10/10/2024 07:21) .     She denies change in bowels.  Bowels described as moving irregular (not new).  She will go without bowel movement for several days then have several days with small hard stools then have days with diarrhea.  No abdominal pain.  No BRBPR.  No melena.  Weight is stable.  She has undergone previous colonoscopy evaluation and has personal history of colon polyps (per patient, no record, occurred over 10 years ago).  There is not a family history of colon cancer or colon polyps.    Past Medical History:   Diagnosis Date    Hypertension     Spinal stenosis of lumbar region        Past Surgical History:   Procedure Laterality Date    BACK SURGERY  07/05/2022    CHOLECYSTECTOMY      ECTOPIC PREGNANCY SURGERY      HYSTERECTOMY         Outpatient Medications Marked as Taking for the 12/12/24 encounter (Office Visit) with Rao Marquis APRN   Medication Sig Dispense Refill    atorvastatin (LIPITOR) 40 MG tablet Take 1 tablet by mouth in the evening 90 tablet 2    CALCIUM CITRATE PO Take 600 mg by mouth Daily.      Cholecalciferol (Vitamin D3) 25 MCG (1000 UT) capsule Take 1 capsule by mouth Daily.      clotrimazole-betamethasone (LOTRISONE) 1-0.05 % cream Apply 1 Application topically to the appropriate area as directed 2 (Two) Times a Day. 15 g 5    DULoxetine (CYMBALTA) 20 MG capsule Take 1 capsule by mouth Every Evening.      hydroxychloroquine (PLAQUENIL) 200 MG tablet Take 1 tablet by mouth 2 (Two) Times a Day.      losartan-hydrochlorothiazide (HYZAAR) 100-12.5 MG per tablet Take 1 tablet by mouth Daily. 90 tablet 3    vitamin B-12 (CYANOCOBALAMIN) 1000 MCG tablet Take 1 tablet by mouth  "Daily. 90 tablet 3    vitamin B-6 (PYRIDOXINE) 50 MG tablet Take 1 tablet by mouth Daily.         Allergies   Allergen Reactions    Ace Inhibitors Cough    Propoxyphene Rash    Penicillins Hives       Social History     Socioeconomic History    Marital status:    Tobacco Use    Smoking status: Never     Passive exposure: Current    Smokeless tobacco: Never   Vaping Use    Vaping status: Never Used   Substance and Sexual Activity    Alcohol use: No    Drug use: No       Review of Systems   Constitutional:  Negative for fever and unexpected weight change.   HENT:  Negative for trouble swallowing.    Respiratory:  Negative for shortness of breath.    Cardiovascular:  Negative for chest pain.   Gastrointestinal:  Negative for abdominal pain and anal bleeding.       Objective     Vitals:    12/12/24 0943   BP: 140/74   Pulse: 70   Temp: 97 °F (36.1 °C)   SpO2: 99%   Weight: 78 kg (172 lb)   Height: 157.5 cm (62\")     Body mass index is 31.46 kg/m².    Physical Exam  Constitutional:       Appearance: Normal appearance. She is well-developed.   Eyes:      General: No scleral icterus.  Cardiovascular:      Heart sounds: Normal heart sounds. No murmur heard.  Pulmonary:      Effort: Pulmonary effort is normal.   Abdominal:      General: Bowel sounds are normal. There is no distension.      Palpations: Abdomen is soft.      Tenderness: There is no abdominal tenderness. There is no guarding.   Skin:     General: Skin is warm and dry.      Coloration: Skin is not jaundiced.   Neurological:      Mental Status: She is alert.   Psychiatric:         Behavior: Behavior is cooperative.         Imaging Results (Most Recent)       None            Body mass index is 31.46 kg/m².    Assessment & Plan     Diagnoses and all orders for this visit:    1. Positive colorectal cancer screening using DNA-based stool test (Primary)  -     Case Request; Standing  -     Case Request    2. History of colon polyps    Other orders  -     " Implement Anesthesia Orders Day of Procedure; Standing  -     Follow Anesthesia Guidelines / Protocol; Future        COLONOSCOPY WITH ANESTHESIA (N/A)    I have suggested utilizing Miralax starting 7 days prior to colonoscopy to help improve prep.  I have explained stool may be loose but we do not want Benedict Gomez to be uncomfortable or experiencing fecal incontinence.  Miralax should be adjusted as needed.     Miralax prep   Advised pt to stop use of NSAIDs, Fish Oil, and MV 5 days prior to procedure, per Dr Renee protocol.  Tylenol based products are ok to take.  Pt verbalized understanding.     No plans on further cardiac workup, plans to follow up with cardio in one year    I have explained a positive cologuard indicates the presence of DNA/hgb in stool that is associated with colon polyps or colon cancer.  There is a chance the test is a false positive with that chance being higher for people over the age of 65. This is due to normal changes in DNA associated with getting older (AGA).  Due to the positive result of the Cologuard I recommend pt undergoing colonoscopy.  Colonoscopy remains the gold standard for detection of colon polyps/colon cancer.      All risks, benefits, alternatives, and indications of colonoscopy procedure have been discussed with the patient. Risks to include perforation of the colon requiring possible surgery or colostomy, risk of bleeding from biopsies or removal of colon tissue, possibility of missing a colon polyp or cancer, or adverse drug reaction.  Benefits to include the diagnosis and management of disease of the colon and rectum.  Pt verbalizes understanding and agrees to proceed with procedure.              Rao Marquis, APRN  12/12/24            There are no Patient Instructions on file for this visit.

## 2024-12-26 ENCOUNTER — OFFICE VISIT (OUTPATIENT)
Dept: FAMILY MEDICINE CLINIC | Facility: CLINIC | Age: 79
End: 2024-12-26
Payer: MEDICARE

## 2024-12-26 VITALS
BODY MASS INDEX: 31.65 KG/M2 | TEMPERATURE: 97.4 F | HEIGHT: 62 IN | HEART RATE: 78 BPM | OXYGEN SATURATION: 98 % | RESPIRATION RATE: 18 BRPM | WEIGHT: 172 LBS | SYSTOLIC BLOOD PRESSURE: 126 MMHG | DIASTOLIC BLOOD PRESSURE: 72 MMHG

## 2024-12-26 DIAGNOSIS — Z20.822 EXPOSURE TO COVID-19 VIRUS: ICD-10-CM

## 2024-12-26 DIAGNOSIS — J00 ACUTE NASOPHARYNGITIS: Primary | ICD-10-CM

## 2024-12-26 DIAGNOSIS — R09.81 HEAD CONGESTION: ICD-10-CM

## 2024-12-26 LAB
EXPIRATION DATE: NORMAL
FLUAV AG UPPER RESP QL IA.RAPID: NOT DETECTED
FLUBV AG UPPER RESP QL IA.RAPID: NOT DETECTED
INTERNAL CONTROL: NORMAL
Lab: NORMAL
SARS-COV-2 AG UPPER RESP QL IA.RAPID: NOT DETECTED

## 2024-12-26 PROCEDURE — 87428 SARSCOV & INF VIR A&B AG IA: CPT | Performed by: NURSE PRACTITIONER

## 2024-12-26 PROCEDURE — 3078F DIAST BP <80 MM HG: CPT | Performed by: NURSE PRACTITIONER

## 2024-12-26 PROCEDURE — 1160F RVW MEDS BY RX/DR IN RCRD: CPT | Performed by: NURSE PRACTITIONER

## 2024-12-26 PROCEDURE — 99213 OFFICE O/P EST LOW 20 MIN: CPT | Performed by: NURSE PRACTITIONER

## 2024-12-26 PROCEDURE — 1126F AMNT PAIN NOTED NONE PRSNT: CPT | Performed by: NURSE PRACTITIONER

## 2024-12-26 PROCEDURE — 3074F SYST BP LT 130 MM HG: CPT | Performed by: NURSE PRACTITIONER

## 2024-12-26 PROCEDURE — 1159F MED LIST DOCD IN RCRD: CPT | Performed by: NURSE PRACTITIONER

## 2024-12-26 NOTE — PROGRESS NOTES
CC:   Chief Complaint   Patient presents with    sinus drainage     Exposed to covid over weekend        History:  Benedict Gomez is a 79 y.o. female who presents today for evaluation of the above problems.      HPI    Patient presents for illness.  Reports symptoms started yesterday with sinus symptoms.  Reports runny nose, sinus pressure and occasional cough.  Patient went to a  over the weekend and reports some of her family members have tested positive for COVID.  Patient was wanting tested today.  Afebrile.  Denies any problems breathing.  Taking over-the-counter DayQuil and helping some with symptoms.    Allergies   Allergen Reactions    Ace Inhibitors Cough    Propoxyphene Rash    Penicillins Hives     Past Medical History:   Diagnosis Date    Hypertension     Spinal stenosis of lumbar region      Past Surgical History:   Procedure Laterality Date    BACK SURGERY  2022    CHOLECYSTECTOMY      ECTOPIC PREGNANCY SURGERY      HYSTERECTOMY       Family History   Problem Relation Age of Onset    Cancer Mother     Heart disease Mother     No Known Problems Father     Colon cancer Neg Hx     Colon polyps Neg Hx     Esophageal cancer Neg Hx       reports that she has never smoked. She has been exposed to tobacco smoke. She has never used smokeless tobacco. She reports that she does not drink alcohol and does not use drugs.      Current Outpatient Medications:     atorvastatin (LIPITOR) 40 MG tablet, Take 1 tablet by mouth in the evening, Disp: 90 tablet, Rfl: 2    CALCIUM CITRATE PO, Take 600 mg by mouth Daily., Disp: , Rfl:     Cholecalciferol (Vitamin D3) 25 MCG (1000 UT) capsule, Take 1 capsule by mouth Daily., Disp: , Rfl:     clotrimazole-betamethasone (LOTRISONE) 1-0.05 % cream, Apply 1 Application topically to the appropriate area as directed 2 (Two) Times a Day., Disp: 15 g, Rfl: 5    DULoxetine (CYMBALTA) 20 MG capsule, Take 1 capsule by mouth Every Evening., Disp: , Rfl:      "hydroxychloroquine (PLAQUENIL) 200 MG tablet, Take 1 tablet by mouth 2 (Two) Times a Day., Disp: , Rfl:     losartan-hydrochlorothiazide (HYZAAR) 100-12.5 MG per tablet, Take 1 tablet by mouth Daily., Disp: 90 tablet, Rfl: 3    vitamin B-12 (CYANOCOBALAMIN) 1000 MCG tablet, Take 1 tablet by mouth Daily., Disp: 90 tablet, Rfl: 3    vitamin B-6 (PYRIDOXINE) 50 MG tablet, Take 1 tablet by mouth Daily., Disp: , Rfl:     OBJECTIVE:  /72 (BP Location: Left arm, Patient Position: Sitting, Cuff Size: Adult)   Pulse 78   Temp 97.4 °F (36.3 °C) (Temporal)   Resp 18   Ht 157.5 cm (62\")   Wt 78 kg (172 lb)   SpO2 98%   BMI 31.46 kg/m²    Estimated body mass index is 31.46 kg/m² as calculated from the following:    Height as of this encounter: 157.5 cm (62\").    Weight as of this encounter: 78 kg (172 lb).                  Physical Exam  Vitals reviewed.   Constitutional:       General: She is not in acute distress.     Appearance: Normal appearance.   HENT:      Head: Normocephalic and atraumatic.      Right Ear: External ear normal. There is impacted cerumen.      Left Ear: Tympanic membrane and external ear normal.      Nose: Rhinorrhea present.      Mouth/Throat:      Pharynx: Oropharynx is clear. No oropharyngeal exudate or posterior oropharyngeal erythema.   Cardiovascular:      Rate and Rhythm: Normal rate and regular rhythm.      Heart sounds: Normal heart sounds.   Pulmonary:      Effort: No respiratory distress.      Breath sounds: Normal breath sounds. No wheezing or rales.   Lymphadenopathy:      Cervical: No cervical adenopathy.   Neurological:      Mental Status: She is alert and oriented to person, place, and time.   Psychiatric:         Mood and Affect: Mood normal.         Behavior: Behavior normal.              Assessment/Plan    Diagnoses and all orders for this visit:    1. Acute nasopharyngitis (Primary)  -     POCT SARS-CoV-2 + Flu Antigen MARIUM    2. Head congestion    3. Exposure to COVID-19 " virus  -     POCT SARS-CoV-2 + Flu Antigen MARIUM    Negative for flu and COVID.    Encouraged patient to treat symptoms with over-the-counter medications including nasal sprays like Flonase.  If worsening symptoms or any concerns-patient to call and let us know.            An After Visit Summary was printed and given to the patient at discharge.  Return if symptoms worsen or fail to improve.

## 2025-01-09 ENCOUNTER — OFFICE VISIT (OUTPATIENT)
Dept: FAMILY MEDICINE CLINIC | Facility: CLINIC | Age: 80
End: 2025-01-09
Payer: MEDICARE

## 2025-01-09 VITALS
HEIGHT: 62 IN | TEMPERATURE: 97.8 F | OXYGEN SATURATION: 97 % | HEART RATE: 68 BPM | BODY MASS INDEX: 31.91 KG/M2 | SYSTOLIC BLOOD PRESSURE: 141 MMHG | DIASTOLIC BLOOD PRESSURE: 82 MMHG | WEIGHT: 173.4 LBS

## 2025-01-09 DIAGNOSIS — J06.9 UPPER RESPIRATORY TRACT INFECTION, UNSPECIFIED TYPE: Primary | ICD-10-CM

## 2025-01-09 PROCEDURE — 1125F AMNT PAIN NOTED PAIN PRSNT: CPT | Performed by: NURSE PRACTITIONER

## 2025-01-09 PROCEDURE — 3077F SYST BP >= 140 MM HG: CPT | Performed by: NURSE PRACTITIONER

## 2025-01-09 PROCEDURE — 1159F MED LIST DOCD IN RCRD: CPT | Performed by: NURSE PRACTITIONER

## 2025-01-09 PROCEDURE — 3079F DIAST BP 80-89 MM HG: CPT | Performed by: NURSE PRACTITIONER

## 2025-01-09 PROCEDURE — 99213 OFFICE O/P EST LOW 20 MIN: CPT | Performed by: NURSE PRACTITIONER

## 2025-01-09 PROCEDURE — 1160F RVW MEDS BY RX/DR IN RCRD: CPT | Performed by: NURSE PRACTITIONER

## 2025-01-09 PROCEDURE — 87428 SARSCOV & INF VIR A&B AG IA: CPT | Performed by: NURSE PRACTITIONER

## 2025-01-09 PROCEDURE — 96372 THER/PROPH/DIAG INJ SC/IM: CPT | Performed by: NURSE PRACTITIONER

## 2025-01-09 RX ORDER — PREDNISONE 10 MG/1
TABLET ORAL
Qty: 21 TABLET | Refills: 0 | Status: SHIPPED | OUTPATIENT
Start: 2025-01-09

## 2025-01-09 RX ORDER — AZITHROMYCIN 250 MG/1
TABLET, FILM COATED ORAL
Qty: 6 TABLET | Refills: 0 | Status: SHIPPED | OUTPATIENT
Start: 2025-01-09

## 2025-01-09 RX ORDER — TRIAMCINOLONE ACETONIDE 40 MG/ML
40 INJECTION, SUSPENSION INTRA-ARTICULAR; INTRAMUSCULAR ONCE
Status: COMPLETED | OUTPATIENT
Start: 2025-01-09 | End: 2025-01-09

## 2025-01-09 RX ADMIN — TRIAMCINOLONE ACETONIDE 40 MG: 40 INJECTION, SUSPENSION INTRA-ARTICULAR; INTRAMUSCULAR at 15:12

## 2025-01-09 NOTE — PROGRESS NOTES
CC: URI    History:  Benedict Gomez is a 79 y.o. female who presents today for evaluation of the above problems.    URI   This is a new problem. The current episode started 1 to 4 weeks ago. The problem has been gradually worsening. Associated symptoms include headaches and a sore throat. Associated symptoms comments: Body aches, fatigue, dental pain. She has tried nothing for the symptoms. The treatment provided no relief.     ROS:  Review of Systems   HENT:  Positive for sore throat.    Neurological:  Positive for headaches.       Allergies   Allergen Reactions    Ace Inhibitors Cough    Propoxyphene Rash    Penicillins Hives     Past Medical History:   Diagnosis Date    Hypertension     Spinal stenosis of lumbar region      Past Surgical History:   Procedure Laterality Date    BACK SURGERY  07/05/2022    CHOLECYSTECTOMY      ECTOPIC PREGNANCY SURGERY      HYSTERECTOMY       Family History   Problem Relation Age of Onset    Cancer Mother     Heart disease Mother     No Known Problems Father     Colon cancer Neg Hx     Colon polyps Neg Hx     Esophageal cancer Neg Hx       reports that she has never smoked. She has been exposed to tobacco smoke. She has never used smokeless tobacco. She reports that she does not drink alcohol and does not use drugs.      Current Outpatient Medications:     atorvastatin (LIPITOR) 40 MG tablet, Take 1 tablet by mouth in the evening, Disp: 90 tablet, Rfl: 2    CALCIUM CITRATE PO, Take 600 mg by mouth Daily., Disp: , Rfl:     Cholecalciferol (Vitamin D3) 25 MCG (1000 UT) capsule, Take 1 capsule by mouth Daily., Disp: , Rfl:     clotrimazole-betamethasone (LOTRISONE) 1-0.05 % cream, Apply 1 Application topically to the appropriate area as directed 2 (Two) Times a Day., Disp: 15 g, Rfl: 5    DULoxetine (CYMBALTA) 20 MG capsule, Take 1 capsule by mouth Every Evening., Disp: , Rfl:     hydroxychloroquine (PLAQUENIL) 200 MG tablet, Take 1 tablet by mouth 2 (Two) Times a Day., Disp: ,  "Rfl:     losartan-hydrochlorothiazide (HYZAAR) 100-12.5 MG per tablet, Take 1 tablet by mouth Daily., Disp: 90 tablet, Rfl: 3    vitamin B-12 (CYANOCOBALAMIN) 1000 MCG tablet, Take 1 tablet by mouth Daily., Disp: 90 tablet, Rfl: 3    vitamin B-6 (PYRIDOXINE) 50 MG tablet, Take 1 tablet by mouth Daily., Disp: , Rfl:     azithromycin (Zithromax) 250 MG tablet, Take 2 tablets the first day, then 1 tablet daily for 4 days., Disp: 6 tablet, Rfl: 0    predniSONE (DELTASONE) 10 MG (21) dose pack, Use as directed on package, Disp: 21 tablet, Rfl: 0    Current Facility-Administered Medications:     triamcinolone acetonide (KENALOG-40) injection 40 mg, 40 mg, Intramuscular, Once, Lupis Bernstein, APRN    OBJECTIVE:  /82 (BP Location: Right arm, Patient Position: Sitting, Cuff Size: Adult)   Pulse 68   Temp 97.8 °F (36.6 °C) (Temporal)   Ht 157.5 cm (62\")   Wt 78.7 kg (173 lb 6.4 oz)   SpO2 97%   BMI 31.72 kg/m²    Physical Exam  Vitals reviewed.   Constitutional:       Appearance: She is well-developed.   HENT:      Right Ear: There is impacted cerumen.      Left Ear: There is impacted cerumen.      Nose: No rhinorrhea.      Mouth/Throat:      Pharynx: Posterior oropharyngeal erythema present.   Cardiovascular:      Rate and Rhythm: Normal rate and regular rhythm.      Heart sounds: Normal heart sounds.   Pulmonary:      Effort: Pulmonary effort is normal.      Breath sounds: Normal breath sounds.   Neurological:      Mental Status: She is alert and oriented to person, place, and time.   Psychiatric:         Behavior: Behavior normal.         Assessment/Plan    Diagnoses and all orders for this visit:    1. Upper respiratory tract infection, unspecified type (Primary)  -     POCT SARS-CoV-2 Antigen MARIUM + Flu  -     triamcinolone acetonide (KENALOG-40) injection 40 mg  -     azithromycin (Zithromax) 250 MG tablet; Take 2 tablets the first day, then 1 tablet daily for 4 days.  Dispense: 6 tablet; Refill: 0  -     " predniSONE (DELTASONE) 10 MG (21) dose pack; Use as directed on package  Dispense: 21 tablet; Refill: 0        An After Visit Summary was printed and given to the patient at discharge.  Return if symptoms worsen or fail to improve, for Next scheduled follow up.       JAC Mae 1/9/25    Electronically signed.

## 2025-01-24 ENCOUNTER — ANESTHESIA EVENT (OUTPATIENT)
Dept: GASTROENTEROLOGY | Facility: HOSPITAL | Age: 80
End: 2025-01-24
Payer: MEDICARE

## 2025-01-24 ENCOUNTER — HOSPITAL ENCOUNTER (OUTPATIENT)
Facility: HOSPITAL | Age: 80
Setting detail: HOSPITAL OUTPATIENT SURGERY
Discharge: HOME | End: 2025-01-24
Attending: INTERNAL MEDICINE | Admitting: INTERNAL MEDICINE
Payer: MEDICARE

## 2025-01-24 ENCOUNTER — ANESTHESIA (OUTPATIENT)
Dept: GASTROENTEROLOGY | Facility: HOSPITAL | Age: 80
End: 2025-01-24
Payer: MEDICARE

## 2025-01-24 VITALS
WEIGHT: 168 LBS | SYSTOLIC BLOOD PRESSURE: 152 MMHG | RESPIRATION RATE: 12 BRPM | HEART RATE: 62 BPM | DIASTOLIC BLOOD PRESSURE: 74 MMHG | HEIGHT: 63 IN | OXYGEN SATURATION: 100 % | BODY MASS INDEX: 29.77 KG/M2 | TEMPERATURE: 98.4 F

## 2025-01-24 PROCEDURE — 25010000002 PROPOFOL 1000 MG/100ML EMULSION: Performed by: NURSE ANESTHETIST, CERTIFIED REGISTERED

## 2025-01-24 PROCEDURE — 25010000002 LIDOCAINE PF 2% 2 % SOLUTION: Performed by: NURSE ANESTHETIST, CERTIFIED REGISTERED

## 2025-01-24 PROCEDURE — 25810000003 SODIUM CHLORIDE 0.9 % SOLUTION: Performed by: ANESTHESIOLOGY

## 2025-01-24 PROCEDURE — 45385 COLONOSCOPY W/LESION REMOVAL: CPT | Performed by: INTERNAL MEDICINE

## 2025-01-24 RX ORDER — LIDOCAINE HYDROCHLORIDE 20 MG/ML
INJECTION, SOLUTION EPIDURAL; INFILTRATION; INTRACAUDAL; PERINEURAL AS NEEDED
Status: DISCONTINUED | OUTPATIENT
Start: 2025-01-24 | End: 2025-01-24 | Stop reason: SURG

## 2025-01-24 RX ORDER — SODIUM CHLORIDE 9 MG/ML
500 INJECTION, SOLUTION INTRAVENOUS ONCE
Status: COMPLETED | OUTPATIENT
Start: 2025-01-24 | End: 2025-01-24

## 2025-01-24 RX ORDER — PROPOFOL 10 MG/ML
INJECTION, EMULSION INTRAVENOUS AS NEEDED
Status: DISCONTINUED | OUTPATIENT
Start: 2025-01-24 | End: 2025-01-24 | Stop reason: SURG

## 2025-01-24 RX ORDER — SODIUM CHLORIDE 0.9 % (FLUSH) 0.9 %
10 SYRINGE (ML) INJECTION AS NEEDED
Status: DISCONTINUED | OUTPATIENT
Start: 2025-01-24 | End: 2025-01-24 | Stop reason: HOSPADM

## 2025-01-24 RX ADMIN — PROPOFOL 50 MG: 10 INJECTION, EMULSION INTRAVENOUS at 10:38

## 2025-01-24 RX ADMIN — PROPOFOL 50 MG: 10 INJECTION, EMULSION INTRAVENOUS at 10:42

## 2025-01-24 RX ADMIN — SODIUM CHLORIDE 500 ML: 9 INJECTION, SOLUTION INTRAVENOUS at 08:51

## 2025-01-24 RX ADMIN — LIDOCAINE HYDROCHLORIDE 50 MG: 20 INJECTION, SOLUTION EPIDURAL; INFILTRATION; INTRACAUDAL; PERINEURAL at 10:38

## 2025-01-24 NOTE — ANESTHESIA POSTPROCEDURE EVALUATION
Patient: Benedict Gomez    Procedure Summary       Date: 01/24/25 Room / Location: [facility] ENDOSCOPY 5 / [facility] ENDOSCOPY    Anesthesia Start: 1035 Anesthesia Stop: 1049    Procedure: COLONOSCOPY WITH ANESTHESIA Diagnosis:       Positive colorectal cancer screening using DNA-based stool test      (Positive colorectal cancer screening using DNA-based stool test [R19.5])    Surgeons: Clyde Renee DO Provider: Benedict Atkins CRNA    Anesthesia Type: MAC ASA Status: 2            Anesthesia Type: MAC    Vitals  No vitals data found for the desired time range.          Post Anesthesia Care and Evaluation    Patient location during evaluation: PHASE II  Patient participation: complete - patient participated  Level of consciousness: awake  Pain score: 0  Pain management: adequate    Airway patency: patent  Anesthetic complications: No anesthetic complications  PONV Status: none  Cardiovascular status: acceptable  Respiratory status: acceptable  Hydration status: acceptable

## 2025-01-24 NOTE — H&P
[facility] Gastroenterology  Pre Procedure History & Physical    Chief Complaint:   Positive cologuard    Subjective     HPI:   Positive cologuard    Past Medical History:   Past Medical History:   Diagnosis Date    Hypertension     Spinal stenosis of lumbar region        Past Surgical History:  Past Surgical History:   Procedure Laterality Date    BACK SURGERY  07/05/2022    CHOLECYSTECTOMY      COLONOSCOPY      ECTOPIC PREGNANCY SURGERY      HYSTERECTOMY         Family History:  Family History   Problem Relation Age of Onset    Cancer Mother     Heart disease Mother     No Known Problems Father     Colon cancer Neg Hx     Colon polyps Neg Hx     Esophageal cancer Neg Hx        Social History:   reports that she has never smoked. She has been exposed to tobacco smoke. She has never used smokeless tobacco. She reports that she does not drink alcohol and does not use drugs.    Medications:   Prior to Admission medications    Medication Sig Start Date End Date Taking? Authorizing Provider   atorvastatin (LIPITOR) 40 MG tablet Take 1 tablet by mouth in the evening 12/3/24  Yes Nikita Sahu MD   CALCIUM CITRATE PO Take 600 mg by mouth Daily.   Yes Yeison Benjamin MD   Cholecalciferol (Vitamin D3) 25 MCG (1000 UT) capsule Take 1 capsule by mouth Daily.   Yes Yeison Benjamin MD   DULoxetine (CYMBALTA) 20 MG capsule Take 1 capsule by mouth Every Evening. 9/4/24  Yes Yeison Benjamin MD   hydroxychloroquine (PLAQUENIL) 200 MG tablet Take 1 tablet by mouth 2 (Two) Times a Day. 2/8/22  Yes Yieson Benjamin MD   losartan-hydrochlorothiazide (HYZAAR) 100-12.5 MG per tablet Take 1 tablet by mouth Daily. 3/20/24  Yes Nikita Sahu MD   vitamin B-12 (CYANOCOBALAMIN) 1000 MCG tablet Take 1 tablet by mouth Daily. 9/9/21  Yes Nikita Sahu MD   vitamin B-6 (PYRIDOXINE) 50 MG tablet Take 1 tablet by mouth Daily.   Yes Yeison Benjamin MD   azithromycin (Zithromax) 250 MG  "tablet Take 2 tablets the first day, then 1 tablet daily for 4 days. 1/9/25   Lupis Bernstein APRN   clotrimazole-betamethasone (LOTRISONE) 1-0.05 % cream Apply 1 Application topically to the appropriate area as directed 2 (Two) Times a Day. 8/22/24   Nikita Sahu MD   predniSONE (DELTASONE) 10 MG (21) dose pack Use as directed on package 1/9/25   Lupis Bernstein APRN       Allergies:  Ace inhibitors, Propoxyphene, and Penicillins    ROS:    General: Weight stable  Resp: No SOA  Cardiovascular: No CP    Objective     Blood pressure 173/72, pulse 62, temperature 98.4 °F (36.9 °C), temperature source Tympanic, resp. rate 18, height 160 cm (63\"), weight 76.2 kg (168 lb), SpO2 100%.    Physical Exam   Constitutional: Pt is oriented to person, place, and in no distress.   HENT: Mouth/Throat: Oropharynx is clear.   Cardiovascular: Normal rate, regular rhythm.    Pulmonary/Chest: Effort normal. No respiratory distress. No  wheezes.   Abdominal: Soft. Non-distended.  Skin: Skin is warm and dry.   Psychiatric: Mood, memory, affect and judgment appear normal.     Assessment & Plan     Diagnosis:  Positive Cologuard    Anticipated Surgical Procedure:  C-scope    The risks, benefits, and alternatives of this procedure have been discussed with the patient or the responsible party- the patient understands and agrees to proceed.        "

## 2025-01-24 NOTE — ANESTHESIA PREPROCEDURE EVALUATION
Anesthesia Evaluation     no history of anesthetic complications:   NPO Solid Status: > 8 hours  NPO Liquid Status: > 2 hours           Airway   Mallampati: I  No difficulty expected  Dental      Pulmonary    Cardiovascular   Exercise tolerance: good (4-7 METS)    (+) hypertension, hyperlipidemia  (-) CAD      Neuro/Psych  GI/Hepatic/Renal/Endo      Musculoskeletal     Abdominal    Substance History      OB/GYN          Other                      Anesthesia Plan    ASA 2     MAC     intravenous induction     Anesthetic plan, risks, benefits, and alternatives have been provided, discussed and informed consent has been obtained with: patient.    CODE STATUS:

## 2025-02-10 ENCOUNTER — OFFICE VISIT (OUTPATIENT)
Dept: FAMILY MEDICINE CLINIC | Facility: CLINIC | Age: 80
End: 2025-02-10
Payer: MEDICARE

## 2025-02-10 VITALS
OXYGEN SATURATION: 97 % | SYSTOLIC BLOOD PRESSURE: 122 MMHG | WEIGHT: 172 LBS | HEIGHT: 63 IN | HEART RATE: 74 BPM | TEMPERATURE: 97.1 F | DIASTOLIC BLOOD PRESSURE: 70 MMHG | BODY MASS INDEX: 30.48 KG/M2 | RESPIRATION RATE: 18 BRPM

## 2025-02-10 DIAGNOSIS — M25.552 ACUTE HIP PAIN, LEFT: ICD-10-CM

## 2025-02-10 DIAGNOSIS — M25.562 ACUTE PAIN OF LEFT KNEE: Primary | ICD-10-CM

## 2025-02-10 PROCEDURE — 99213 OFFICE O/P EST LOW 20 MIN: CPT | Performed by: NURSE PRACTITIONER

## 2025-02-10 PROCEDURE — 3074F SYST BP LT 130 MM HG: CPT | Performed by: NURSE PRACTITIONER

## 2025-02-10 PROCEDURE — 1125F AMNT PAIN NOTED PAIN PRSNT: CPT | Performed by: NURSE PRACTITIONER

## 2025-02-10 PROCEDURE — 3078F DIAST BP <80 MM HG: CPT | Performed by: NURSE PRACTITIONER

## 2025-02-10 NOTE — PROGRESS NOTES
CC:   Chief Complaint   Patient presents with    Leg Pain     X3 weeks. Starts in the Left hip and goes down to the knee.        History:  Benedict Gomez is a 79 y.o. female who presents today for evaluation of the above problems.      HPI    Patient presents with c/o left hip and knee pain. Ongoing about 3 weeks. No known injury. Report she has had low back surgery about 3-4 years ago, but pain more to hip joint/groin and down to knee. Reports current pain level is 4/10 but in the evenings pain worsens to 9/10. Pain worse with walking, going up stairs, bending over and laying down. Reports her left knee swells in the evenings. Patient has arthritis and goes to Dr. Chong, rheumatology.   Patient states the pain continues to worsen and hip feels unstable at times.     Allergies   Allergen Reactions    Ace Inhibitors Cough    Propoxyphene Rash    Penicillins Hives     Past Medical History:   Diagnosis Date    Hypertension     Spinal stenosis of lumbar region      Past Surgical History:   Procedure Laterality Date    BACK SURGERY  07/05/2022    CHOLECYSTECTOMY      COLONOSCOPY      COLONOSCOPY N/A 1/24/2025    Procedure: COLONOSCOPY WITH ANESTHESIA;  Surgeon: Clyde Renee DO;  Location: [facility] ENDOSCOPY;  Service: Gastroenterology;  Laterality: N/A;  pre op: Positive colorectal cancer screening using DNA-based stool test  post op: diverticulosis, polyp  pcp: Nikita Sahu MD    ECTOPIC PREGNANCY SURGERY      HYSTERECTOMY       Family History   Problem Relation Age of Onset    Cancer Mother     Heart disease Mother     No Known Problems Father     Colon cancer Neg Hx     Colon polyps Neg Hx     Esophageal cancer Neg Hx       reports that she has never smoked. She has been exposed to tobacco smoke. She has never used smokeless tobacco. She reports that she does not drink alcohol and does not use drugs.      Current Outpatient Medications:     atorvastatin (LIPITOR) 40 MG tablet, Take 1 tablet by  "mouth in the evening, Disp: 90 tablet, Rfl: 2    CALCIUM CITRATE PO, Take 600 mg by mouth Daily., Disp: , Rfl:     Cholecalciferol (Vitamin D3) 25 MCG (1000 UT) capsule, Take 1 capsule by mouth Daily., Disp: , Rfl:     clotrimazole-betamethasone (LOTRISONE) 1-0.05 % cream, Apply 1 Application topically to the appropriate area as directed 2 (Two) Times a Day., Disp: 15 g, Rfl: 5    DULoxetine (CYMBALTA) 20 MG capsule, Take 1 capsule by mouth Every Evening., Disp: , Rfl:     hydroxychloroquine (PLAQUENIL) 200 MG tablet, Take 1 tablet by mouth 2 (Two) Times a Day., Disp: , Rfl:     losartan-hydrochlorothiazide (HYZAAR) 100-12.5 MG per tablet, Take 1 tablet by mouth Daily., Disp: 90 tablet, Rfl: 3    vitamin B-12 (CYANOCOBALAMIN) 1000 MCG tablet, Take 1 tablet by mouth Daily., Disp: 90 tablet, Rfl: 3    vitamin B-6 (PYRIDOXINE) 50 MG tablet, Take 1 tablet by mouth Daily., Disp: , Rfl:     diclofenac (VOLTAREN) 50 MG EC tablet, Take 1 tablet by mouth 2 (Two) Times a Day As Needed (left knee and hip pain) for up to 30 days., Disp: 60 tablet, Rfl: 0    OBJECTIVE:  /70 (BP Location: Left arm, Patient Position: Sitting, Cuff Size: Adult)   Pulse 74   Temp 97.1 °F (36.2 °C) (Temporal)   Resp 18   Ht 160 cm (63\")   Wt 78 kg (172 lb)   SpO2 97%   BMI 30.47 kg/m²    Estimated body mass index is 30.47 kg/m² as calculated from the following:    Height as of this encounter: 160 cm (63\").    Weight as of this encounter: 78 kg (172 lb).                  Physical Exam  Vitals reviewed.   Constitutional:       General: She is not in acute distress.     Appearance: Normal appearance.   HENT:      Head: Normocephalic and atraumatic.      Left Ear: External ear normal.   Cardiovascular:      Rate and Rhythm: Normal rate and regular rhythm.      Heart sounds: Normal heart sounds.   Pulmonary:      Effort: No respiratory distress.      Breath sounds: Normal breath sounds. No wheezing.   Musculoskeletal:         General: Normal " range of motion.      Left hip: Tenderness present.      Left knee: Normal range of motion. Tenderness present.   Skin:     General: Skin is warm and dry.   Neurological:      Mental Status: She is alert and oriented to person, place, and time.   Psychiatric:         Mood and Affect: Mood normal.         Behavior: Behavior normal.              Assessment/Plan    Diagnoses and all orders for this visit:    1. Acute pain of left knee (Primary)  -     XR Knee 3 View Left; Future    2. Acute hip pain, left  -     XR Hip With or Without Pelvis 2 - 3 View Left; Future    Other orders  -     diclofenac (VOLTAREN) 50 MG EC tablet; Take 1 tablet by mouth 2 (Two) Times a Day As Needed (left knee and hip pain) for up to 30 days.  Dispense: 60 tablet; Refill: 0    Xray to evaluate further. Patient is taking OTC Aleve and tylenol occasionally for pain with little relief. Will send in prescription NSAID to help with inflammation and pain. Medication SE discussed. Patient has had steroid injection and oral steroid about 6 weeks ago for URI. Encouraged Ice. Will call with imaging results and f/u from there            An After Visit Summary was printed and given to the patient at discharge.  No follow-ups on file.

## 2025-02-12 RX ORDER — TRAMADOL HYDROCHLORIDE 50 MG/1
50 TABLET ORAL EVERY 8 HOURS PRN
Qty: 9 TABLET | Refills: 0 | Status: SHIPPED | OUTPATIENT
Start: 2025-02-12 | End: 2025-02-15

## 2025-03-05 DIAGNOSIS — I10 PRIMARY HYPERTENSION: Primary | ICD-10-CM

## 2025-03-05 RX ORDER — LOSARTAN POTASSIUM AND HYDROCHLOROTHIAZIDE 12.5; 1 MG/1; MG/1
1 TABLET ORAL DAILY
Qty: 90 TABLET | Refills: 1 | Status: SHIPPED | OUTPATIENT
Start: 2025-03-05

## 2025-03-05 NOTE — TELEPHONE ENCOUNTER
Rx Refill Note  Requested Prescriptions     Pending Prescriptions Disp Refills    losartan-hydrochlorothiazide (HYZAAR) 100-12.5 MG per tablet [Pharmacy Med Name: Losartan Potassium-HCTZ 100-12.5 MG Oral Tablet] 90 tablet 0     Sig: Take 1 tablet by mouth once daily      Last office visit with prescribing clinician: 9/18/2024   Last telemedicine visit with prescribing clinician: Visit date not found   Next office visit with prescribing clinician: 3/20/2025       {TIP  Please add Last Relevant Lab 9/18/24                 Would you like a call back once the refill request has been completed: [] Yes [] No    If the office needs to give you a call back, can they leave a voicemail: [] Yes [] No    Wanda Clifford MA  03/05/25, 07:13 CST

## 2025-03-13 NOTE — TELEPHONE ENCOUNTER
Rx Refill Note  Requested Prescriptions     Pending Prescriptions Disp Refills    diclofenac (VOLTAREN) 50 MG EC tablet [Pharmacy Med Name: Diclofenac Sodium 50 MG Oral Tablet Delayed Release] 60 tablet 0     Sig: TAKE 1 TABLET BY MOUTH TWICE DAILY AS NEEDED FOR  LEFT  KNEE  AND  HIP  PAIN      Last office visit with prescribing clinician: 2/10/2025   Last telemedicine visit with prescribing clinician: Visit date not found   Next office visit with prescribing clinician: Visit date not found                         Would you like a call back once the refill request has been completed: [] Yes [] No    If the office needs to give you a call back, can they leave a voicemail: [] Yes [] No    Wanda Decker MA  03/13/25, 14:44 CDT

## 2025-03-20 ENCOUNTER — OFFICE VISIT (OUTPATIENT)
Dept: FAMILY MEDICINE CLINIC | Facility: CLINIC | Age: 80
End: 2025-03-20
Payer: MEDICARE

## 2025-03-20 VITALS
BODY MASS INDEX: 31.01 KG/M2 | SYSTOLIC BLOOD PRESSURE: 130 MMHG | TEMPERATURE: 97.4 F | RESPIRATION RATE: 18 BRPM | DIASTOLIC BLOOD PRESSURE: 70 MMHG | WEIGHT: 175 LBS | HEART RATE: 66 BPM | HEIGHT: 63 IN | OXYGEN SATURATION: 96 %

## 2025-03-20 DIAGNOSIS — E78.2 MIXED HYPERLIPIDEMIA: Primary | ICD-10-CM

## 2025-03-20 NOTE — PROGRESS NOTES
Subjective   Benedict Gomez is a 79 y.o. female.     History of Present Illness  79-year-old female follow-up hyperlipidemia also chronic back pain      The following portions of the patient's history were reviewed and updated as appropriate: allergies, current medications, past family history, past medical history, past social history, past surgical history, and problem list.    Review of Systems   Respiratory:  Negative for shortness of breath.    Cardiovascular:  Negative for chest pain and leg swelling.   Musculoskeletal:  Positive for arthralgias and back pain.       Objective   Physical Exam  Vitals and nursing note reviewed.   Constitutional:       Appearance: Normal appearance.   Eyes:      Extraocular Movements: Extraocular movements intact.      Pupils: Pupils are equal, round, and reactive to light.   Cardiovascular:      Rate and Rhythm: Normal rate and regular rhythm.   Pulmonary:      Effort: Pulmonary effort is normal.      Breath sounds: Normal breath sounds.   Abdominal:      General: Abdomen is flat.      Palpations: Abdomen is soft.   Musculoskeletal:      Right lower leg: No edema.      Left lower leg: No edema.   Neurological:      General: No focal deficit present.      Mental Status: She is alert and oriented to person, place, and time.   Psychiatric:         Mood and Affect: Mood normal.         Behavior: Behavior normal.         Thought Content: Thought content normal.         Judgment: Judgment normal.             Assessment & Plan   Diagnoses and all orders for this visit:    1. Mixed hyperlipidemia (Primary)  -     Comprehensive Metabolic Panel  -     Lipid Panel       #2 chronic back pain-addressed exercises that she learned in physical therapy    Plan above

## 2025-03-21 LAB
ALBUMIN SERPL-MCNC: 4.2 G/DL (ref 3.5–5.2)
ALBUMIN/GLOB SERPL: 1.8 G/DL
ALP SERPL-CCNC: 58 U/L (ref 39–117)
ALT SERPL-CCNC: 29 U/L (ref 1–33)
AST SERPL-CCNC: 30 U/L (ref 1–32)
BILIRUB SERPL-MCNC: 0.7 MG/DL (ref 0–1.2)
BUN SERPL-MCNC: 26 MG/DL (ref 8–23)
BUN/CREAT SERPL: 23.4 (ref 7–25)
CALCIUM SERPL-MCNC: 9.7 MG/DL (ref 8.6–10.5)
CHLORIDE SERPL-SCNC: 106 MMOL/L (ref 98–107)
CHOLEST SERPL-MCNC: 150 MG/DL (ref 0–200)
CO2 SERPL-SCNC: 27.6 MMOL/L (ref 22–29)
CREAT SERPL-MCNC: 1.11 MG/DL (ref 0.57–1)
EGFRCR SERPLBLD CKD-EPI 2021: 50.7 ML/MIN/1.73
GLOBULIN SER CALC-MCNC: 2.4 GM/DL
GLUCOSE SERPL-MCNC: 85 MG/DL (ref 65–99)
HDLC SERPL-MCNC: 70 MG/DL (ref 40–60)
LDLC SERPL CALC-MCNC: 65 MG/DL (ref 0–100)
POTASSIUM SERPL-SCNC: 4.4 MMOL/L (ref 3.5–5.2)
PROT SERPL-MCNC: 6.6 G/DL (ref 6–8.5)
SODIUM SERPL-SCNC: 143 MMOL/L (ref 136–145)
TRIGL SERPL-MCNC: 77 MG/DL (ref 0–150)
VLDLC SERPL CALC-MCNC: 15 MG/DL (ref 5–40)

## 2025-05-09 ENCOUNTER — OFFICE VISIT (OUTPATIENT)
Dept: FAMILY MEDICINE CLINIC | Facility: CLINIC | Age: 80
End: 2025-05-09
Payer: MEDICARE

## 2025-05-09 VITALS
RESPIRATION RATE: 18 BRPM | OXYGEN SATURATION: 98 % | TEMPERATURE: 98.4 F | WEIGHT: 175 LBS | SYSTOLIC BLOOD PRESSURE: 124 MMHG | HEIGHT: 63 IN | HEART RATE: 75 BPM | BODY MASS INDEX: 31.01 KG/M2 | DIASTOLIC BLOOD PRESSURE: 68 MMHG

## 2025-05-09 DIAGNOSIS — H61.21 IMPACTED CERUMEN OF RIGHT EAR: Primary | ICD-10-CM

## 2025-05-09 NOTE — PROGRESS NOTES
CC:   Chief Complaint   Patient presents with    Cerumen Impaction     bilateral        History:  Benedict Gomez is a 79 y.o. female who presents today for evaluation of the above problems.      HPI  Patient presents with complaint of wax buildup.  Reports she has been using over-the-counter wax softening drops.  Has noticed a change in her hearing.  No other concerns today.    Allergies   Allergen Reactions    Ace Inhibitors Cough    Propoxyphene Rash    Penicillins Hives     Past Medical History:   Diagnosis Date    Hypertension     Spinal stenosis of lumbar region      Past Surgical History:   Procedure Laterality Date    BACK SURGERY  07/05/2022    CHOLECYSTECTOMY      COLONOSCOPY      COLONOSCOPY N/A 1/24/2025    Procedure: COLONOSCOPY WITH ANESTHESIA;  Surgeon: Clyde Renee DO;  Location: [facility] ENDOSCOPY;  Service: Gastroenterology;  Laterality: N/A;  pre op: Positive colorectal cancer screening using DNA-based stool test  post op: diverticulosis, polyp  pcp: Nikita Sahu MD    ECTOPIC PREGNANCY SURGERY      HYSTERECTOMY       Family History   Problem Relation Age of Onset    Cancer Mother     Heart disease Mother     No Known Problems Father     Colon cancer Neg Hx     Colon polyps Neg Hx     Esophageal cancer Neg Hx       reports that she has never smoked. She has been exposed to tobacco smoke. She has never used smokeless tobacco. She reports that she does not drink alcohol and does not use drugs.      Current Outpatient Medications:     atorvastatin (LIPITOR) 40 MG tablet, Take 1 tablet by mouth in the evening, Disp: 90 tablet, Rfl: 2    CALCIUM CITRATE PO, Take 600 mg by mouth Daily., Disp: , Rfl:     Cholecalciferol (Vitamin D3) 25 MCG (1000 UT) capsule, Take 1 capsule by mouth Daily., Disp: , Rfl:     clotrimazole-betamethasone (LOTRISONE) 1-0.05 % cream, Apply 1 Application topically to the appropriate area as directed 2 (Two) Times a Day., Disp: 15 g, Rfl: 5    diclofenac  "(VOLTAREN) 50 MG EC tablet, TAKE 1 TABLET BY MOUTH TWICE DAILY AS NEEDED FOR  LEFT  KNEE  AND  HIP  PAIN, Disp: 60 tablet, Rfl: 0    DULoxetine (CYMBALTA) 20 MG capsule, Take 1 capsule by mouth Every Evening., Disp: , Rfl:     losartan-hydrochlorothiazide (HYZAAR) 100-12.5 MG per tablet, Take 1 tablet by mouth once daily, Disp: 90 tablet, Rfl: 1    vitamin B-12 (CYANOCOBALAMIN) 1000 MCG tablet, Take 1 tablet by mouth Daily., Disp: 90 tablet, Rfl: 3    vitamin B-6 (PYRIDOXINE) 50 MG tablet, Take 1 tablet by mouth Daily., Disp: , Rfl:     OBJECTIVE:  /68 (BP Location: Left arm, Patient Position: Sitting, Cuff Size: Adult)   Pulse 75   Temp 98.4 °F (36.9 °C) (Temporal)   Resp 18   Ht 160 cm (63\")   Wt 79.4 kg (175 lb)   SpO2 98%   BMI 31.00 kg/m²    Estimated body mass index is 31 kg/m² as calculated from the following:    Height as of this encounter: 160 cm (63\").    Weight as of this encounter: 79.4 kg (175 lb).                  Physical Exam  Vitals reviewed.   Constitutional:       General: She is not in acute distress.     Appearance: Normal appearance.   HENT:      Head: Normocephalic and atraumatic.      Right Ear: External ear normal. There is impacted cerumen.      Left Ear: Tympanic membrane, ear canal and external ear normal.   Cardiovascular:      Rate and Rhythm: Normal rate and regular rhythm.      Heart sounds: Normal heart sounds.   Pulmonary:      Effort: No respiratory distress.      Breath sounds: Normal breath sounds. No wheezing.   Neurological:      Mental Status: She is alert and oriented to person, place, and time.   Psychiatric:         Mood and Affect: Mood normal.         Behavior: Behavior normal.              Assessment/Plan    Diagnoses and all orders for this visit:    1. Impacted cerumen of right ear (Primary)  -     Ear Cerumen Removal      Cerumen easily removed with irrigation. Tolerated well. Ear drum normal.           An After Visit Summary was printed and given to the " patient at discharge.  Return if symptoms worsen or fail to improve.

## 2025-05-09 NOTE — PROGRESS NOTES
Procedure   Ear Cerumen Removal    Date/Time: 5/9/2025 11:07 AM    Performed by: Evonne Berg APRN  Authorized by: Evonne Berg APRN    Anesthesia:  Local Anesthetic: none  Location details: right ear  Patient tolerance: patient tolerated the procedure well with no immediate complications  Comments: TM normal  Procedure type: irrigation   Sedation:  Patient sedated: no

## 2025-05-27 ENCOUNTER — OFFICE VISIT (OUTPATIENT)
Dept: FAMILY MEDICINE CLINIC | Facility: CLINIC | Age: 80
End: 2025-05-27
Payer: MEDICARE

## 2025-05-27 VITALS
OXYGEN SATURATION: 98 % | WEIGHT: 175 LBS | TEMPERATURE: 98.5 F | HEART RATE: 78 BPM | DIASTOLIC BLOOD PRESSURE: 70 MMHG | RESPIRATION RATE: 18 BRPM | SYSTOLIC BLOOD PRESSURE: 130 MMHG | BODY MASS INDEX: 31.01 KG/M2 | HEIGHT: 63 IN

## 2025-05-27 DIAGNOSIS — J01.00 ACUTE NON-RECURRENT MAXILLARY SINUSITIS: Primary | ICD-10-CM

## 2025-05-27 PROCEDURE — 96372 THER/PROPH/DIAG INJ SC/IM: CPT | Performed by: FAMILY MEDICINE

## 2025-05-27 PROCEDURE — 1126F AMNT PAIN NOTED NONE PRSNT: CPT | Performed by: FAMILY MEDICINE

## 2025-05-27 PROCEDURE — 1159F MED LIST DOCD IN RCRD: CPT | Performed by: FAMILY MEDICINE

## 2025-05-27 PROCEDURE — 3075F SYST BP GE 130 - 139MM HG: CPT | Performed by: FAMILY MEDICINE

## 2025-05-27 PROCEDURE — 1160F RVW MEDS BY RX/DR IN RCRD: CPT | Performed by: FAMILY MEDICINE

## 2025-05-27 PROCEDURE — 3078F DIAST BP <80 MM HG: CPT | Performed by: FAMILY MEDICINE

## 2025-05-27 PROCEDURE — 99213 OFFICE O/P EST LOW 20 MIN: CPT | Performed by: FAMILY MEDICINE

## 2025-05-27 RX ORDER — PREDNISONE 20 MG/1
TABLET ORAL
Qty: 15 TABLET | Refills: 0 | Status: SHIPPED | OUTPATIENT
Start: 2025-05-27

## 2025-05-27 RX ORDER — AZITHROMYCIN 250 MG/1
TABLET, FILM COATED ORAL
Qty: 6 TABLET | Refills: 0 | Status: SHIPPED | OUTPATIENT
Start: 2025-05-27

## 2025-05-27 RX ORDER — TRIAMCINOLONE ACETONIDE 40 MG/ML
40 INJECTION, SUSPENSION INTRA-ARTICULAR; INTRAMUSCULAR ONCE
Status: COMPLETED | OUTPATIENT
Start: 2025-05-27 | End: 2025-05-27

## 2025-05-27 RX ADMIN — TRIAMCINOLONE ACETONIDE 40 MG: 40 INJECTION, SUSPENSION INTRA-ARTICULAR; INTRAMUSCULAR at 13:37

## 2025-05-27 NOTE — PROGRESS NOTES
Subjective   Benedict Gomez is a 79 y.o. female.     History of Present Illness  79-year-old female with sinusitis and insect bites      The following portions of the patient's history were reviewed and updated as appropriate: allergies, current medications, past family history, past medical history, past social history, past surgical history, and problem list.    Review of Systems   HENT:  Positive for postnasal drip and sinus pressure.    Respiratory:  Negative for shortness of breath.    Cardiovascular:  Negative for chest pain and leg swelling.   Skin:         Small bites extremities consistent with insect       Objective   Physical Exam  Vitals and nursing note reviewed.   Constitutional:       Appearance: Normal appearance.   HENT:      Right Ear: Tympanic membrane normal.      Left Ear: Tympanic membrane normal.      Mouth/Throat:      Mouth: Mucous membranes are moist.   Eyes:      Extraocular Movements: Extraocular movements intact.      Pupils: Pupils are equal, round, and reactive to light.   Cardiovascular:      Rate and Rhythm: Normal rate and regular rhythm.   Pulmonary:      Effort: Pulmonary effort is normal.      Breath sounds: Normal breath sounds. No wheezing or rhonchi.   Musculoskeletal:      Right lower leg: No edema.      Left lower leg: No edema.   Neurological:      General: No focal deficit present.      Mental Status: She is alert and oriented to person, place, and time.   Psychiatric:         Mood and Affect: Mood normal.         Behavior: Behavior normal.         Thought Content: Thought content normal.         Judgment: Judgment normal.         Assessment & Plan   Diagnoses and all orders for this visit:    1. Acute non-recurrent maxillary sinusitis (Primary)  -     triamcinolone acetonide (KENALOG-40) injection 40 mg    Other orders  -     azithromycin (Zithromax Z-Stiven) 250 MG tablet; Take 2 tablets the first day, then 1 tablet daily for 4 days.-Start today  Dispense: 6 tablet;  Refill: 0  -     predniSONE (DELTASONE) 20 MG tablet; Starting Wednesday morning take 2 pills on Wednesday Thursday Friday morning then 1 pill to medicine gone  Dispense: 15 tablet; Refill: 0           Plan above plus cetirizine 10 mg

## (undated) DEVICE — SNAR POLYP CAPTIVATOR MICROHEX 13 240CM

## (undated) DEVICE — MASK,OXYGEN,MED CONC,ADLT,7' TUB, UC: Brand: PENDING

## (undated) DEVICE — ARGYLE YANKAUER BULB TIP WITH VENT: Brand: ARGYLE

## (undated) DEVICE — Device: Brand: DEFENDO AIR/WATER/SUCTION AND BIOPSY VALVE

## (undated) DEVICE — SENSR O2 OXIMAX FNGR A/ 18IN NONSTR

## (undated) DEVICE — THE CHANNEL CLEANING BRUSH IS A NYLON FLEXI BRUSH ATTACHED TO A FLEXIBLE PLASTIC SHEATH DESIGNED TO SAFELY REMOVE DEBRIS FROM FLEXIBLE ENDOSCOPES.

## (undated) DEVICE — THE SINGLE USE ETRAP – POLYP TRAP IS USED FOR SUCTION RETRIEVAL OF ENDOSCOPICALLY REMOVED POLYPS.: Brand: ETRAP